# Patient Record
Sex: FEMALE | Race: WHITE | NOT HISPANIC OR LATINO | Employment: FULL TIME | URBAN - METROPOLITAN AREA
[De-identification: names, ages, dates, MRNs, and addresses within clinical notes are randomized per-mention and may not be internally consistent; named-entity substitution may affect disease eponyms.]

---

## 2017-01-19 ENCOUNTER — OFFICE VISIT (OUTPATIENT)
Dept: ORTHOPEDIC SURGERY | Facility: CLINIC | Age: 57
End: 2017-01-19

## 2017-01-19 VITALS — BODY MASS INDEX: 23.95 KG/M2 | HEIGHT: 68 IN | WEIGHT: 158 LBS

## 2017-01-19 DIAGNOSIS — M25.552 HIP PAIN, LEFT: Primary | ICD-10-CM

## 2017-01-19 PROCEDURE — 99213 OFFICE O/P EST LOW 20 MIN: CPT | Performed by: ORTHOPAEDIC SURGERY

## 2017-01-19 PROCEDURE — 73502 X-RAY EXAM HIP UNI 2-3 VIEWS: CPT | Performed by: ORTHOPAEDIC SURGERY

## 2017-01-19 RX ORDER — TOPIRAMATE 50 MG/1
50 TABLET, FILM COATED ORAL NIGHTLY
COMMUNITY
Start: 2016-02-15 | End: 2018-11-28

## 2017-01-19 NOTE — PROGRESS NOTES
Patient: Keila Caldwell  YOB: 1960 57 y.o. female  Medical Record Number: [id number]    Chief Complaints:   Chief Complaint   Patient presents with   • Left Hip - Establish Care   • Left Knee - Follow-up       History of Present Illness:Keila Caldwell is a 57 y.o. female who presents with  complaints of mild soreness on the posterior aspect of the left hip.  She initially had some knee complaints when she started seeing me that has resolved and now feels most of the discomfort is on the posterior aspect of the hip described as an occasional ache.  Worse when sitting.    Allergies:   Allergies   Allergen Reactions   • Naproxen Other (See Comments)     Kidney dysfunction       Medications:   Current Outpatient Prescriptions   Medication Sig Dispense Refill   • calcitriol (ROCALTROL) 0.5 MCG capsule Take 0.5 mcg by mouth.     • hydroxychloroquine (PLAQUENIL) 200 MG tablet TAKE 1 TABLET TWICE A DAY  3   • rizatriptan (MAXALT) 10 MG tablet TAKE 1 TABLET BY MOUTH ONCE AS NEEDED FOR MIGRAINE FOR UP TO 1 DOSE MAY REPEAT IN 2 HOURS IF NEEDED  5   • SYNTHROID 100 MCG tablet TAKE 1 TABLET BY MOUTH DAILY  3   • topiramate (TOPAMAX) 50 MG tablet TAKE 1 TABLET BY MOUTH NIGHTLY  5   • topiramate (TOPAMAX) 50 MG tablet Take 50 mg by mouth.     • vitamin D (ERGOCALCIFEROL) 76139 UNITS capsule capsule Take 50,000 Units by mouth.     • Diclofenac Sodium (PENNSAID) 1.5 % solution Place  on the skin.     • ESTROGEL 0.75 MG/1.25 GM (0.06%) topical gel Place 1.25 g on the skin daily.       No current facility-administered medications for this visit.          The following portions of the patient's history were reviewed and updated as appropriate: allergies, current medications, past family history, past medical history, past social history, past surgical history and problem list.    Review of Systems:   A 14 point review of systems was performed. All systems negative except pertinent positives/negative listed in HPI  "above    Physical Exam:   Vitals:    01/19/17 0818   Weight: 158 lb (71.7 kg)   Height: 68\" (172.7 cm)       General: A and O x 3, ASA, NAD    SCLERA:    Normal    DENTITION:   Normal  Hip:  left    LEG ALIGNMENT:     Neutral   ,    equal leg lengths    GAIT:     Nonantalgic    SKIN:     No abnormality    RANGE OF MOTION:      Full without joint irritability    STRENGTH:     5 / 5    hip flexion and abduction    DISTAL PULSES:    Paplable    DISTAL SENSATION :   Intact    LYMPHATICS:     No   lymphadenopathy    OTHER:          - Negative Stinchfeld test      - Negative log roll      - No Tenderness to palpation trochanteric bursa      - Neg FADIR      - Neg BEBETO      - No SI tenderness          Radiology:  Xrays 2views (AP bilateral hips, and lateral of the hip) ordered and reviewed for evaluation of hip pain  demonstrating  no abnormality. There are no previous films for comparision.    Assessment/Plan: Left posterior hip pain likely muscular in nature.  We'll send her to physical therapy.  I'll see her back on a when necessary basis.      Karl Schaefer MD  1/19/2017  "

## 2017-01-19 NOTE — MR AVS SNAPSHOT
Keila Caldwell   1/19/2017 8:15 AM   Office Visit    Dept Phone:  [phone]   Encounter #:  [id number]    Provider:  Karl Schaefer MD   Department:  [facility] MEDICAL [facility]                Your Full Care Plan              Your Updated Medication List          This list is accurate as of: 1/19/17  8:38 AM.  Always use your most recent med list.                calcitriol 0.5 MCG capsule   Commonly known as:  ROCALTROL       ESTROGEL 0.75 MG/1.25 GM (0.06%) topical gel   Generic drug:  Estradiol       hydroxychloroquine 200 MG tablet   Commonly known as:  PLAQUENIL       PENNSAID 1.5 % solution   Generic drug:  Diclofenac Sodium       rizatriptan 10 MG tablet   Commonly known as:  MAXALT       SYNTHROID 100 MCG tablet   Generic drug:  levothyroxine       * topiramate 50 MG tablet   Commonly known as:  TOPAMAX       * topiramate 50 MG tablet   Commonly known as:  TOPAMAX       vitamin D 81593 UNITS capsule capsule   Commonly known as:  ERGOCALCIFEROL       * Notice:  This list has 2 medication(s) that are the same as other medications prescribed for you. Read the directions carefully, and ask your doctor or other care provider to review them with you.            We Performed the Following     Ambulatory Referral to Physical Therapy Evaluate and treat     XR Hip With or Without Pelvis 2 - 3 View Left       You Were Diagnosed With        Codes Comments    Hip pain, left    -  Primary ICD-10-CM: M25.552  ICD-9-CM: 719.45       Instructions     None    Patient Instructions History      Upcoming Appointments     Visit Type Date Time Department    OFFICE VISIT 1/19/2017  8:15 AM [facility]      [employer] Signup     Our records indicate that you have an active [employer] account.    You can view your After Visit Summary by going to [employer] and logging in with your [employer] username and password.  If you don't have a [employer]  "username and password but a parent or guardian has access to your record, the parent or guardian should login with their own [employer] username and password and access your record to view the After Visit Summary.    If you have questions, you can email [email or web address] or call [phone] to talk to our [employer] staff.  Remember, [employer] is NOT to be used for urgent needs.  For medical emergencies, dial 911.               Other Info from Your Visit           Allergies     Naproxen Allergy Other (See Comments)    Kidney dysfunction      Reason for Visit     Left Hip - Establish Care     Left Knee - Follow-up           Vital Signs     Height Weight Body Mass Index Smoking Status          68\" (172.7 cm) 158 lb (71.7 kg) 24.02 kg/m2 Never Smoker        Problems and Diagnoses Noted     Left hip pain    -  Primary        "

## 2017-02-03 ENCOUNTER — TREATMENT (OUTPATIENT)
Dept: PHYSICAL THERAPY | Facility: CLINIC | Age: 57
End: 2017-02-03

## 2017-02-03 DIAGNOSIS — M25.552 LEFT HIP PAIN: Primary | ICD-10-CM

## 2017-02-03 PROCEDURE — 97161 PT EVAL LOW COMPLEX 20 MIN: CPT | Performed by: PHYSICAL THERAPIST

## 2017-02-03 PROCEDURE — 97110 THERAPEUTIC EXERCISES: CPT | Performed by: PHYSICAL THERAPIST

## 2017-02-03 NOTE — PATIENT INSTRUCTIONS
Access Code: [email or web address]   URL: [email or web address]   Date: 02/03/2017   Prepared by: Anisa Aj     Exercises   Supine Hamstring Stretch with Strap - 3 reps - 20 hold - 1x daily   Supine ITB Stretch with Strap - 3 reps - 20 hold - 1x daily

## 2017-02-03 NOTE — PROGRESS NOTES
"Physical Therapy Initial Evaluation and Plan of Care    Patient: Keila Caldwell   : 1960  Diagnosis/ICD-10 Code:  No primary diagnosis found.  Referring practitioner: Karl Schaefer MD  Date of Initial Visit: 2/3/2017  Today's Date: 2/3/2017    Subjective Evaluation    History of Present Illness  Onset date: 2016.  Mechanism of injury: In 2016, initially treated for left knee pain.  Had PT in December, where she found that \"it was really in the hip\".  Knee pain has mostly resolved, hip pain is improved but still not resolved.  Pt has started back to yoga 2x per week.    Quality of life: good    Pain  Current pain ratin  At best pain ratin  At worst pain ratin  Location: left lateral hip  Quality: dull ache  Relieving factors: heat and medications  Exacerbated by: sitting for long periods.  Progression: improved    Diagnostic Tests  X-ray: normal    Treatments  Previous treatment: chiropractic  Patient Goals  Patient goals for therapy: decreased pain and increased strength        Objective     Observations     Additional Observation Details  Pelvic alignment WNL    Palpation     Right   No palpable tenderness to the gluteus medius, iliopsoas, lumbar paraspinals, proximal semitendinosus and quadratus lumborum. Tenderness of the piriformis and TFL.     Additional Palpation Details  Decreased flexibility B hamstrings, piriformis, TFL/ITB    Lumbar Screen  Lumbar range of motion within normal limits.    Neurological Testing   Sensation     Hip   Left Hip   Intact: light touch    Right Hip   Intact: light touch    Active Range of Motion   Left Hip   Normal active range of motion    Right Hip   Normal active range of motion  Left Knee   Normal active range of motion    Right Knee   Normal active range of motion    Strength/Myotome Testing     Left Hip   Planes of Motion   Flexion: 5  Extension: 4  Abduction: 4  Adduction: 5  External rotation: 4  Internal rotation: 4    Right Hip   Planes of " Motion   Flexion: 5  Extension: 4  Abduction: 4  Adduction: 5  External rotation: 4  Internal rotation: 4    Left Knee   Flexion: 5  Extension: 5    Right Knee   Flexion: 5  Extension: 5    Tests     Left Hip   Positive Eugene.   Negative BEBETO, piriformis, SI compression and SI distraction.   Naveen: Negative.   90/90 SLR: Positive.     Right Hip   Positive Eugene and piriformis.   Negative BEBETO, SI compression and SI distraction.   Naveen: Negative.    90/90 SLR: Positive.     Ambulation     Observational Gait   Gait: antalgic   Decreased right stance time.   Right stance time comments: mildly      Assessment & Plan     Assessment  Impairments: activity intolerance, impaired physical strength, lacks appropriate home exercise program and pain with function  Prognosis: good    Plan  Therapy options: will be seen for skilled physical therapy services  Planned modality interventions: cryotherapy, ultrasound, traction, thermotherapy (hydrocollator packs) and TENS  Planned therapy interventions: abdominal trunk stabilization, body mechanics training, flexibility, home exercise program, stretching, strengthening, soft tissue mobilization, postural training, neuromuscular re-education and manual therapy  Frequency: 2x week  Duration in weeks: 12  Treatment plan discussed with: patient  Plan details: Short Term Goals: 2-4 weeks. Patient will:  1. Be independent with initial HEP  2. Be instructed in posture and body mechanics  3. Demonstrate TrA contraction during exercises in clinic without need for cueing.    Long Term Goals: 4-6 weeks. Patient will:  1. Demonstrate improved Bilateral lower extremity/core MMT of 5/5 to allow for return to recreational/daily activities without increased pain.  2. Demonstrate normal lower extremity flexibility to allow for work/ADL's/performance of household tasks without pain.  3. Have no soft tissue restriction in involved musculature.  4. Report pain of </= 0 with all daily activities.  5.  Perceived disability >/= 70/80 as measured on LEFS  6. Be independent with long term HEP        Manual Therapy:    0     mins  70233;  Therapeutic Exercise:    8     mins  45496;     Neuromuscular Olivia:    0    mins  41916;    Therapeutic Activity:     0     mins  47876;     Gait Trainin     mins  24795;     Ultrasound:     0     mins  61063;    Electrical Stimulation:    0     mins  32779 ( );    Timed Treatment:   8   mins   Total Treatment:     50   mins    PT SIGNATURE: Anisa Aj PT, DPT          Physical Therapist                               [place] License #[id number]    DATE TREATMENT INITIATED: 2/3/2017    Initial Certification Certification Period: 2017  I certify that the therapy services are furnished while this patient is under my care.  The services outlined above are required by this patient, and will be reviewed every 90 days.     PHYSICIAN: Karl Schaefer MD      DATE:     Please sign and return via fax to [phone].. Thank you, [facility].

## 2017-02-06 ENCOUNTER — TREATMENT (OUTPATIENT)
Dept: PHYSICAL THERAPY | Facility: CLINIC | Age: 57
End: 2017-02-06

## 2017-02-06 DIAGNOSIS — M25.552 LEFT HIP PAIN: Primary | ICD-10-CM

## 2017-02-06 PROCEDURE — 97110 THERAPEUTIC EXERCISES: CPT | Performed by: PHYSICAL THERAPIST

## 2017-02-06 PROCEDURE — 97014 ELECTRIC STIMULATION THERAPY: CPT | Performed by: PHYSICAL THERAPIST

## 2017-02-06 NOTE — PROGRESS NOTES
Physical Therapy Daily Progress Note    Subjective     Keila Caldwell reports: adherence with stretches at home.  Has to stand for a long time today to train, asked which kind of shoes would be best for this.      Objective   See Exercise, Manual, and Modality Logs for complete treatment.       Assessment/Plan  Tolerated new exercises well without increased pain.  Advised pt regarding supportive shoes, suggested [employer] brand.   Progress per Plan of Care           Manual Therapy:    0     mins  62232;  Therapeutic Exercise:    30     mins  89707;     Neuromuscular Olivia:    0    mins  34061;    Therapeutic Activity:     0     mins  87554;     Gait Trainin     mins  13324;     Ultrasound:     0     mins  38752;    Electrical Stimulation:    15     mins  49172 ( );    Timed Treatment:   30   mins   Total Treatment:     45   mins    Anisa Aj PT, DPT  Physical Therapist  [place] License #[id number]

## 2017-02-13 ENCOUNTER — TREATMENT (OUTPATIENT)
Dept: PHYSICAL THERAPY | Facility: CLINIC | Age: 57
End: 2017-02-13

## 2017-02-13 DIAGNOSIS — M25.552 LEFT HIP PAIN: Primary | ICD-10-CM

## 2017-02-13 PROCEDURE — 97014 ELECTRIC STIMULATION THERAPY: CPT | Performed by: PHYSICAL THERAPIST

## 2017-02-13 PROCEDURE — 97110 THERAPEUTIC EXERCISES: CPT | Performed by: PHYSICAL THERAPIST

## 2017-02-13 NOTE — PROGRESS NOTES
Physical Therapy Daily Progress Note    Subjective     Keila Caldwell reports: hip pain is improved.      Objective   See Exercise, Manual, and Modality Logs for complete treatment.       Assessment/Plan  Flexibility improving.    Progress per Plan of Care           Manual Therapy:    0     mins  30769;  Therapeutic Exercise:    30     mins  07433;     Neuromuscular Olivia:    0    mins  40775;    Therapeutic Activity:     0     mins  85361;     Gait Trainin     mins  74960;     Ultrasound:     0     mins  59175;    Electrical Stimulation:    15     mins  48052 ( );    Timed Treatment:   30   mins   Total Treatment:     45   mins    Anisa Aj PT, DPT  Physical Therapist  [place] License #[id number]

## 2017-02-16 ENCOUNTER — TREATMENT (OUTPATIENT)
Dept: PHYSICAL THERAPY | Facility: CLINIC | Age: 57
End: 2017-02-16

## 2017-02-16 DIAGNOSIS — M25.552 LEFT HIP PAIN: Primary | ICD-10-CM

## 2017-02-16 PROCEDURE — 97110 THERAPEUTIC EXERCISES: CPT | Performed by: PHYSICAL THERAPIST

## 2017-02-16 PROCEDURE — 97014 ELECTRIC STIMULATION THERAPY: CPT | Performed by: PHYSICAL THERAPIST

## 2017-02-16 PROCEDURE — 97140 MANUAL THERAPY 1/> REGIONS: CPT | Performed by: PHYSICAL THERAPIST

## 2017-02-16 NOTE — PROGRESS NOTES
Physical Therapy Daily Progress Note    Subjective     Keila Caldwell reports: she fell on Tuesday, backward on to her left hip on tile/concrete floor.  States she is sore.      Objective   See Exercise, Manual, and Modality Logs for complete treatment.   Left innominate upslip noted    Assessment/Plan  Pelvis is level after mobilization.  Encouraged pt to perform TrA exercises often to ensure pelvis alignment is maintained  Progress strengthening /stabilization /functional activity           Manual Therapy:    8     mins  09428;  Therapeutic Exercise:    30     mins  42391;     Neuromuscular Olivia:    0    mins  71303;    Therapeutic Activity:     0     mins  42194;     Gait Trainin     mins  14793;     Ultrasound:     0     mins  17121;    Electrical Stimulation:    15     mins  69035 ( );    Timed Treatment:   38   mins   Total Treatment:     43   mins    Anisa Aj PT, DPT  Physical Therapist  [place] License #[id number]

## 2017-02-20 ENCOUNTER — TREATMENT (OUTPATIENT)
Dept: PHYSICAL THERAPY | Facility: CLINIC | Age: 57
End: 2017-02-20

## 2017-02-20 DIAGNOSIS — M25.552 LEFT HIP PAIN: Primary | ICD-10-CM

## 2017-02-20 PROCEDURE — 97014 ELECTRIC STIMULATION THERAPY: CPT | Performed by: PHYSICAL THERAPIST

## 2017-02-20 PROCEDURE — 97110 THERAPEUTIC EXERCISES: CPT | Performed by: PHYSICAL THERAPIST

## 2017-02-20 NOTE — PROGRESS NOTES
Physical Therapy Daily Progress Note    Subjective     Keila Caldwell reports: hip is sore this morning, [place] [employer] [place], standing/walking on concrete floors for several hours.      Objective   See Exercise, Manual, and Modality Logs for complete treatment.       Assessment/Plan  Pelvis level after mobs.  Discussed avoiding leg crossing with patient, she verbalizes understanding.  Progress per Plan of Care           Manual Therapy:    0     mins  06487;  Therapeutic Exercise:    30     mins  64087;     Neuromuscular Olivia:    0    mins  92853;    Therapeutic Activity:     0     mins  29937;     Gait Trainin     mins  90546;     Ultrasound:     0     mins  59235;    Electrical Stimulation:    15     mins  43142 ( );    Timed Treatment:   30   mins   Total Treatment:     45   mins    Anisa Aj PT, DPT  Physical Therapist  [place] License #[id number]

## 2017-02-24 ENCOUNTER — TREATMENT (OUTPATIENT)
Dept: PHYSICAL THERAPY | Facility: CLINIC | Age: 57
End: 2017-02-24

## 2017-02-24 DIAGNOSIS — M25.552 LEFT HIP PAIN: Primary | ICD-10-CM

## 2017-02-24 PROCEDURE — 97014 ELECTRIC STIMULATION THERAPY: CPT | Performed by: PHYSICAL THERAPIST

## 2017-02-24 PROCEDURE — 97110 THERAPEUTIC EXERCISES: CPT | Performed by: PHYSICAL THERAPIST

## 2017-02-24 NOTE — PROGRESS NOTES
" Physical Therapy Daily Progress Note    Subjective     Keila Caldwell reports: hip is \"reaaly starting to feel better\"      Objective   See Exercise, Manual, and Modality Logs for complete treatment.       Assessment/Plan  Strength improving, progressing well overall toward goals.  Progress strengthening /stabilization /functional activity           Manual Therapy:    0     mins  86252;  Therapeutic Exercise:    40     mins  61243;     Neuromuscular Olivia:    0    mins  28511;    Therapeutic Activity:     0     mins  00464;     Gait Trainin     mins  40958;     Ultrasound:     0     mins  59299;    Electrical Stimulation:    15     mins  17565 ( );    Timed Treatment:   40   mins   Total Treatment:     55   mins    Anisa Aj PT, DPT  Physical Therapist  [place] License #[id number]      "

## 2017-05-16 ENCOUNTER — DOCUMENTATION (OUTPATIENT)
Dept: PHYSICAL THERAPY | Facility: CLINIC | Age: 57
End: 2017-05-16

## 2017-05-16 DIAGNOSIS — M25.552 LEFT HIP PAIN: Primary | ICD-10-CM

## 2017-07-17 ENCOUNTER — TRANSCRIBE ORDERS (OUTPATIENT)
Dept: PHYSICAL THERAPY | Facility: CLINIC | Age: 57
End: 2017-07-17

## 2017-07-17 ENCOUNTER — TREATMENT (OUTPATIENT)
Dept: PHYSICAL THERAPY | Facility: CLINIC | Age: 57
End: 2017-07-17

## 2017-07-17 DIAGNOSIS — M25.552 PAIN OF LEFT HIP JOINT: Primary | ICD-10-CM

## 2017-07-17 PROCEDURE — 97161 PT EVAL LOW COMPLEX 20 MIN: CPT | Performed by: PHYSICAL THERAPIST

## 2017-07-17 PROCEDURE — 97035 APP MDLTY 1+ULTRASOUND EA 15: CPT | Performed by: PHYSICAL THERAPIST

## 2017-07-17 NOTE — PATIENT INSTRUCTIONS
Access Code: [email or web address]   URL: [email or web address]   Date: 07/17/2017   Prepared by: Anisa Aj     Exercises   Supine Hamstring Stretch with Strap - 3 reps - 20 hold - 1x daily   Supine ITB Stretch with Strap - 3 reps - 20 hold - 1x daily   Hip Flexor Stretch at Edge of Bed - 3 reps - 20 hold - 1x daily     Pt was educated regarding relevant anatomy/physiology and plan of care.

## 2017-07-17 NOTE — PROGRESS NOTES
Physical Therapy Initial Evaluation and Plan of Care    Patient: Keila Caldwell   : 1960  Diagnosis/ICD-10 Code:  Pain of left hip joint [M25.552]  Referring practitioner: YARELIS Clarke  Date of Initial Visit: 2017  Today's Date: 2017    Subjective Evaluation    History of Present Illness  Onset date: Several weeks ago.  Mechanism of injury: Pt with exacerbation of left hip pain of several weeks duration.  Saw rheumatologist the last week in , who did cortisone shots in both lateral hips.  Right one helped immediately, left took about a week but is now greatly improved.  Pt was seen earlier this year for hip pain as well.  Pt reports difficulty with standing for long periods, sitting for long periods, and exercising because of pain. Pt denies numbness, tingling.  Does report a fall on her left hip at the end of February.    Quality of life: good    Pain  Current pain ratin  At best pain rating: 3  At worst pain ratin (prior to injection)  Location: left lateral hip  Quality: dull ache  Relieving factors: heat and ice (estim at home)    Diagnostic Tests  No diagnostic tests performed    Treatments  Previous treatment: injection treatment and physical therapy  Patient Goals  Patient goals for therapy: decreased pain and return to sport/leisure activities             Objective     Observations   Left Hip  Negative for abrasion, adhesive scar, atrophy, deformity, edema, muscle spasms and trophic changes.     Palpation   Left   No palpable tenderness to the gluteus paris, iliopsoas, piriformis, proximal biceps femoris, proximal semimembranosus, proximal semitendinosus and quadratus lumborum.   Tenderness of the gluteus medius and TFL.     Tenderness     Additional Tenderness Details  Standing: left iliac crest higher, left ASIS higher, apparent LLD with left leg shorter  Left PSIS with limited ROM with trunk flexion in standing    Lumbar Screen  Lumbar range of motion within normal  limits.    Neurological Testing   Sensation     Hip   Left Hip   Intact: light touch    Right Hip   Intact: light touch    Active Range of Motion   Left Hip   Normal active range of motion    Right Hip   Normal active range of motion    Passive Range of Motion   Left Hip   Normal passive range of motion    Right Hip   Normal passive range of motion    Strength/Myotome Testing     Left Hip   Planes of Motion   Flexion: 5  Extension: 3-  Abduction: 3-  External rotation: 3-  Internal rotation: 3-    Right Hip   Planes of Motion   Flexion: 5  Extension: 4+  Abduction: 4+  External rotation: 4+  Internal rotation: 4+    Left Knee   Flexion: 4+  Extension: 5    Right Knee   Flexion: 5  Extension: 5    Left Ankle/Foot   Dorsiflexion: 5  Plantar flexion: 5    Right Ankle/Foot   Dorsiflexion: 5  Plantar flexion: 5    Tests     Left Hip   Positive BEBETO and Eugene.   Negative piriformis and scour.   Naveen: Positive.   90/90 SLR: Positive.     Right Hip   Naveen: Negative.    90/90 SLR: Positive.     Ambulation     Observational Gait   Walking speed, stride length, left stance time, right stance time, left swing time, right swing time, left step length and right step length within functional limits.   Left foot contact pattern: heel to toe  Right foot contact pattern: heel to toe  Left arm swing: within functional limits  Right arm swing: within functional limits  Base of support: normal    Additional Observational Gait Details  Excessive toe extension in swing phase bilaterally          Assessment & Plan     Assessment  Impairments: activity intolerance, impaired physical strength, lacks appropriate home exercise program and pain with function  Prognosis details: Short Term Goals: 2-4 weeks. Patient will:  1. Be independent with initial HEP  2. Be instructed in posture and body mechanics  3. Demonstrate TrA contraction during exercises in clinic without need for cueing.    Long Term Goals: 4-6 weeks. Patient will:  1.  Demonstrate improved Bilateral lower extremity/core MMT of >/= 4+/5 to allow for return to recreational/daily activities without increased pain.  2. Demonstrate normal lower extremity flexibility to allow for walking/ADL's/performance of household tasks without pain.  3. Demonstrate normal pelvic alignment  4. Have no soft tissue restriction in involved musculature.  5. Report ability to return to desired exercise program with </= 1/10 pain.  6. Perceived disability improved by 10% as measured on LEFS  7. Be independent with long term HEP    Plan  Therapy options: will be seen for skilled physical therapy services  Planned modality interventions: electrical stimulation/Russian stimulation, TENS, thermotherapy (hydrocollator packs), ultrasound and cryotherapy  Frequency: 2x week  Duration in weeks: 12  Treatment plan discussed with: patient  Functional Limitations: walking and uncomfortable because of pain      Manual Therapy:    0     mins  87513;  Therapeutic Exercise:    5     mins  71272;     Neuromuscular Olivia:    0    mins  89227;    Therapeutic Activity:     0     mins  73031;     Gait Trainin     mins  74181;     Ultrasound:     8     mins  12656;    Electrical Stimulation:    15     mins  80148 ( );    Timed Treatment:   8   mins   Total Treatment:     50   mins    PT SIGNATURE: Anisa Aj PT, DPT          Physical Therapist                               [place] License #[id number]    DATE TREATMENT INITIATED: 2017    Initial Certification Certification Period: 10/15/2017  I certify that the therapy services are furnished while this patient is under my care.  The services outlined above are required by this patient, and will be reviewed every 90 days.     PHYSICIAN:       DATE:     Please sign and return via fax to [phone].. Thank you, [facility].

## 2017-07-25 ENCOUNTER — TREATMENT (OUTPATIENT)
Dept: PHYSICAL THERAPY | Facility: CLINIC | Age: 57
End: 2017-07-25

## 2017-07-25 DIAGNOSIS — M25.552 PAIN OF LEFT HIP JOINT: Primary | ICD-10-CM

## 2017-07-25 PROCEDURE — 97035 APP MDLTY 1+ULTRASOUND EA 15: CPT | Performed by: PHYSICAL THERAPIST

## 2017-07-25 PROCEDURE — 97110 THERAPEUTIC EXERCISES: CPT | Performed by: PHYSICAL THERAPIST

## 2017-07-25 PROCEDURE — 97140 MANUAL THERAPY 1/> REGIONS: CPT | Performed by: PHYSICAL THERAPIST

## 2017-07-25 NOTE — PROGRESS NOTES
Physical Therapy Daily Progress Note    Visit #2    Subjective     Keila Caldwell reports: hip is improving, is adherent with stretches for HEP.      Objective   See Exercise, Manual, and Modality Logs for complete treatment.       Assessment/Plan  Tolerated well, pelvis is level after mobs  Progress per Plan of Care           Manual Therapy:    15     mins  57068;  Therapeutic Exercise:    15     mins  33444;     Neuromuscular Olivia:    0    mins  58800;    Therapeutic Activity:     0     mins  16145;     Gait Trainin     mins  25560;     Ultrasound:     8     mins  87284;    Electrical Stimulation:    0     mins  09657 ( );    Timed Treatment:   38   mins   Total Treatment:     38   mins    Anisa Aj PT, DPT  Physical Therapist  [place] License #[id number]

## 2017-08-01 ENCOUNTER — TREATMENT (OUTPATIENT)
Dept: PHYSICAL THERAPY | Facility: CLINIC | Age: 57
End: 2017-08-01

## 2017-08-01 DIAGNOSIS — M25.552 PAIN OF LEFT HIP JOINT: Primary | ICD-10-CM

## 2017-08-01 PROCEDURE — 97035 APP MDLTY 1+ULTRASOUND EA 15: CPT | Performed by: PHYSICAL THERAPIST

## 2017-08-01 PROCEDURE — 97110 THERAPEUTIC EXERCISES: CPT | Performed by: PHYSICAL THERAPIST

## 2017-08-01 NOTE — PROGRESS NOTES
Physical Therapy Daily Progress Note    Visit #3    Subjective     Keila Caldwell reports: hip is slowly improving      Objective   See Exercise, Manual, and Modality Logs for complete treatment.       Assessment/Plan  Pelvic alignment is WNL today.  Does well with stretches, will benefit from initiating strengthening exercises next visit  Progress per Plan of Care           Manual Therapy:    0     mins  44396;  Therapeutic Exercise:    20     mins  05943;     Neuromuscular Olivia:    0    mins  92929;    Therapeutic Activity:     0     mins  91544;     Gait Trainin     mins  88040;     Ultrasound:     8     mins  63933;    Electrical Stimulation:    0     mins  27159 ( );    Timed Treatment:   28   mins   Total Treatment:     33   mins    Anisa Aj PT, DPT  Physical Therapist  [place] License #[id number]

## 2017-11-14 ENCOUNTER — APPOINTMENT (OUTPATIENT)
Dept: WOMENS IMAGING | Facility: HOSPITAL | Age: 57
End: 2017-11-14

## 2017-11-14 PROCEDURE — 77067 SCR MAMMO BI INCL CAD: CPT | Performed by: RADIOLOGY

## 2017-11-14 PROCEDURE — 77063 BREAST TOMOSYNTHESIS BI: CPT | Performed by: RADIOLOGY

## 2017-11-14 PROCEDURE — G0202 SCR MAMMO BI INCL CAD: HCPCS | Performed by: RADIOLOGY

## 2017-11-21 ENCOUNTER — TRANSCRIBE ORDERS (OUTPATIENT)
Dept: ADMINISTRATIVE | Facility: HOSPITAL | Age: 57
End: 2017-11-21

## 2017-11-21 ENCOUNTER — HOSPITAL ENCOUNTER (OUTPATIENT)
Dept: GENERAL RADIOLOGY | Facility: HOSPITAL | Age: 57
Discharge: HOME | End: 2017-11-21
Admitting: NURSE PRACTITIONER

## 2017-11-21 DIAGNOSIS — M19.90 OSTEOARTHRITIS, UNSPECIFIED OSTEOARTHRITIS TYPE, UNSPECIFIED SITE: ICD-10-CM

## 2017-11-21 DIAGNOSIS — M70.61 TROCHANTERIC BURSITIS OF RIGHT HIP: ICD-10-CM

## 2017-11-21 DIAGNOSIS — M70.62 TROCHANTERIC BURSITIS OF LEFT HIP: ICD-10-CM

## 2017-11-21 DIAGNOSIS — M06.09 RHEUMATOID ARTHRITIS OF MULTIPLE SITES WITHOUT RHEUMATOID FACTOR (HCC): Primary | ICD-10-CM

## 2017-11-21 DIAGNOSIS — M06.09 RHEUMATOID ARTHRITIS OF MULTIPLE SITES WITHOUT RHEUMATOID FACTOR (HCC): ICD-10-CM

## 2017-11-21 PROCEDURE — 73521 X-RAY EXAM HIPS BI 2 VIEWS: CPT

## 2017-12-21 ENCOUNTER — TRANSCRIBE ORDERS (OUTPATIENT)
Dept: ADMINISTRATIVE | Facility: HOSPITAL | Age: 57
End: 2017-12-21

## 2017-12-21 DIAGNOSIS — M25.552 LEFT HIP PAIN: Primary | ICD-10-CM

## 2017-12-22 ENCOUNTER — OFFICE VISIT (OUTPATIENT)
Dept: FAMILY MEDICINE CLINIC | Facility: CLINIC | Age: 57
End: 2017-12-22

## 2017-12-22 VITALS
HEIGHT: 68 IN | OXYGEN SATURATION: 98 % | TEMPERATURE: 98 F | RESPIRATION RATE: 16 BRPM | DIASTOLIC BLOOD PRESSURE: 72 MMHG | HEART RATE: 82 BPM | BODY MASS INDEX: 23.43 KG/M2 | SYSTOLIC BLOOD PRESSURE: 102 MMHG | WEIGHT: 154.6 LBS

## 2017-12-22 DIAGNOSIS — J06.9 ACUTE URI: Primary | ICD-10-CM

## 2017-12-22 PROCEDURE — 99213 OFFICE O/P EST LOW 20 MIN: CPT | Performed by: NURSE PRACTITIONER

## 2017-12-22 RX ORDER — CALCITRIOL 0.5 UG/1
CAPSULE, LIQUID FILLED ORAL
Refills: 3 | COMMUNITY
Start: 2017-11-28 | End: 2018-03-01

## 2017-12-22 NOTE — PROGRESS NOTES
Subjective   Keila Caldwell is a 57 y.o. female.   Chief Complaint   Patient presents with   • Sore Throat     pt states has been going on for 3 days 12/19/17   • Cough   • Nasal Congestion     Vitals:    12/22/17 1121   BP: 102/72   Pulse: 82   Resp: 16   Temp: 98 °F (36.7 °C)   SpO2: 98%     No LMP recorded. Patient has had a hysterectomy.    History of Present Illness  Keila is here for an acute visit. She c/o cough, nasal congestion,sore throat for 3 days. She denies fever, chills or body aches   She has taken nyquil otc with little relief.     The following portions of the patient's history were reviewed and updated as appropriate: allergies, current medications, past family history, past medical history, past social history, past surgical history and problem list.    Review of Systems   HENT: Positive for congestion, sore throat and trouble swallowing. Negative for drooling, ear discharge and ear pain.    Respiratory: Negative for cough, shortness of breath and stridor.    Gastrointestinal: Positive for diarrhea. Negative for abdominal pain and vomiting.   Musculoskeletal: Negative for neck pain.   Neurological: Positive for headaches.       Objective   Physical Exam   Constitutional: Vital signs are normal. She appears well-developed and well-nourished. No distress.   HENT:   Right Ear: Tympanic membrane and ear canal normal.   Left Ear: Tympanic membrane and ear canal normal.   Nose: Mucosal edema and rhinorrhea present.   Mouth/Throat: Posterior oropharyngeal erythema present.   Cardiovascular: Normal rate and regular rhythm.    Pulmonary/Chest: Effort normal and breath sounds normal.   Lymphadenopathy:     She has cervical adenopathy.        Right cervical: Superficial cervical adenopathy present.        Left cervical: Superficial cervical adenopathy present.   Neurological: She is alert.   Skin: Skin is warm and dry.       Assessment/Plan   Keila was seen today for sore throat, cough and nasal  congestion.    Diagnoses and all orders for this visit:    Acute URI    Symptom treatment for 7-10 days  Rest and fluids  Tylenol or motrin   Avoid second hand smoke and allergens   Offered cough suppressant but declined   Throat lozenges, humidifier, vicks vapor rub as needed  Follow up if your symptoms persist past 7-10 days or sooner if your symptoms worsen or if you develop new symptoms

## 2017-12-29 ENCOUNTER — HOSPITAL ENCOUNTER (OUTPATIENT)
Dept: GENERAL RADIOLOGY | Facility: HOSPITAL | Age: 57
Discharge: HOME | End: 2017-12-29
Attending: ORTHOPAEDIC SURGERY | Admitting: ORTHOPAEDIC SURGERY

## 2017-12-29 DIAGNOSIS — M25.552 LEFT HIP PAIN: ICD-10-CM

## 2017-12-29 PROCEDURE — 25010000002 METHYLPREDNISOLONE PER 80 MG: Performed by: RADIOLOGY

## 2017-12-29 PROCEDURE — 0 IOPAMIDOL 61 % SOLUTION: Performed by: RADIOLOGY

## 2017-12-29 PROCEDURE — 77002 NEEDLE LOCALIZATION BY XRAY: CPT

## 2017-12-29 RX ORDER — LIDOCAINE HYDROCHLORIDE 10 MG/ML
10 INJECTION, SOLUTION INFILTRATION; PERINEURAL ONCE
Status: COMPLETED | OUTPATIENT
Start: 2017-12-29 | End: 2017-12-29

## 2017-12-29 RX ORDER — METHYLPREDNISOLONE ACETATE 80 MG/ML
80 INJECTION, SUSPENSION INTRA-ARTICULAR; INTRALESIONAL; INTRAMUSCULAR; SOFT TISSUE ONCE
Status: COMPLETED | OUTPATIENT
Start: 2017-12-29 | End: 2017-12-29

## 2017-12-29 RX ORDER — BUPIVACAINE HYDROCHLORIDE 2.5 MG/ML
5 INJECTION, SOLUTION EPIDURAL; INFILTRATION; INTRACAUDAL ONCE
Status: COMPLETED | OUTPATIENT
Start: 2017-12-29 | End: 2017-12-29

## 2017-12-29 RX ADMIN — IOPAMIDOL 2 ML: 612 INJECTION, SOLUTION INTRAVENOUS at 12:15

## 2017-12-29 RX ADMIN — BUPIVACAINE HYDROCHLORIDE 5 ML: 2.5 INJECTION, SOLUTION EPIDURAL; INFILTRATION; INTRACAUDAL; PERINEURAL at 12:15

## 2017-12-29 RX ADMIN — METHYLPREDNISOLONE ACETATE 80 MG: 80 INJECTION, SUSPENSION INTRA-ARTICULAR; INTRALESIONAL; INTRAMUSCULAR; SOFT TISSUE at 12:15

## 2017-12-29 RX ADMIN — LIDOCAINE HYDROCHLORIDE 4 ML: 10 INJECTION, SOLUTION INFILTRATION; PERINEURAL at 12:14

## 2018-01-28 ENCOUNTER — PREP FOR SURGERY (OUTPATIENT)
Dept: OTHER | Facility: HOSPITAL | Age: 58
End: 2018-01-28

## 2018-01-28 PROBLEM — M16.12 PRIMARY OSTEOARTHRITIS OF LEFT HIP: Chronic | Status: ACTIVE | Noted: 2018-01-28

## 2018-02-12 ENCOUNTER — OFFICE VISIT (OUTPATIENT)
Dept: FAMILY MEDICINE CLINIC | Facility: CLINIC | Age: 58
End: 2018-02-12

## 2018-02-12 VITALS
HEART RATE: 76 BPM | DIASTOLIC BLOOD PRESSURE: 68 MMHG | WEIGHT: 156 LBS | RESPIRATION RATE: 16 BRPM | SYSTOLIC BLOOD PRESSURE: 120 MMHG | HEIGHT: 68 IN | BODY MASS INDEX: 23.64 KG/M2 | OXYGEN SATURATION: 98 %

## 2018-02-12 DIAGNOSIS — E89.0 POSTOPERATIVE HYPOTHYROIDISM: ICD-10-CM

## 2018-02-12 DIAGNOSIS — Z01.818 PREOPERATIVE CLEARANCE: ICD-10-CM

## 2018-02-12 DIAGNOSIS — M16.12 PRIMARY OSTEOARTHRITIS OF LEFT HIP: Primary | Chronic | ICD-10-CM

## 2018-02-12 PROCEDURE — 99396 PREV VISIT EST AGE 40-64: CPT | Performed by: INTERNAL MEDICINE

## 2018-02-12 NOTE — PROGRESS NOTES
Subjective   Keila Caldwell is a 58 y.o. female. Patient is here today for   Chief Complaint   Patient presents with   • Surgical Clearance     on 3/16/18 with Dr. Balderas for Left Total Anterior Hip           Vitals:    02/12/18 1347   BP: 120/68   Pulse: 76   Resp: 16   SpO2: 98%     The following portions of the patient's history were reviewed and updated as appropriate: allergies, current medications, past family history, past medical history, past social history, past surgical history and problem list.    Past Medical History:   Diagnosis Date   • Arthritis    • Hypothyroidism    • Left hip pain    • Migraines     ON MED   • UTI (urinary tract infection)     CURRENTLY FINISHING AB FOR A UTI FOUND COUPLE WEEKS AGO      Allergies   Allergen Reactions   • Naproxen Other (See Comments)     Kidney dysfunction   • Nsaids Other (See Comments)     KIDNEY DYSFUNCTION      Social History     Social History   • Marital status:      Spouse name: N/A   • Number of children: N/A   • Years of education: N/A     Occupational History   • Not on file.     Social History Main Topics   • Smoking status: Never Smoker   • Smokeless tobacco: Never Used   • Alcohol use Yes      Comment: social   • Drug use: No   • Sexual activity: Not on file     Other Topics Concern   • Not on file     Social History Narrative        Current Outpatient Prescriptions:   •  CALCIUM PO, Take 3,000 mg by mouth Every Morning. CHEWABLE TABLETS, Disp: , Rfl:   •  hydroxychloroquine (PLAQUENIL) 200 MG tablet, Take 200 mg by mouth 2 (Two) Times a Day. TAKE 1 TABLET TWICE A DAY, Disp: , Rfl: 3  •  rizatriptan (MAXALT) 10 MG tablet, Take 10 mg by mouth As Needed. TAKE 1 TABLET BY MOUTH ONCE AS NEEDED FOR MIGRAINE FOR UP TO 1 DOSE MAY REPEAT IN 2 HOURS IF NEEDED, Disp: , Rfl: 5  •  topiramate (TOPAMAX) 50 MG tablet, Take 50 mg by mouth Every Night., Disp: , Rfl:   •  acetaminophen (TYLENOL) 500 MG tablet, Take 500 mg by mouth Every 6 (Six) Hours As  Needed for Mild Pain ., Disp: , Rfl:   •  calcitriol (ROCALTROL) 0.5 MCG capsule, Take 0.5 mcg by mouth Every Morning., Disp: , Rfl:   •  cephalexin (KEFLEX) 500 MG capsule, Take 1 capsule by mouth 3 (Three) Times a Day. (Patient taking differently: Take 500 mg by mouth. FOR 5 DAYS ONCE A DAY FOR UTI    LAST TAB 3-1-18), Disp: 15 capsule, Rfl: 0  •  Chlorhexidine Gluconate 2 % pads, Apply  topically. AS DIRECTED, Disp: , Rfl:   •  levothyroxine (SYNTHROID, LEVOTHROID) 100 MCG tablet, Take 100 mcg by mouth Every Morning., Disp: , Rfl:   •  mupirocin (BACTROBAN) 2 % ointment, Apply  topically. AS DIRECTED, Disp: , Rfl:      Objective     History of Present Illness Keila is here for a preoperative clearance.  She is scheduled for a total hip replacement in March.  She has  hypothyroidism and is followed by endocrinology.  She has had a thyroidectomy.  She also has osteoarthritis and has been on Plaquenil prescribed by her rheumatologist.  She states that she cannot take nonsteroidal anti-inflammatory agents due to elevated kidney functions.  She also has a history of migraine headaches and is on Topamax.  She feels well.  She eats healthy but is unable to exercise due to hip pain.  She really has no cardiovascular risk factors.  She has normal blood pressure and has had normal cholesterol when checked in the past.  She has never smoked.    Review of Systems   Constitutional: Negative for activity change and unexpected weight change.   Respiratory: Negative.    Cardiovascular: Negative.    Gastrointestinal: Negative.    Genitourinary: Negative.    Neurological: Positive for headaches.   Psychiatric/Behavioral: Negative.        Physical Exam   Constitutional: She appears well-developed and well-nourished.   Neck: Neck supple.   Cardiovascular: Normal rate, regular rhythm and normal heart sounds.    Pulmonary/Chest: Effort normal and breath sounds normal.   Musculoskeletal: She exhibits no edema.   Lymphadenopathy:      She has no cervical adenopathy.   Neurological: She is alert.   Psychiatric: She has a normal mood and affect.   Vitals reviewed.      ASSESSMENT     Problem List Items Addressed This Visit        Endocrine    Hypothyroidism       Musculoskeletal and Integument    Primary osteoarthritis of left hip - Primary (Chronic)       Other    Preoperative clearance          PLAN  Patient Instructions   Blood pressure is normal.  Preop labs including a compress metabolic panel, thyroid-stimulating hormone level, hemoglobin A1c were normal.  Total cholesterol is 195.  HDL is 63 and LDL is slightly elevated at 110.  Urinalysis showed 1+ leukocytes but microscopic showed 3-5 white cells and culture was not indicated.  EKG is normal.  There are no contraindications to hip surgery.    No Follow-up on file.

## 2018-02-13 DIAGNOSIS — E89.0 POSTOPERATIVE HYPOTHYROIDISM: ICD-10-CM

## 2018-02-13 NOTE — PATIENT INSTRUCTIONS
Blood pressure is normal.  Preop labs including a compress metabolic panel, thyroid-stimulating hormone level, hemoglobin A1c were normal.  Total cholesterol is 195.  HDL is 63 and LDL is slightly elevated at 110.  Urinalysis showed 1+ leukocytes but microscopic showed 3-5 white cells and culture was not indicated.  EKG is normal.  There are no contraindications to hip surgery.

## 2018-02-18 ENCOUNTER — APPOINTMENT (OUTPATIENT)
Dept: CT IMAGING | Facility: HOSPITAL | Age: 58
End: 2018-02-18

## 2018-02-18 ENCOUNTER — HOSPITAL ENCOUNTER (EMERGENCY)
Facility: HOSPITAL | Age: 58
Discharge: HOME | End: 2018-02-18
Attending: FAMILY MEDICINE | Admitting: FAMILY MEDICINE

## 2018-02-18 VITALS
OXYGEN SATURATION: 100 % | DIASTOLIC BLOOD PRESSURE: 77 MMHG | RESPIRATION RATE: 16 BRPM | TEMPERATURE: 98 F | HEART RATE: 78 BPM | BODY MASS INDEX: 23.49 KG/M2 | SYSTOLIC BLOOD PRESSURE: 110 MMHG | HEIGHT: 68 IN | WEIGHT: 155 LBS

## 2018-02-18 DIAGNOSIS — N30.91 HEMORRHAGIC CYSTITIS: Primary | ICD-10-CM

## 2018-02-18 LAB
ALBUMIN SERPL-MCNC: 4.5 G/DL (ref 3.5–5.2)
ALBUMIN/GLOB SERPL: 1.9 G/DL
ALP SERPL-CCNC: 60 U/L (ref 39–117)
ALT SERPL W P-5'-P-CCNC: 14 U/L (ref 1–33)
ANION GAP SERPL CALCULATED.3IONS-SCNC: 12.7 MMOL/L
AST SERPL-CCNC: 22 U/L (ref 1–32)
BACTERIA UR QL AUTO: ABNORMAL /HPF
BASOPHILS # BLD AUTO: 0.04 10*3/MM3 (ref 0–0.2)
BASOPHILS NFR BLD AUTO: 0.9 % (ref 0–1.5)
BILIRUB SERPL-MCNC: 0.4 MG/DL (ref 0.1–1.2)
BILIRUB UR QL STRIP: NEGATIVE
BUN BLD-MCNC: 21 MG/DL (ref 6–20)
BUN/CREAT SERPL: 21.9 (ref 7–25)
CALCIUM SPEC-SCNC: 8.6 MG/DL (ref 8.6–10.5)
CHLORIDE SERPL-SCNC: 103 MMOL/L (ref 98–107)
CLARITY UR: ABNORMAL
CO2 SERPL-SCNC: 26.3 MMOL/L (ref 22–29)
COLOR UR: ABNORMAL
CREAT BLD-MCNC: 0.96 MG/DL (ref 0.57–1)
DEPRECATED RDW RBC AUTO: 45.7 FL (ref 37–54)
EOSINOPHIL # BLD AUTO: 0.16 10*3/MM3 (ref 0–0.7)
EOSINOPHIL NFR BLD AUTO: 3.4 % (ref 0.3–6.2)
ERYTHROCYTE [DISTWIDTH] IN BLOOD BY AUTOMATED COUNT: 13.4 % (ref 11.7–13)
GFR SERPL CREATININE-BSD FRML MDRD: 60 ML/MIN/1.73
GFR SERPL CREATININE-BSD FRML MDRD: 73 ML/MIN/1.73
GLOBULIN UR ELPH-MCNC: 2.4 GM/DL
GLUCOSE BLD-MCNC: 89 MG/DL (ref 65–99)
GLUCOSE UR STRIP-MCNC: NEGATIVE MG/DL
HCT VFR BLD AUTO: 42.2 % (ref 35.6–45.5)
HGB BLD-MCNC: 13.6 G/DL (ref 11.9–15.5)
HGB UR QL STRIP.AUTO: ABNORMAL
HYALINE CASTS UR QL AUTO: ABNORMAL /LPF
IMM GRANULOCYTES # BLD: 0 10*3/MM3 (ref 0–0.03)
IMM GRANULOCYTES NFR BLD: 0 % (ref 0–0.5)
KETONES UR QL STRIP: NEGATIVE
LEUKOCYTE ESTERASE UR QL STRIP.AUTO: ABNORMAL
LIPASE SERPL-CCNC: 45 U/L (ref 13–60)
LYMPHOCYTES # BLD AUTO: 1.54 10*3/MM3 (ref 0.9–4.8)
LYMPHOCYTES NFR BLD AUTO: 33.2 % (ref 19.6–45.3)
MCH RBC QN AUTO: 30.2 PG (ref 26.9–32)
MCHC RBC AUTO-ENTMCNC: 32.2 G/DL (ref 32.4–36.3)
MCV RBC AUTO: 93.6 FL (ref 80.5–98.2)
MONOCYTES # BLD AUTO: 0.54 10*3/MM3 (ref 0.2–1.2)
MONOCYTES NFR BLD AUTO: 11.6 % (ref 5–12)
NEUTROPHILS # BLD AUTO: 2.36 10*3/MM3 (ref 1.9–8.1)
NEUTROPHILS NFR BLD AUTO: 50.9 % (ref 42.7–76)
NITRITE UR QL STRIP: NEGATIVE
PH UR STRIP.AUTO: 6.5 [PH] (ref 5–8)
PLATELET # BLD AUTO: 230 10*3/MM3 (ref 140–500)
PMV BLD AUTO: 10.3 FL (ref 6–12)
POTASSIUM BLD-SCNC: 3.8 MMOL/L (ref 3.5–5.2)
PROT SERPL-MCNC: 6.9 G/DL (ref 6–8.5)
PROT UR QL STRIP: ABNORMAL
RBC # BLD AUTO: 4.51 10*6/MM3 (ref 3.9–5.2)
RBC # UR: ABNORMAL /HPF
REF LAB TEST METHOD: ABNORMAL
SODIUM BLD-SCNC: 142 MMOL/L (ref 136–145)
SP GR UR STRIP: 1.01 (ref 1–1.03)
SQUAMOUS #/AREA URNS HPF: ABNORMAL /HPF
UROBILINOGEN UR QL STRIP: ABNORMAL
WBC NRBC COR # BLD: 4.64 10*3/MM3 (ref 4.5–10.7)
WBC UR QL AUTO: ABNORMAL /HPF

## 2018-02-18 PROCEDURE — 80053 COMPREHEN METABOLIC PANEL: CPT | Performed by: PHYSICIAN ASSISTANT

## 2018-02-18 PROCEDURE — 0 IOPAMIDOL 61 % SOLUTION: Performed by: FAMILY MEDICINE

## 2018-02-18 PROCEDURE — 99284 EMERGENCY DEPT VISIT MOD MDM: CPT

## 2018-02-18 PROCEDURE — 81001 URINALYSIS AUTO W/SCOPE: CPT | Performed by: PHYSICIAN ASSISTANT

## 2018-02-18 PROCEDURE — 83690 ASSAY OF LIPASE: CPT | Performed by: PHYSICIAN ASSISTANT

## 2018-02-18 PROCEDURE — 74177 CT ABD & PELVIS W/CONTRAST: CPT

## 2018-02-18 PROCEDURE — 85025 COMPLETE CBC W/AUTO DIFF WBC: CPT | Performed by: PHYSICIAN ASSISTANT

## 2018-02-18 RX ORDER — SODIUM CHLORIDE 0.9 % (FLUSH) 0.9 %
10 SYRINGE (ML) INJECTION AS NEEDED
Status: DISCONTINUED | OUTPATIENT
Start: 2018-02-18 | End: 2018-02-18 | Stop reason: HOSPADM

## 2018-02-18 RX ORDER — CEPHALEXIN 500 MG/1
500 CAPSULE ORAL 3 TIMES DAILY
Qty: 15 CAPSULE | Refills: 0 | Status: SHIPPED | OUTPATIENT
Start: 2018-02-18 | End: 2018-11-28

## 2018-02-18 RX ADMIN — IOPAMIDOL 85 ML: 612 INJECTION, SOLUTION INTRAVENOUS at 13:16

## 2018-02-18 NOTE — ED PROVIDER NOTES
" EMERGENCY DEPARTMENT ENCOUNTER    CHIEF COMPLAINT  Chief Complaint: L flank pain  History given by: patient  History limited by: nothing  Room Number: 18/18  PMD: Nito Cervantes MD      HPI:  Pt is a 58 y.o. female who presents complaining of L flank pain that radiates down her L leg and began a couple months ago. Pt also c/o hematuria, frequent urination, and cramping that started yesterday but is resolved at this time. Pt denies vaginal discharge, fever, or weight loss. Pt has hx of adverse effect to anti-inflammatory medication.    Duration:  Couple months  Onset: gradual  Timing: constant  Location: L flank  Radiation: down her L leg  Quality: \"pain\"  Intensity/Severity: moderate  Progression: unchanged  Associated Symptoms: hematuria, frequent urination, cramping  Aggravating Factors: none stated  Alleviating Factors: none stated  Previous Episodes: Pt has hx of adverse effect to anti-inflammatory medication.  Treatment before arrival: Pt does not report having any treatment PTA.    PAST MEDICAL HISTORY  Active Ambulatory Problems     Diagnosis Date Noted   • Hypothyroidism 03/15/2016   • Chronic nausea 03/15/2016   • LBP (low back pain) 03/15/2016   • History of thyroidectomy 03/31/2015   • Hypocalcemia 03/31/2015   • Hypoparathyroidism 03/31/2015   • Intractable chronic migraine without aura 08/06/2015   • Postmenopausal status 03/31/2015   • Primary osteoarthritis of left hip 01/28/2018   • Preoperative clearance 02/12/2018     Resolved Ambulatory Problems     Diagnosis Date Noted   • Fatigue 03/15/2016   • Belching 03/15/2016     Past Medical History:   Diagnosis Date   • Hypothyroidism        PAST SURGICAL HISTORY  Past Surgical History:   Procedure Laterality Date   • COLONOSCOPY     • FOOT SURGERY     • HYSTERECTOMY     • TONSILLECTOMY         FAMILY HISTORY  History reviewed. No pertinent family history.    SOCIAL HISTORY  Social History     Social History   • Marital status:      Spouse " name: N/A   • Number of children: N/A   • Years of education: N/A     Occupational History   • Not on file.     Social History Main Topics   • Smoking status: Never Smoker   • Smokeless tobacco: Never Used   • Alcohol use Yes      Comment: social   • Drug use: No   • Sexual activity: Not on file     Other Topics Concern   • Not on file     Social History Narrative       ALLERGIES  Naproxen    REVIEW OF SYSTEMS  Review of Systems   Constitutional: Negative for fever and unexpected weight change.   HENT: Negative for sore throat.    Eyes: Negative.    Respiratory: Negative for cough and shortness of breath.    Cardiovascular: Negative for chest pain.   Gastrointestinal: Positive for abdominal pain (cramping). Negative for diarrhea and vomiting.   Genitourinary: Positive for flank pain (L flank, radiates down L leg), frequency and hematuria. Negative for dysuria and vaginal discharge.   Musculoskeletal: Negative for neck pain.   Skin: Negative for rash.   Allergic/Immunologic: Negative.    Neurological: Negative for weakness, numbness and headaches.   Hematological: Negative.    Psychiatric/Behavioral: Negative.    All other systems reviewed and are negative.      PHYSICAL EXAM  ED Triage Vitals   Temp Heart Rate Resp BP SpO2   -- 02/18/18 1101 02/18/18 1101 -- 02/18/18 1101    103 16  98 %      Temp src Heart Rate Source Patient Position BP Location FiO2 (%)   -- -- -- -- --              Physical Exam   Constitutional: She is oriented to person, place, and time and well-developed, well-nourished, and in no distress. No distress.   HENT:   Head: Normocephalic and atraumatic.   Eyes: EOM are normal. Pupils are equal, round, and reactive to light.   Neck: Normal range of motion. Neck supple.   Cardiovascular: Normal rate, regular rhythm and normal heart sounds.    Pulmonary/Chest: Effort normal and breath sounds normal. No respiratory distress.   Abdominal: Soft. There is tenderness (L sacral). There is no rebound, no  guarding and no CVA tenderness.   Musculoskeletal: Normal range of motion. She exhibits no edema.   Neurological: She is alert and oriented to person, place, and time. She has normal sensation and normal strength.   Skin: Skin is warm and dry. No rash noted.   Psychiatric: Mood and affect normal.   Nursing note and vitals reviewed.      LAB RESULTS  Lab Results (last 24 hours)     Procedure Component Value Units Date/Time    Urinalysis With / Culture If Indicated - Urine, Clean Catch [[id number]]  (Abnormal) Collected:  02/18/18 1136    Specimen:  Urine from Urine, Clean Catch Updated:  02/18/18 1159     Color, UA Red (A)      Any Substance that causes an abnormal urine color can alter the accuracy of the chemical reactions.        Appearance, UA Cloudy (A)     pH, UA 6.5     Specific Gravity, UA 1.015     Glucose, UA Negative     Ketones, UA Negative     Bilirubin, UA Negative     Blood, UA Large (3+) (A)     Protein, UA Trace (A)     Leuk Esterase, UA Small (1+) (A)     Nitrite, UA Negative     Urobilinogen, UA 0.2 E.U./dL    Urinalysis, Microscopic Only - Urine, Clean Catch [[id number]]  (Abnormal) Collected:  02/18/18 1136    Specimen:  Urine from Urine, Clean Catch Updated:  02/18/18 1159     RBC, UA Too Numerous to Count (A) /HPF      WBC, UA 3-5 (A) /HPF      Bacteria, UA None Seen /HPF      Squamous Epithelial Cells, UA 0-2 /HPF      Hyaline Casts, UA None Seen /LPF      Methodology Automated Microscopy    CBC & Differential [[id number]] Collected:  02/18/18 1139    Specimen:  Blood Updated:  02/18/18 1203    Narrative:       The following orders were created for panel order CBC & Differential.  Procedure                               Abnormality         Status                     ---------                               -----------         ------                     CBC Auto Differential[[id number]]        Abnormal            Final result                 Please view results for these tests on the individual  orders.    Comprehensive Metabolic Panel [[id number]]  (Abnormal) Collected:  02/18/18 1139    Specimen:  Blood Updated:  02/18/18 1219     Glucose 89 mg/dL      BUN 21 (H) mg/dL      Creatinine 0.96 mg/dL      Sodium 142 mmol/L      Potassium 3.8 mmol/L      Chloride 103 mmol/L      CO2 26.3 mmol/L      Calcium 8.6 mg/dL      Total Protein 6.9 g/dL      Albumin 4.50 g/dL      ALT (SGPT) 14 U/L      AST (SGOT) 22 U/L      Alkaline Phosphatase 60 U/L      Total Bilirubin 0.4 mg/dL      eGFR Non African Amer 60 (L) mL/min/1.73      eGFR  African Amer 73 mL/min/1.73      Globulin 2.4 gm/dL      A/G Ratio 1.9 g/dL      BUN/Creatinine Ratio 21.9     Anion Gap 12.7 mmol/L     Lipase [[id number]]  (Normal) Collected:  02/18/18 1139    Specimen:  Blood Updated:  02/18/18 1219     Lipase 45 U/L     CBC Auto Differential [[id number]]  (Abnormal) Collected:  02/18/18 1139    Specimen:  Blood Updated:  02/18/18 1203     WBC 4.64 10*3/mm3      RBC 4.51 10*6/mm3      Hemoglobin 13.6 g/dL      Hematocrit 42.2 %      MCV 93.6 fL      MCH 30.2 pg      MCHC 32.2 (L) g/dL      RDW 13.4 (H) %      RDW-SD 45.7 fl      MPV 10.3 fL      Platelets 230 10*3/mm3      Neutrophil % 50.9 %      Lymphocyte % 33.2 %      Monocyte % 11.6 %      Eosinophil % 3.4 %      Basophil % 0.9 %      Immature Grans % 0.0 %      Neutrophils, Absolute 2.36 10*3/mm3      Lymphocytes, Absolute 1.54 10*3/mm3      Monocytes, Absolute 0.54 10*3/mm3      Eosinophils, Absolute 0.16 10*3/mm3      Basophils, Absolute 0.04 10*3/mm3      Immature Grans, Absolute 0.00 10*3/mm3           I ordered the above labs and reviewed the results    RADIOLOGY  CT Abdomen Pelvis With Contrast   Final Result   EMERGENCY POSTCONTRAST CT ABDOMEN AND PELVIS     HISTORY: Female who is 58 years-old, with a history of  left flank pain  and hematuria presenting to the emergency room for evaluation.     PROTOCOL: Imaging was performed with standard technique.     Radiation dose reduction  techniques were utilized including automated  exposure control and exposure modulation based on body size.     COMPARISON: 11/28/2012 (noncontrast CT: normal kidneys bilaterally)     FINDINGS:  1. 16 mm benign left renal cyst.  2. 2 mm low attenuating upper pole right renal lesion too small to  characterize likely benign cyst.  3. No evidence of urolithiasis, normal ureters and bladder.  4. No obstruction, free air nor dilatation of bowel.  5. Several small liver cysts. The liver, gallbladder, biliary system,  spleen, pancreas and adrenal glands are otherwise normal.  6. Vascular calcification without aneurysm.  7. Surgical absence of uterus, no free fluid. 27 mm cyst left adnexa  requires follow-up.     I ordered the above noted radiological studies. Interpreted by radiologist. Reviewed by me in PACS.       PROCEDURES  Procedures      PROGRESS AND CONSULTS  ED Course     1122 Ordered blood work, lipase, and UA.    1227 Ordered CT abd/pelvis.    1421 D/w Dr. Pacheco pt's case and CT abd/pelvis which shows renal and adnexal cysts and agrees with treatment plan to discharge with instructions to f/u with urologist.    1431 Rechecked pt who states to be feeling better. Notified pt of CT abd/pelvis results which include cysts and negative labs. Discussed plan to discharge and advised to f/u with urologist. Pt understands and agrees with the plan, all questions answered.      MEDICAL DECISION MAKING  Results were reviewed/discussed with the patient and they were also made aware of online access. Pt also made aware that some labs, such as cultures, will not be resulted during ER visit and follow up with PMD is necessary.     MDM  Number of Diagnoses or Management Options  Hemorrhagic cystitis:      Amount and/or Complexity of Data Reviewed  Clinical lab tests: ordered and reviewed (TNTC RBCs in UA)  Tests in the radiology section of CPT®: ordered and reviewed (CT abd/pelvis shows renal and adnexal cysts.)    Patient  Progress  Patient progress: stable         DIAGNOSIS  Final diagnoses:   Hemorrhagic cystitis       DISPOSITION  DISCHARGE    Patient discharged in stable condition.    Reviewed implications of results, diagnosis, meds, responsibility to follow up, warning signs and symptoms of possible worsening, potential complications and reasons to return to ER.    Patient/Family voiced understanding of above instructions.    Discussed plan for discharge, as there is no emergent indication for admission.  Pt/family is agreeable and understands need for follow up and repeat testing.  Pt is aware that discharge does not mean that nothing is wrong but it indicates no emergency is present that requires admission and they must continue care with follow-up as given below or physician of their choice.     FOLLOW-UP  Shiva Smith MD  [address] [address]  [phone]    Call in 1 day  for urologist follow up         Medication List      New Prescriptions          cephalexin 500 MG capsule   Commonly known as:  KEFLEX   Take 1 capsule by mouth 3 (Three) Times a Day.               Latest Documented Vital Signs:  As of 2:57 PM  BP- 110/77 HR- 82 Temp- 97.9 °F (36.6 °C) (Tympanic) O2 sat- 100%    --  Documentation assistance provided by nestor Mar for Ck Arrieta PA-C.  Information recorded by the nestor was done at my direction and has been verified and validated by me.     Madhavi Mar  02/18/18 3650       Jean Marie Pacheco MD  02/18/18 1634

## 2018-02-18 NOTE — ED NOTES
Pt reports left flank pain that she thought could be related to left hip pain, constant pain worse with movement. Pt scheduled to have left hip replacement few weeks. Reports this morning blood in urine that has increased over the day denies pain with urination.     Tl Brown RN  02/18/18 [id number]

## 2018-02-18 NOTE — ED PROVIDER NOTES
Pt presents complaining of L flank pain that sometimes radiates down the left leg. Pt was given results of labs by the mid level. Discussed plan to have the pt follow up with a Urologist. Pt will be discharged. Pt understands and agrees with plan, all questions addressed.    I supervised care provided by the midlevel provider.    We have discussed this patient's history, physical exam, and treatment plan.   I have reviewed the note and personally saw and examined the patient and agree with the plan of care.    Documentation assistance provided by nestor Herrera for Dr. Pacheco.  Information recorded by the scribe was done at my direction and has been verified and validated by me.       Please note:  This is Dr Pacheco's supervising note.  My note was signed accidentally by Dr Pacheco.      Toni Herrera  02/18/18 3727       CARY Watson  02/18/18 [id number]

## 2018-02-19 LAB
ALBUMIN SERPL-MCNC: 4.4 G/DL (ref 3.5–5.2)
ALBUMIN/GLOB SERPL: 2.3 G/DL
ALP SERPL-CCNC: 54 U/L (ref 39–117)
ALT SERPL-CCNC: 14 U/L (ref 1–33)
AST SERPL-CCNC: 20 U/L (ref 1–32)
BILIRUB SERPL-MCNC: 0.2 MG/DL (ref 0.1–1.2)
BUN SERPL-MCNC: 21 MG/DL (ref 6–20)
BUN/CREAT SERPL: 21.6 (ref 7–25)
CALCIUM SERPL-MCNC: 8.6 MG/DL (ref 8.6–10.5)
CHLORIDE SERPL-SCNC: 103 MMOL/L (ref 98–107)
CHOLEST SERPL-MCNC: 195 MG/DL (ref 0–200)
CO2 SERPL-SCNC: 26.6 MMOL/L (ref 22–29)
CREAT SERPL-MCNC: 0.97 MG/DL (ref 0.57–1)
GFR SERPLBLD CREATININE-BSD FMLA CKD-EPI: 59 ML/MIN/1.73
GFR SERPLBLD CREATININE-BSD FMLA CKD-EPI: 72 ML/MIN/1.73
GLOBULIN SER CALC-MCNC: 1.9 GM/DL
GLUCOSE SERPL-MCNC: 85 MG/DL (ref 65–99)
HDLC SERPL-MCNC: 63 MG/DL (ref 40–60)
LDLC SERPL CALC-MCNC: 110 MG/DL (ref 0–100)
LDLC/HDLC SERPL: 1.75 {RATIO}
POTASSIUM SERPL-SCNC: 4.3 MMOL/L (ref 3.5–5.2)
PROT SERPL-MCNC: 6.3 G/DL (ref 6–8.5)
SODIUM SERPL-SCNC: 144 MMOL/L (ref 136–145)
TRIGL SERPL-MCNC: 108 MG/DL (ref 0–150)
TSH SERPL DL<=0.005 MIU/L-ACNC: 2.69 MIU/ML (ref 0.27–4.2)
VLDLC SERPL CALC-MCNC: 21.6 MG/DL (ref 5–40)

## 2018-02-20 ENCOUNTER — TELEPHONE (OUTPATIENT)
Dept: SOCIAL WORK | Facility: HOSPITAL | Age: 58
End: 2018-02-20

## 2018-02-20 NOTE — TELEPHONE ENCOUNTER
ED f/u phone call: states she is taking prescribed antibx, saw urologist today and is feeling better. No questions/concerns

## 2018-03-01 ENCOUNTER — HOSPITAL ENCOUNTER (OUTPATIENT)
Dept: GENERAL RADIOLOGY | Facility: HOSPITAL | Age: 58
Discharge: HOME | End: 2018-03-01

## 2018-03-01 ENCOUNTER — APPOINTMENT (OUTPATIENT)
Dept: PREADMISSION TESTING | Facility: HOSPITAL | Age: 58
End: 2018-03-01

## 2018-03-01 ENCOUNTER — HOSPITAL ENCOUNTER (OUTPATIENT)
Dept: GENERAL RADIOLOGY | Facility: HOSPITAL | Age: 58
Discharge: HOME | End: 2018-03-01
Admitting: ORTHOPAEDIC SURGERY

## 2018-03-01 VITALS
HEART RATE: 69 BPM | RESPIRATION RATE: 16 BRPM | HEIGHT: 67 IN | SYSTOLIC BLOOD PRESSURE: 112 MMHG | TEMPERATURE: 97 F | DIASTOLIC BLOOD PRESSURE: 79 MMHG | WEIGHT: 154 LBS | BODY MASS INDEX: 24.17 KG/M2 | OXYGEN SATURATION: 99 %

## 2018-03-01 LAB
AMORPH URATE CRY URNS QL MICRO: ABNORMAL /HPF
APTT PPP: 29.3 SECONDS (ref 22.7–35.4)
BACTERIA UR QL AUTO: ABNORMAL /HPF
BILIRUB UR QL STRIP: NEGATIVE
CLARITY UR: ABNORMAL
COLOR UR: YELLOW
GLUCOSE UR STRIP-MCNC: NEGATIVE MG/DL
HBA1C MFR BLD: 4.9 % (ref 4.8–5.6)
HGB UR QL STRIP.AUTO: NEGATIVE
HYALINE CASTS UR QL AUTO: ABNORMAL /LPF
INR PPP: 1.03 (ref 0.9–1.1)
KETONES UR QL STRIP: NEGATIVE
LEUKOCYTE ESTERASE UR QL STRIP.AUTO: ABNORMAL
NITRITE UR QL STRIP: NEGATIVE
PH UR STRIP.AUTO: 6.5 [PH] (ref 5–8)
PROT UR QL STRIP: NEGATIVE
PROTHROMBIN TIME: 13.3 SECONDS (ref 11.7–14.2)
RBC # UR: ABNORMAL /HPF
REF LAB TEST METHOD: ABNORMAL
SP GR UR STRIP: 1.02 (ref 1–1.03)
SQUAMOUS #/AREA URNS HPF: ABNORMAL /HPF
UROBILINOGEN UR QL STRIP: ABNORMAL
WBC UR QL AUTO: ABNORMAL /HPF
YEAST URNS QL MICRO: ABNORMAL /HPF

## 2018-03-01 PROCEDURE — 83036 HEMOGLOBIN GLYCOSYLATED A1C: CPT | Performed by: ORTHOPAEDIC SURGERY

## 2018-03-01 PROCEDURE — 85610 PROTHROMBIN TIME: CPT | Performed by: ORTHOPAEDIC SURGERY

## 2018-03-01 PROCEDURE — 93005 ELECTROCARDIOGRAM TRACING: CPT

## 2018-03-01 PROCEDURE — 85730 THROMBOPLASTIN TIME PARTIAL: CPT | Performed by: ORTHOPAEDIC SURGERY

## 2018-03-01 PROCEDURE — 36415 COLL VENOUS BLD VENIPUNCTURE: CPT

## 2018-03-01 PROCEDURE — 93010 ELECTROCARDIOGRAM REPORT: CPT | Performed by: INTERNAL MEDICINE

## 2018-03-01 PROCEDURE — 73502 X-RAY EXAM HIP UNI 2-3 VIEWS: CPT

## 2018-03-01 PROCEDURE — 81001 URINALYSIS AUTO W/SCOPE: CPT | Performed by: ORTHOPAEDIC SURGERY

## 2018-03-01 PROCEDURE — 71046 X-RAY EXAM CHEST 2 VIEWS: CPT

## 2018-03-01 RX ORDER — ACETAMINOPHEN 500 MG
500 TABLET ORAL EVERY 6 HOURS PRN
COMMUNITY

## 2018-03-01 RX ORDER — CALCITRIOL 0.5 UG/1
0.5 CAPSULE, LIQUID FILLED ORAL 2 TIMES DAILY
COMMUNITY

## 2018-03-01 RX ORDER — LEVOTHYROXINE SODIUM 0.1 MG/1
100 TABLET ORAL EVERY MORNING
Status: ON HOLD | COMMUNITY
End: 2018-03-16 | Stop reason: SDUPTHER

## 2018-03-01 NOTE — DISCHARGE INSTRUCTIONS
SURGERY 3-16-18  ARRIVAL TIME 9:00    Take the following medications the morning of surgery with a small sip of water:    NONE    General Instructions:  • Do not eat solid food after midnight the night before surgery.  • You may drink clear liquids day of surgery but must stop at least one hour before your hospital arrival time.  • It is beneficial for you to have a clear drink that contains carbohydrates the day of surgery.  We suggest a 12 to 20 ounce bottle of Gatorade or Powerade for non-diabetic patients or a 12 to 20 ounce bottle of G2 or Powerade Zero for diabetic patients. (Pediatric patients, are not advised to drink a 12 to 20 ounce carbohydrate drink)    Clear liquids are liquids you can see through.  Nothing red in color.     Plain water                               Sports drinks  Sodas                                   Gelatin (Jell-O)  Fruit juices without pulp such as white grape juice and apple juice  Popsicles that contain no fruit or yogurt  Tea or coffee (no cream or milk added)  Gatorade / Powerade  G2 / Powerade Zero    • Infants may have breast milk up to four hours before surgery.  • Infants drinking formula may drink formula up to six hours before surgery.   • Patients who avoid smoking, chewing tobacco and alcohol for 4 weeks prior to surgery have a reduced risk of post-operative complications.  Quit smoking as many days before surgery as you can.  • Do not smoke, use chewing tobacco or drink alcohol the day of surgery.   • If applicable bring your C-PAP/ BI-PAP machine.  • Bring any papers given to you in the doctor’s office.  • Wear clean comfortable clothes and socks.  • Do not wear contact lenses or make-up.  Bring a case for your glasses.   • Bring crutches or walker if applicable.  • Remove all piercings.  Leave jewelry and any other valuables at home.  • Hair extensions with metal clips must be removed prior to surgery.  • The Pre-Admission Testing nurse will instruct you to bring  medications if unable to obtain an accurate list in Pre-Admission Testing.        If you were given a blood bank ID arm band remember to bring it with you the day of surgery.    Preventing a Surgical Site Infection:  • For 2 to 3 days before surgery, avoid shaving with a razor because the razor can irritate skin and make it easier to develop an infection.  • The night prior to surgery sleep in a clean bed with clean clothing.  Do not allow pets to sleep with you.  • Shower on the morning of surgery using a fresh bar of anti-bacterial soap (such as Dial) and clean washcloth.  Dry with a clean towel and dress in clean clothing.  • Ask your surgeon if you will be receiving antibiotics prior to surgery.  • Make sure you, your family, and all healthcare providers clean their hands with soap and water or an alcohol based hand  before caring for you or your wound.    Day of surgery:  Upon arrival, a Pre-op nurse and Anesthesiologist will review your health history, obtain vital signs, and answer questions you may have.  The only belongings needed at this time will be your home medications and if applicable your C-PAP/BI-PAP machine.  If you are staying overnight your family can leave the rest of your belongings in the car and bring them to your room later.  A Pre-op nurse will start an IV and you may receive medication in preparation for surgery, including something to help you relax.  Your family will be able to see you in the Pre-op area.  While you are in surgery your family should notify the waiting room  if they leave the waiting room area and provide a contact phone number.    Please be aware that surgery does come with discomfort.  We want to make every effort to control your discomfort so please discuss any uncontrolled symptoms with your nurse.   Your doctor will most likely have prescribed pain medications.      If you are going home after surgery you will receive individualized written care  instructions before being discharged.  A responsible adult must drive you to and from the hospital on the day of your surgery and stay with you for 24 hours.    If you are staying overnight following surgery, you will be transported to your hospital room following the recovery period.  [facility] has all private rooms.    If you have any questions please call Pre-Admission Testing at [phone].  Deductibles and co-payments are collected on the day of service. Please be prepared to pay the required co-pay, deductible or deposit on the day of service as defined by your plan.USE AS DIRECTED    2% CHLORAHEXIDINE GLUCONATE* CLOTH  Preparing or “prepping” skin before surgery can reduce the risk of infection at the surgical site. To make the process easier, [facility] has chosen disposable cloths moistened with a rinse-free, 2% Chlorhexidine Gluconate (CHG) antiseptic solution. The steps below outline the prepping process and should be carefully followed.        Use the prep cloth on the area that is circled in the diagram             Directions Night before Surgery  1) Shower using a fresh bar of anti-bacterial soap (such as Dial) and clean washcloth.  Use a clean towel to completely dry your skin.  2) Do not use any lotions, oils or creams on your skin.  3) Open the package and remove 1 cloth, wipe your skin for 30 seconds in a circular motion.  Allow to dry for 3 minutes.  4) Repeat #3 with second cloth.  5) Do not touch your eyes, ears, or mouth with the prep cloth.  6) Allow the wet prep solution to air dry.  7) Discard the prep cloth and wash your hands with soap and water.   8) Dress in clean bed clothes and sleep on fresh clean bed sheets.   9) You may experience some temporary itching after the prep.    Directions Day of Surgery  1) Repeat steps 1,2,3,4,5,6,7, and 9.   2) Dress in clean clothes before coming to the hospital.    BACTROBAN NASAL OINTMENT  There are many germs normally in  your nose. Bactroban is an ointment that will help reduce these germs. Please follow these instructions for Bactroban use:    ____Two days before surgery in the evening Date________    ____The day before surgery in the morning  Date________    ____The day before surgery in the evening              Date________    ____The day of surgery in the morning    Date________    **Squirt ½ package of Bactroban Ointment onto a cotton applicator and apply to inside of 1st nostril.  Squirt the remaining Bactroban and apply to the inside of the other nostril.

## 2018-03-05 ENCOUNTER — TELEPHONE (OUTPATIENT)
Dept: FAMILY MEDICINE CLINIC | Facility: CLINIC | Age: 58
End: 2018-03-05

## 2018-03-05 NOTE — TELEPHONE ENCOUNTER
Dr Cervantes will addend note now that we received all test results. I will fax the clearance to her doctor.     ----- Message from Antonina De La Rosa sent at 3/2/2018  9:22 AM EST -----  PATIENT CAME IN FOR A SURGERY CLEARANCE AND SAID DR CERVANTES DIDN'T WANT TO WRITE THE RELEASE YET UNTIL THE PRE OP WAS DONE AND HER LABS WERE BACK. SHE CALLED TODAY TO SAY THAT SHE DID THE PRE OP AND THAT THE LABS ARE BACK. PATIENTS HIP SURGERY IS ON THE 16 OF MARCH AND WANTED TO KNOW IF SHE COULD GET THE RELEASE LETTER.    PLEASE CALL PT BACK [phone]    ALSO WANTED TO LET DR BOURGEOIS KNOW SHE HAS HAD A FLU SHOT.

## 2018-03-16 ENCOUNTER — HOSPITAL ENCOUNTER (INPATIENT)
Facility: HOSPITAL | Age: 58
LOS: 1 days | Discharge: HOME HEALTH | End: 2018-03-17
Attending: ORTHOPAEDIC SURGERY | Admitting: ORTHOPAEDIC SURGERY

## 2018-03-16 ENCOUNTER — ANESTHESIA EVENT (OUTPATIENT)
Dept: PERIOP | Facility: HOSPITAL | Age: 58
End: 2018-03-16

## 2018-03-16 ENCOUNTER — APPOINTMENT (OUTPATIENT)
Dept: GENERAL RADIOLOGY | Facility: HOSPITAL | Age: 58
End: 2018-03-16

## 2018-03-16 ENCOUNTER — ANESTHESIA (OUTPATIENT)
Dept: PERIOP | Facility: HOSPITAL | Age: 58
End: 2018-03-16

## 2018-03-16 DIAGNOSIS — M16.12 PRIMARY OSTEOARTHRITIS OF LEFT HIP: Chronic | ICD-10-CM

## 2018-03-16 DIAGNOSIS — R26.89 DECREASED MOBILITY: Primary | ICD-10-CM

## 2018-03-16 PROBLEM — M16.10 OSTEOARTHRITIS OF ONE HIP: Status: ACTIVE | Noted: 2018-03-16

## 2018-03-16 PROCEDURE — C1776 JOINT DEVICE (IMPLANTABLE): HCPCS | Performed by: ORTHOPAEDIC SURGERY

## 2018-03-16 PROCEDURE — 25010000002 FENTANYL CITRATE (PF) 100 MCG/2ML SOLUTION: Performed by: ANESTHESIOLOGY

## 2018-03-16 PROCEDURE — 25010000002 DIPHENHYDRAMINE PER 50 MG

## 2018-03-16 PROCEDURE — 25010000002 ROPIVACAINE PER 1 MG: Performed by: ORTHOPAEDIC SURGERY

## 2018-03-16 PROCEDURE — 25010000002 MORPHINE SULFATE (PF) 2 MG/ML SOLUTION: Performed by: ANESTHESIOLOGY

## 2018-03-16 PROCEDURE — 73501 X-RAY EXAM HIP UNI 1 VIEW: CPT

## 2018-03-16 PROCEDURE — 25010000002 HYDROMORPHONE HCL PF 500 MG/50ML SOLUTION: Performed by: ORTHOPAEDIC SURGERY

## 2018-03-16 PROCEDURE — 25010000002 NEOSTIGMINE PER 0.5 MG: Performed by: ANESTHESIOLOGY

## 2018-03-16 PROCEDURE — 0SRB04A REPLACEMENT OF LEFT HIP JOINT WITH CERAMIC ON POLYETHYLENE SYNTHETIC SUBSTITUTE, UNCEMENTED, OPEN APPROACH: ICD-10-PCS | Performed by: ORTHOPAEDIC SURGERY

## 2018-03-16 PROCEDURE — 25010000002 PROPOFOL 10 MG/ML EMULSION: Performed by: ANESTHESIOLOGY

## 2018-03-16 PROCEDURE — 76000 FLUOROSCOPY <1 HR PHYS/QHP: CPT

## 2018-03-16 PROCEDURE — 97110 THERAPEUTIC EXERCISES: CPT

## 2018-03-16 PROCEDURE — 25010000002 MORPHINE PER 10 MG: Performed by: ANESTHESIOLOGY

## 2018-03-16 PROCEDURE — C1713 ANCHOR/SCREW BN/BN,TIS/BN: HCPCS | Performed by: ORTHOPAEDIC SURGERY

## 2018-03-16 PROCEDURE — 25010000002 CLONIDINE PER 1 MG: Performed by: ORTHOPAEDIC SURGERY

## 2018-03-16 PROCEDURE — 25010000002 DEXAMETHASONE PER 1 MG: Performed by: ANESTHESIOLOGY

## 2018-03-16 PROCEDURE — 25010000002 MIDAZOLAM PER 1 MG: Performed by: ANESTHESIOLOGY

## 2018-03-16 PROCEDURE — 25010000002 PHENYLEPHRINE PER 1 ML: Performed by: ANESTHESIOLOGY

## 2018-03-16 PROCEDURE — 97161 PT EVAL LOW COMPLEX 20 MIN: CPT

## 2018-03-16 PROCEDURE — 25010000003 CEFAZOLIN IN DEXTROSE 2-4 GM/100ML-% SOLUTION: Performed by: ORTHOPAEDIC SURGERY

## 2018-03-16 PROCEDURE — 25010000002 ONDANSETRON PER 1 MG: Performed by: ANESTHESIOLOGY

## 2018-03-16 PROCEDURE — 94799 UNLISTED PULMONARY SVC/PX: CPT

## 2018-03-16 DEVICE — PINNACLE GRIPTION ACETABULAR SHELL SECTOR 48MM OD
Type: IMPLANTABLE DEVICE | Site: HIP | Status: FUNCTIONAL
Brand: PINNACLE GRIPTION

## 2018-03-16 DEVICE — PINNACLE HIP SOLUTIONS ALTRX POLYETHYLENE ACETABULAR LINER NEUTRAL 32MM ID 48MM OD
Type: IMPLANTABLE DEVICE | Site: HIP | Status: FUNCTIONAL
Brand: PINNACLE ALTRX

## 2018-03-16 DEVICE — TOTL HIP GRIPTION CUP DEPUY UPCHRG: Type: IMPLANTABLE DEVICE | Site: HIP | Status: FUNCTIONAL

## 2018-03-16 DEVICE — ACTIS DUOFIX HIP PROSTHESIS (FEMORAL STEM 12/14 TAPER CEMENTLESS SIZE 4 STD COLLAR)  CE
Type: IMPLANTABLE DEVICE | Site: HIP | Status: FUNCTIONAL
Brand: ACTIS

## 2018-03-16 DEVICE — TOTL HIP COA DEPUY 9641334: Type: IMPLANTABLE DEVICE | Site: HIP | Status: FUNCTIONAL

## 2018-03-16 DEVICE — BIOLOX DELTA CERAMIC FEMORAL HEAD 32MM DIA +5.0 12/14 TAPER
Type: IMPLANTABLE DEVICE | Site: HIP | Status: FUNCTIONAL
Brand: BIOLOX DELTA

## 2018-03-16 DEVICE — TOTL HIP STEM DEPUY UPCHRG: Type: IMPLANTABLE DEVICE | Site: HIP | Status: FUNCTIONAL

## 2018-03-16 RX ORDER — DIPHENHYDRAMINE HYDROCHLORIDE 50 MG/ML
12.5 INJECTION INTRAMUSCULAR; INTRAVENOUS
Status: DISCONTINUED | OUTPATIENT
Start: 2018-03-16 | End: 2018-03-16 | Stop reason: HOSPADM

## 2018-03-16 RX ORDER — CEFAZOLIN SODIUM 2 G/100ML
2 INJECTION, SOLUTION INTRAVENOUS ONCE
Status: DISCONTINUED | OUTPATIENT
Start: 2018-03-16 | End: 2018-03-16 | Stop reason: HOSPADM

## 2018-03-16 RX ORDER — LABETALOL HYDROCHLORIDE 5 MG/ML
5 INJECTION, SOLUTION INTRAVENOUS
Status: DISCONTINUED | OUTPATIENT
Start: 2018-03-16 | End: 2018-03-16 | Stop reason: HOSPADM

## 2018-03-16 RX ORDER — PROPOFOL 10 MG/ML
VIAL (ML) INTRAVENOUS AS NEEDED
Status: DISCONTINUED | OUTPATIENT
Start: 2018-03-16 | End: 2018-03-16 | Stop reason: SURG

## 2018-03-16 RX ORDER — LEVOTHYROXINE SODIUM 137 UG/1
0.88 TABLET ORAL DAILY
COMMUNITY
Start: 2018-03-16 | End: 2020-02-25 | Stop reason: DRUGHIGH

## 2018-03-16 RX ORDER — ACETAMINOPHEN 325 MG/1
325 TABLET ORAL EVERY 4 HOURS PRN
Status: DISCONTINUED | OUTPATIENT
Start: 2018-03-16 | End: 2018-03-17 | Stop reason: HOSPADM

## 2018-03-16 RX ORDER — EPHEDRINE SULFATE 50 MG/ML
5 INJECTION, SOLUTION INTRAVENOUS ONCE AS NEEDED
Status: DISCONTINUED | OUTPATIENT
Start: 2018-03-16 | End: 2018-03-16 | Stop reason: HOSPADM

## 2018-03-16 RX ORDER — TOPIRAMATE 50 MG/1
50 TABLET, FILM COATED ORAL NIGHTLY
Status: DISCONTINUED | OUTPATIENT
Start: 2018-03-16 | End: 2018-03-17 | Stop reason: HOSPADM

## 2018-03-16 RX ORDER — NALOXONE HCL 0.4 MG/ML
0.4 VIAL (ML) INJECTION AS NEEDED
Status: DISCONTINUED | OUTPATIENT
Start: 2018-03-16 | End: 2018-03-16 | Stop reason: HOSPADM

## 2018-03-16 RX ORDER — ASPIRIN 325 MG
325 TABLET, DELAYED RELEASE (ENTERIC COATED) ORAL EVERY 12 HOURS SCHEDULED
Status: DISCONTINUED | OUTPATIENT
Start: 2018-03-17 | End: 2018-03-16

## 2018-03-16 RX ORDER — ESTRADIOL 0.1 MG/G
1 CREAM VAGINAL 2 TIMES WEEKLY
Refills: 6 | COMMUNITY
Start: 2018-03-05

## 2018-03-16 RX ORDER — CEFAZOLIN SODIUM 2 G/100ML
2 INJECTION, SOLUTION INTRAVENOUS ONCE
Status: COMPLETED | OUTPATIENT
Start: 2018-03-16 | End: 2018-03-16

## 2018-03-16 RX ORDER — DOCUSATE SODIUM 100 MG/1
100 CAPSULE, LIQUID FILLED ORAL 2 TIMES DAILY PRN
Status: DISCONTINUED | OUTPATIENT
Start: 2018-03-16 | End: 2018-03-17 | Stop reason: HOSPADM

## 2018-03-16 RX ORDER — CEFAZOLIN SODIUM 2 G/100ML
2 INJECTION, SOLUTION INTRAVENOUS EVERY 8 HOURS
Status: COMPLETED | OUTPATIENT
Start: 2018-03-16 | End: 2018-03-17

## 2018-03-16 RX ORDER — DEXAMETHASONE SODIUM PHOSPHATE 10 MG/ML
INJECTION INTRAMUSCULAR; INTRAVENOUS AS NEEDED
Status: DISCONTINUED | OUTPATIENT
Start: 2018-03-16 | End: 2018-03-16 | Stop reason: SURG

## 2018-03-16 RX ORDER — SODIUM CHLORIDE 9 MG/ML
100 INJECTION, SOLUTION INTRAVENOUS CONTINUOUS
Status: DISCONTINUED | OUTPATIENT
Start: 2018-03-16 | End: 2018-03-17 | Stop reason: HOSPADM

## 2018-03-16 RX ORDER — FAMOTIDINE 10 MG/ML
20 INJECTION, SOLUTION INTRAVENOUS ONCE
Status: COMPLETED | OUTPATIENT
Start: 2018-03-16 | End: 2018-03-16

## 2018-03-16 RX ORDER — ONDANSETRON 2 MG/ML
INJECTION INTRAMUSCULAR; INTRAVENOUS AS NEEDED
Status: DISCONTINUED | OUTPATIENT
Start: 2018-03-16 | End: 2018-03-16 | Stop reason: SURG

## 2018-03-16 RX ORDER — ROCURONIUM BROMIDE 10 MG/ML
INJECTION, SOLUTION INTRAVENOUS AS NEEDED
Status: DISCONTINUED | OUTPATIENT
Start: 2018-03-16 | End: 2018-03-16 | Stop reason: SURG

## 2018-03-16 RX ORDER — SODIUM CHLORIDE, SODIUM LACTATE, POTASSIUM CHLORIDE, CALCIUM CHLORIDE 600; 310; 30; 20 MG/100ML; MG/100ML; MG/100ML; MG/100ML
100 INJECTION, SOLUTION INTRAVENOUS CONTINUOUS
Status: DISCONTINUED | OUTPATIENT
Start: 2018-03-16 | End: 2018-03-17

## 2018-03-16 RX ORDER — SODIUM CHLORIDE 0.9 % (FLUSH) 0.9 %
1-10 SYRINGE (ML) INJECTION AS NEEDED
Status: DISCONTINUED | OUTPATIENT
Start: 2018-03-16 | End: 2018-03-16 | Stop reason: HOSPADM

## 2018-03-16 RX ORDER — HYDROMORPHONE HYDROCHLORIDE 1 MG/ML
0.5 INJECTION, SOLUTION INTRAMUSCULAR; INTRAVENOUS; SUBCUTANEOUS
Status: DISCONTINUED | OUTPATIENT
Start: 2018-03-16 | End: 2018-03-17 | Stop reason: HOSPADM

## 2018-03-16 RX ORDER — NALOXONE HCL 0.4 MG/ML
0.1 VIAL (ML) INJECTION
Status: DISCONTINUED | OUTPATIENT
Start: 2018-03-16 | End: 2018-03-17 | Stop reason: HOSPADM

## 2018-03-16 RX ORDER — FENTANYL CITRATE 50 UG/ML
50 INJECTION, SOLUTION INTRAMUSCULAR; INTRAVENOUS
Status: DISCONTINUED | OUTPATIENT
Start: 2018-03-16 | End: 2018-03-16 | Stop reason: HOSPADM

## 2018-03-16 RX ORDER — BISACODYL 5 MG/1
10 TABLET, DELAYED RELEASE ORAL DAILY PRN
Status: DISCONTINUED | OUTPATIENT
Start: 2018-03-16 | End: 2018-03-17 | Stop reason: HOSPADM

## 2018-03-16 RX ORDER — SENNA AND DOCUSATE SODIUM 50; 8.6 MG/1; MG/1
2 TABLET, FILM COATED ORAL 2 TIMES DAILY PRN
Status: DISCONTINUED | OUTPATIENT
Start: 2018-03-16 | End: 2018-03-17 | Stop reason: HOSPADM

## 2018-03-16 RX ORDER — ONDANSETRON 4 MG/1
4 TABLET, ORALLY DISINTEGRATING ORAL EVERY 6 HOURS PRN
Status: DISCONTINUED | OUTPATIENT
Start: 2018-03-16 | End: 2018-03-17 | Stop reason: HOSPADM

## 2018-03-16 RX ORDER — ONDANSETRON 4 MG/1
4 TABLET, FILM COATED ORAL EVERY 6 HOURS PRN
Status: DISCONTINUED | OUTPATIENT
Start: 2018-03-16 | End: 2018-03-17 | Stop reason: HOSPADM

## 2018-03-16 RX ORDER — MORPHINE SULFATE 1 MG/ML
INJECTION, SOLUTION EPIDURAL; INTRATHECAL; INTRAVENOUS AS NEEDED
Status: DISCONTINUED | OUTPATIENT
Start: 2018-03-16 | End: 2018-03-16 | Stop reason: SURG

## 2018-03-16 RX ORDER — MEPERIDINE HYDROCHLORIDE 25 MG/ML
12.5 INJECTION INTRAMUSCULAR; INTRAVENOUS; SUBCUTANEOUS ONCE
Status: DISCONTINUED | OUTPATIENT
Start: 2018-03-16 | End: 2018-03-16 | Stop reason: HOSPADM

## 2018-03-16 RX ORDER — HYDROCODONE BITARTRATE AND ACETAMINOPHEN 7.5; 325 MG/1; MG/1
1 TABLET ORAL EVERY 4 HOURS PRN
Status: DISCONTINUED | OUTPATIENT
Start: 2018-03-16 | End: 2018-03-17 | Stop reason: HOSPADM

## 2018-03-16 RX ORDER — HYDROCODONE BITARTRATE AND ACETAMINOPHEN 7.5; 325 MG/1; MG/1
1 TABLET ORAL EVERY 4 HOURS PRN
Qty: 60 TABLET | Refills: 0 | Status: SHIPPED | OUTPATIENT
Start: 2018-03-16 | End: 2018-03-17

## 2018-03-16 RX ORDER — MORPHINE SULFATE 2 MG/ML
2 INJECTION, SOLUTION INTRAMUSCULAR; INTRAVENOUS
Status: DISCONTINUED | OUTPATIENT
Start: 2018-03-16 | End: 2018-03-16 | Stop reason: HOSPADM

## 2018-03-16 RX ORDER — SODIUM CHLORIDE, SODIUM LACTATE, POTASSIUM CHLORIDE, CALCIUM CHLORIDE 600; 310; 30; 20 MG/100ML; MG/100ML; MG/100ML; MG/100ML
9 INJECTION, SOLUTION INTRAVENOUS CONTINUOUS
Status: DISCONTINUED | OUTPATIENT
Start: 2018-03-16 | End: 2018-03-16 | Stop reason: HOSPADM

## 2018-03-16 RX ORDER — ONDANSETRON 2 MG/ML
4 INJECTION INTRAMUSCULAR; INTRAVENOUS ONCE AS NEEDED
Status: DISCONTINUED | OUTPATIENT
Start: 2018-03-16 | End: 2018-03-16 | Stop reason: HOSPADM

## 2018-03-16 RX ORDER — HYDROCODONE BITARTRATE AND ACETAMINOPHEN 7.5; 325 MG/1; MG/1
2 TABLET ORAL EVERY 4 HOURS PRN
Status: DISCONTINUED | OUTPATIENT
Start: 2018-03-16 | End: 2018-03-17 | Stop reason: HOSPADM

## 2018-03-16 RX ORDER — LEVOTHYROXINE SODIUM 0.1 MG/1
100 TABLET ORAL DAILY
Status: DISCONTINUED | OUTPATIENT
Start: 2018-03-16 | End: 2018-03-17 | Stop reason: HOSPADM

## 2018-03-16 RX ORDER — GLYCOPYRROLATE 0.2 MG/ML
INJECTION INTRAMUSCULAR; INTRAVENOUS AS NEEDED
Status: DISCONTINUED | OUTPATIENT
Start: 2018-03-16 | End: 2018-03-16 | Stop reason: SURG

## 2018-03-16 RX ORDER — MIDAZOLAM HYDROCHLORIDE 1 MG/ML
2 INJECTION INTRAMUSCULAR; INTRAVENOUS
Status: DISCONTINUED | OUTPATIENT
Start: 2018-03-16 | End: 2018-03-16 | Stop reason: HOSPADM

## 2018-03-16 RX ORDER — MAGNESIUM HYDROXIDE 1200 MG/15ML
LIQUID ORAL AS NEEDED
Status: DISCONTINUED | OUTPATIENT
Start: 2018-03-16 | End: 2018-03-16 | Stop reason: HOSPADM

## 2018-03-16 RX ORDER — BISACODYL 10 MG
10 SUPPOSITORY, RECTAL RECTAL DAILY PRN
Status: DISCONTINUED | OUTPATIENT
Start: 2018-03-16 | End: 2018-03-17 | Stop reason: HOSPADM

## 2018-03-16 RX ORDER — LIDOCAINE HYDROCHLORIDE 10 MG/ML
0.5 INJECTION, SOLUTION EPIDURAL; INFILTRATION; INTRACAUDAL; PERINEURAL ONCE AS NEEDED
Status: DISCONTINUED | OUTPATIENT
Start: 2018-03-16 | End: 2018-03-16 | Stop reason: HOSPADM

## 2018-03-16 RX ORDER — ONDANSETRON 2 MG/ML
4 INJECTION INTRAMUSCULAR; INTRAVENOUS EVERY 6 HOURS PRN
Status: DISCONTINUED | OUTPATIENT
Start: 2018-03-16 | End: 2018-03-17 | Stop reason: HOSPADM

## 2018-03-16 RX ORDER — MIDAZOLAM HYDROCHLORIDE 1 MG/ML
1 INJECTION INTRAMUSCULAR; INTRAVENOUS
Status: DISCONTINUED | OUTPATIENT
Start: 2018-03-16 | End: 2018-03-16 | Stop reason: HOSPADM

## 2018-03-16 RX ORDER — OXYCODONE HCL 10 MG/1
10 TABLET, FILM COATED, EXTENDED RELEASE ORAL ONCE
Status: COMPLETED | OUTPATIENT
Start: 2018-03-16 | End: 2018-03-16

## 2018-03-16 RX ORDER — CIPROFLOXACIN 500 MG/1
500 TABLET, FILM COATED ORAL 2 TIMES DAILY
COMMUNITY
End: 2018-11-28

## 2018-03-16 RX ORDER — DIPHENHYDRAMINE HYDROCHLORIDE 50 MG/ML
INJECTION INTRAMUSCULAR; INTRAVENOUS
Status: COMPLETED
Start: 2018-03-16 | End: 2018-03-16

## 2018-03-16 RX ORDER — FENTANYL CITRATE 50 UG/ML
25 INJECTION, SOLUTION INTRAMUSCULAR; INTRAVENOUS
Status: DISCONTINUED | OUTPATIENT
Start: 2018-03-16 | End: 2018-03-16 | Stop reason: HOSPADM

## 2018-03-16 RX ORDER — UREA 10 %
1 LOTION (ML) TOPICAL NIGHTLY PRN
Status: DISCONTINUED | OUTPATIENT
Start: 2018-03-16 | End: 2018-03-17 | Stop reason: HOSPADM

## 2018-03-16 RX ORDER — ACETAMINOPHEN 500 MG
1000 TABLET ORAL ONCE
Status: COMPLETED | OUTPATIENT
Start: 2018-03-16 | End: 2018-03-16

## 2018-03-16 RX ORDER — HYDROCODONE BITARTRATE AND ACETAMINOPHEN 5; 325 MG/1; MG/1
1 TABLET ORAL ONCE AS NEEDED
Status: DISCONTINUED | OUTPATIENT
Start: 2018-03-16 | End: 2018-03-16 | Stop reason: HOSPADM

## 2018-03-16 RX ORDER — SCOLOPAMINE TRANSDERMAL SYSTEM 1 MG/1
1 PATCH, EXTENDED RELEASE TRANSDERMAL ONCE
Status: DISCONTINUED | OUTPATIENT
Start: 2018-03-16 | End: 2018-03-17

## 2018-03-16 RX ADMIN — PROPOFOL 150 MG: 10 INJECTION, EMULSION INTRAVENOUS at 11:30

## 2018-03-16 RX ADMIN — ALFENTANIL HYDROCHLORIDE 500 MCG: 500 INJECTION INTRAVENOUS at 11:45

## 2018-03-16 RX ADMIN — MIDAZOLAM 2 MG: 1 INJECTION INTRAMUSCULAR; INTRAVENOUS at 10:38

## 2018-03-16 RX ADMIN — TOPIRAMATE 50 MG: 50 TABLET, FILM COATED ORAL at 20:14

## 2018-03-16 RX ADMIN — PHENYLEPHRINE HYDROCHLORIDE 50 MCG: 10 INJECTION INTRAVENOUS at 12:30

## 2018-03-16 RX ADMIN — DEXAMETHASONE SODIUM PHOSPHATE 6 MG: 10 INJECTION INTRAMUSCULAR; INTRAVENOUS at 12:00

## 2018-03-16 RX ADMIN — MORPHINE SULFATE 2 MG: 2 INJECTION, SOLUTION INTRAMUSCULAR; INTRAVENOUS at 14:34

## 2018-03-16 RX ADMIN — CEFAZOLIN SODIUM 2 G: 2 INJECTION, SOLUTION INTRAVENOUS at 11:46

## 2018-03-16 RX ADMIN — TRANEXAMIC ACID 1000 MG: 100 INJECTION, SOLUTION INTRAVENOUS at 11:53

## 2018-03-16 RX ADMIN — PHENYLEPHRINE HYDROCHLORIDE 100 MCG: 10 INJECTION INTRAVENOUS at 12:00

## 2018-03-16 RX ADMIN — FENTANYL CITRATE 25 MCG: 50 INJECTION INTRAMUSCULAR; INTRAVENOUS at 14:57

## 2018-03-16 RX ADMIN — MORPHINE SULFATE 2 MG: 1 INJECTION EPIDURAL; INTRATHECAL; INTRAVENOUS at 13:15

## 2018-03-16 RX ADMIN — OXYCODONE HYDROCHLORIDE 10 MG: 10 TABLET, FILM COATED, EXTENDED RELEASE ORAL at 09:58

## 2018-03-16 RX ADMIN — ROCURONIUM BROMIDE 35 MG: 10 INJECTION INTRAVENOUS at 11:30

## 2018-03-16 RX ADMIN — ALFENTANIL HYDROCHLORIDE 500 MCG: 500 INJECTION INTRAVENOUS at 11:33

## 2018-03-16 RX ADMIN — FAMOTIDINE 20 MG: 10 INJECTION INTRAVENOUS at 10:37

## 2018-03-16 RX ADMIN — PROPOFOL 50 MG: 10 INJECTION, EMULSION INTRAVENOUS at 11:41

## 2018-03-16 RX ADMIN — ACETAMINOPHEN 1000 MG: 500 TABLET ORAL at 09:58

## 2018-03-16 RX ADMIN — FENTANYL CITRATE 25 MCG: 50 INJECTION INTRAMUSCULAR; INTRAVENOUS at 13:56

## 2018-03-16 RX ADMIN — HYDROCODONE BITARTRATE AND ACETAMINOPHEN 2 TABLET: 7.5; 325 TABLET ORAL at 18:28

## 2018-03-16 RX ADMIN — HYDROMORPHONE HYDROCHLORIDE 0.5 MG: 10 INJECTION INTRAMUSCULAR; INTRAVENOUS; SUBCUTANEOUS at 16:48

## 2018-03-16 RX ADMIN — SODIUM CHLORIDE, POTASSIUM CHLORIDE, SODIUM LACTATE AND CALCIUM CHLORIDE 9 ML/HR: 600; 310; 30; 20 INJECTION, SOLUTION INTRAVENOUS at 10:38

## 2018-03-16 RX ADMIN — PHENYLEPHRINE HYDROCHLORIDE 50 MCG: 10 INJECTION INTRAVENOUS at 12:45

## 2018-03-16 RX ADMIN — DIPHENHYDRAMINE HYDROCHLORIDE 12.5 MG: 50 INJECTION INTRAMUSCULAR; INTRAVENOUS at 14:57

## 2018-03-16 RX ADMIN — MEPERIDINE HYDROCHLORIDE 12.5 MG: 25 INJECTION INTRAMUSCULAR; INTRAVENOUS; SUBCUTANEOUS at 19:46

## 2018-03-16 RX ADMIN — GLYCOPYRROLATE 0.5 MG: 0.2 INJECTION INTRAMUSCULAR; INTRAVENOUS at 13:00

## 2018-03-16 RX ADMIN — FENTANYL CITRATE 25 MCG: 50 INJECTION INTRAMUSCULAR; INTRAVENOUS at 13:36

## 2018-03-16 RX ADMIN — MORPHINE SULFATE 2 MG: 1 INJECTION EPIDURAL; INTRATHECAL; INTRAVENOUS at 13:10

## 2018-03-16 RX ADMIN — ONDANSETRON 4 MG: 2 INJECTION INTRAMUSCULAR; INTRAVENOUS at 12:45

## 2018-03-16 RX ADMIN — NEOSTIGMINE METHYLSULFATE 2.5 MG: 1 INJECTION INTRAMUSCULAR; INTRAVENOUS; SUBCUTANEOUS at 13:00

## 2018-03-16 RX ADMIN — CEFAZOLIN SODIUM 2 G: 2 INJECTION, SOLUTION INTRAVENOUS at 20:14

## 2018-03-16 RX ADMIN — HYDROCODONE BITARTRATE AND ACETAMINOPHEN 2 TABLET: 7.5; 325 TABLET ORAL at 22:37

## 2018-03-16 RX ADMIN — SCOPALAMINE 1 PATCH: 1 PATCH, EXTENDED RELEASE TRANSDERMAL at 10:38

## 2018-03-16 RX ADMIN — PHENYLEPHRINE HYDROCHLORIDE 50 MCG: 10 INJECTION INTRAVENOUS at 12:15

## 2018-03-16 RX ADMIN — SODIUM CHLORIDE, POTASSIUM CHLORIDE, SODIUM LACTATE AND CALCIUM CHLORIDE: 600; 310; 30; 20 INJECTION, SOLUTION INTRAVENOUS at 11:25

## 2018-03-16 NOTE — PLAN OF CARE
Problem: Patient Care Overview  Goal: Plan of Care Review   03/16/18 6003   Coping/Psychosocial   Plan of Care Reviewed With patient;spouse   Plan of Care Review   Progress improving   OTHER   Outcome Summary Admit to floor. LTH (Ant) w/Madhavi Table. VSS, NVS, Up to chair, voiding / bathroom. Pain well controlled. DC w/ Home Health in am is the plan. Educated on Hx of UTI and patient acknnowledge understanding.      Goal: Individualization and Mutuality  Outcome: Ongoing (interventions implemented as appropriate)    Goal: Discharge Needs Assessment  Outcome: Ongoing (interventions implemented as appropriate)    Goal: Interprofessional Rounds/Family Conf  Outcome: Ongoing (interventions implemented as appropriate)      Problem: Hip Arthroplasty (Total, Partial) (Adult)  Goal: Signs and Symptoms of Listed Potential Problems Will be Absent, Minimized or Managed (Hip Arthroplasty)  Outcome: Ongoing (interventions implemented as appropriate)    Goal: Anesthesia/Sedation Recovery  Outcome: Ongoing (interventions implemented as appropriate)      Problem: Fall Risk (Adult)  Goal: Identify Related Risk Factors and Signs and Symptoms  Outcome: Ongoing (interventions implemented as appropriate)    Goal: Absence of Fall  Outcome: Ongoing (interventions implemented as appropriate)      Problem: Urinary Tract Infection (Adult)  Goal: Signs and Symptoms of Listed Potential Problems Will be Absent, Minimized or Managed (Urinary Tract Infection)  Outcome: Ongoing (interventions implemented as appropriate)

## 2018-03-16 NOTE — ANESTHESIA PROCEDURE NOTES
Airway  Urgency: elective      General Information and Staff    Patient location during procedure: OR  Anesthesiologist: HARRIETT BULL    Indications and Patient Condition    Preoxygenated: yes  Mask difficulty assessment: 1 - vent by mask    Final Airway Details  Final airway type: endotracheal airway      Successful airway: ETT  Cuffed: yes   Successful intubation technique: direct laryngoscopy  Blade: Charlie  Blade size: #3  Cormack-Lehane Classification: grade I - full view of glottis  Placement verified by: capnometry   Measured from: teeth  Number of attempts at approach: 1

## 2018-03-16 NOTE — ANESTHESIA POSTPROCEDURE EVALUATION
"Patient: Keila Caldwell    Procedure Summary     Date:  03/16/18 Room / Location:  [facility] OR [address] [place] [address] MAIN OR    Anesthesia Start:  1127 Anesthesia Stop:  1329    Procedure:  LT TOTAL HIP ARTHROPLASTY ANTERIOR WITH HANA TABLE (Left Hip) Diagnosis:       Osteoarthritis of left hip      (Osteoarthritis of left hip)    Surgeon:  Sergio Baledras MD Provider:  Lisa Crum MD    Anesthesia Type:  general ASA Status:  2          Anesthesia Type: general  Last vitals  BP   96/67 (03/16/18 1515)   Temp   37 °C (98.6 °F) (03/16/18 1500)   Pulse   82 (03/16/18 1515)   Resp   16 (03/16/18 1515)     SpO2   100 % (03/16/18 1515)     Post Anesthesia Care and Evaluation    Patient location during evaluation: bedside  Patient participation: complete - patient participated  Level of consciousness: sleepy but conscious  Pain score: 0  Pain management: adequate  Airway patency: patent  Anesthetic complications: No anesthetic complications    Cardiovascular status: acceptable  Respiratory status: acceptable  Hydration status: acceptable    Comments: BP 96/67   Pulse 82   Temp 37 °C (98.6 °F) (Oral)   Resp 16   Ht 172.7 cm (68\")   Wt 69.9 kg (154 lb 1.6 oz)   SpO2 100%   BMI 23.43 kg/m²         "

## 2018-03-16 NOTE — OP NOTE
Operative  Note    Patient: Keila Caldwell  YOB: 1960    Medical Record Number: [id number]    Attending Physician: Sergio Balderas, *    Date of Service: 3/16/2018     Pre-op Diagnosis:   Osteoarthritis of left hip    Post-Op Diagnosis Codes:     * Osteoarthritis of left hip [M16.12]    PROCEDURE PERFORMED:  LT TOTAL HIP ARTHROPLASTY ANTERIOR WITH HANA TABLE by utilizing a Direct anterior approach with  Depuy [place] Gription Acetabular  Shell Sector with  holes size 48 mm outer diameter and a neutral ALTRX  Polyethylene acetabular  liner- 32 mm internal diameter, and  Actis Duo fix Cementless Standard Collar femoral stem size # 4 with a Delta ceramic femoral head- 3 mm outer diameter, + 5 neck length by utilizing a [place] table ([place]).     SURGEON: Sergio Balderas MD     ASSISTANT:   Davy Bolanos MD, Fellow  SANCHEZ Ferguson  The services of a first assist were necessary for performing the procedure safely and expeditiously.  The first assist was present for the entire duration of the case and helped with positioning, retraction and closure of the incision.     ANESTHESIA:  General  Anesthesiologist: Lisa Crum MD     Estimated Blood Loss: 200 mL    Specimens: * No orders in the log *    COMPLICATIONS: Nil.     DRAINS:      INDICATIONS: The patient is a 58 y.o. female who presented to my office with complaints of progressively worsening pain in the hip.  The patient has reached a point of disability secondary to the severe pain and immobility. The patient had difficulty with activities of daily living.  The patient has failed nonoperative management.  She had intraarticular injection to the left hip which gave her excellent relief of symptoms. Her x-ray findings were minimal and MRI did not relieve any fluid. She has a history of kidney disease and cannot use NSAID's.    The medical history was reviewed. The patient was indicated for a  total hip arthroplasty. Likely  risks and benefits of the procedure including, but not limited to infection, DVT, pulmonary embolism, leg length discrepancy, recurrent dislocation, possibility of injury to nerves or vessels, and periprosthetic fractures have been discussed in detail. Despite the risks involved, the patient elected to proceed and informed consent was obtained. The patient was seen in the preoperative holding area, and the  operative site was marked.     DESCRIPTION OF PROCEDURE: The patient has been transferred to [facility] Operating Room.   Preoperative antibiotics in the form of  Kefzol was given intravenously prior to the incision.   After achieving adequate general anesthesia, the patient was transferred onto the [place] table. All bony prominences were padded adequately.   The operative hip was prepped and draped in the usual sterile fashion.   Surgical timeout was done. Correct patient, surgical side and site were identified.  Tranexamic acid was given intravenously at the time of skin incision.    A skin incision was made overlying the anterolateral aspect of the  hip for about 10 cm from just below and lateral to the anterior/superior iliac spine. Skin and subcutaneous tissue were incised and the deep fascia was incised in line with the skin incision overlying the tensor fascia josue muscle. The tensor muscle was retracted laterally and interval between the sartorius and the rectus femoris medially and the tensor fascia josue muscle was developed. The lateral circumflex femoral vessels were identified. They were clamped, cut, and ligated. Unnamed deep fascia was incised. Capsule of the hip joint was identified. Retractors were placed extracapsularly. There was a large effusion and a thickened capsule. The capsule was incised in a L-shaped manner and the anterior capsule was tagged with FiberWire. Followed by this, the retractors were placed intracapsularly. Appropriate capsular releases were done along the  inferior and  medial neck and along the saddle of the femoral neck.  The femoral neck was identified. The femoral neck osteotomy was done according to the preoperative templating , utilizing an oscillating saw. Femoral head was extracted using a corkscrew without any difficulty. The femoral head was relatively well preserved except for some peripheral impingement signs in the superior aspect.   The acetabular cavity was inspected and exposed appropriately by placing retractors taking care to protect the anterior neurovascular structures. The superior labrum was found to be detached from the bony attachment. The labrum was excised, pulvinar was excised from the cotyloid fossa. Acetabular cavity was progressively reamed, maintaining the correct inclination and version under image intensifier control. Adequate medialization was obtained. Adequate fit was found to be obtained at reamer size 47 .  The  [place] Gription  Acetabular Shell Sector with  holes  48 mm was seated into position. It was found to be seated satisfactorily with adequate inclination and anteversion. There was good stability.  Followed by this, a polyethylene liner was seated into position, checked for stability. This was a 32 mm internal diameter,  highly cross linked neutral 0° lip liner.     The periarticular tissue was infiltrated with local anaesthetic injection which consisted of Naropin, clonidine mixture.     Attention was then directed to the proximal femur. The proximal femur was delivered by utilizing a trochanteric hook placed just distal to the vastus tubercle and proximal to the gluteus paris tendon. The leg was then externally rotated with the gross traction released and  hyperextension, adduction was done using the [place] table spar. The mechanical lift on the table was utilized to support the femur.  Appropriate capsular releases were made to expose the medial slope of the greater trochanter and the proximal femur was delivered. The  femoral canal was entered by removing the lateral femoral neck with a box chisel followed by serial broaching. Adequate fit was found to be obtained with a size 4 broach. A trial reduction was performed. Leg lengths and offset were found to be satisfactory under image intensifier on comparison with the opposite hip under image intensifier. Reduction was found to be satisfactory and there was good stability with range of motion of the hip in internal and external rotation. Having been satisfied with the trials, the hip joint was dislocated.     The femoral  trial broach was removed and  Actis Duo fix Cementless  Standard Collar femoral stem size # 4 was seated into position. This was found to be satisfactorily seated with good stability.      The delta ceramic femoral head 32 mm +5 mm neck length was seated onto the trunnion and impacted. Followed by this, the hip joint was reduced. Reduction was found to be satisfactory and image confirmed leg length, offset , implant position and stem fill of the femoral canal.  There were no iatrogenic fractures. Hip joint was thoroughly irrigated with saline. Soft tissue hemostasis was secured. The sponge count and needle count was correct. The FiberWire sutures was tagged to the anterior capsular flaps and they were tied to each other. The incision was closed in layers with vicryl and monocryl sutures and the patient was transferred to the recovery room in a stable condition.     The patient tolerated the procedure well. There were no complications. The patient had adequate distal pulses and good capillary refill.     The patient will be mobilized with physical therapy.   Antibiotic prophylaxis  in the form of Kefzol postoperatively two more doses will be given in the first 24 hours.   The  patient will be started on DVT prophylaxis with  Aspirin 325 mg twice daily starting on postoperative day #1.    I discussed the satisfactory performance of the procedure with the patient's  family and discussed with them the postoperative management.    Sergio Balderas MD     Date: 3/16/2018  Time: 1:01 PM    cc: Nito Cervantes MD; MD Sergio Saha, *

## 2018-03-16 NOTE — PLAN OF CARE
Problem: Patient Care Overview  Goal: Plan of Care Review   03/16/18 1706   OTHER   Outcome Summary patient presents with generalized post op weakness and pain, decreased activity tolerance which limit independence with functional mobility and patient would benefit from skilled PT. Patient needs DME, anticipate d/c home tomorrow with HHPT.

## 2018-03-16 NOTE — THERAPY EVALUATION
Acute Care - Physical Therapy Initial Evaluation  [facility]     Patient Name: Keila Caldwell  : 1960  MRN: [id number]  Today's Date: 3/16/2018   Onset of Illness/Injury or Date of Surgery: 18     Referring Physician: Andrey      Admit Date: 3/16/2018    Visit Dx:     ICD-10-CM ICD-9-CM   1. Decreased mobility R26.89 781.99     Patient Active Problem List   Diagnosis   • Hypothyroidism   • Chronic nausea   • LBP (low back pain)   • History of thyroidectomy   • Hypocalcemia   • Hypoparathyroidism   • Intractable chronic migraine without aura   • Postmenopausal status   • Primary osteoarthritis of left hip   • Preoperative clearance   • Osteoarthritis of one hip     Past Medical History:   Diagnosis Date   • Arthritis    • Hypothyroidism    • Left hip pain    • Migraines     ON MED   • UTI (urinary tract infection)     CURRENTLY FINISHING AB FOR A UTI FOUND COUPLE WEEKS AGO     Past Surgical History:   Procedure Laterality Date   • COLONOSCOPY     • FOOT SURGERY Right     CYST   • HYSTERECTOMY     • TONSILLECTOMY          PT ASSESSMENT (last 72 hours)      Physical Therapy Evaluation     Row Name 18 1654          PT Evaluation Time/Intention    Subjective Information complains of;pain  -EM     Document Type evaluation  -EM     Mode of Treatment physical therapy  -EM     Row Name 18 1654          General Information    Onset of Illness/Injury or Date of Surgery 18  -EM     Referring Physician Andrey  -EM     Patient Observations alert;cooperative;agree to therapy  -EM     General Observations of Patient  female in supine, awake and alert, spouse at bedside   -EM     Prior Level of Function independent:;community mobility  -EM     Equipment Currently Used at Home none  -EM     Pertinent History of Current Functional Problem L LANETTE (anterior approach)   -EM     Existing Precautions/Restrictions hip, anterior  -EM     Row Name 18 1656          Relationship/Environment     Lives With spouse  -EM     Row Name 03/16/18 1654          Resource/Environmental Concerns    Current Living Arrangements home/apartment/condo  -EM     Row Name 03/16/18 1654          Home Main Entrance    Number of Stairs, Main Entrance three  -EM     Stair Railings, Main Entrance none  -EM     Row Name 03/16/18 1654          Stairs Within Home, Primary    Stairs, Within Home, Primary 13  -EM     Row Name 03/16/18 1654          Cognitive Assessment/Intervention- PT/OT    Orientation Status (Cognition) oriented x 4  -EM     Follows Commands (Cognition) WNL  -EM     Row Name 03/16/18 1654          Mobility Assessment/Treatment    Extremity Weight-bearing Status left lower extremity  -EM     Left Lower Extremity (Weight-bearing Status) weight-bearing as tolerated (WBAT)  -EM     Row Name 03/16/18 1654          Bed Mobility Assessment/Treatment    Bed Mobility Assessment/Treatment supine-sit  -EM     Supine-Sit [place] (Bed Mobility) supervision  -EM     Assistive Device (Bed Mobility) head of bed elevated;bed rails  -EM     Row Name 03/16/18 1654          Transfer Assessment/Treatment    Transfer Assessment/Treatment sit-stand transfer;stand-sit transfer  -EM     Sit-Stand [place] (Transfers) contact guard;verbal cues  -EM     Stand-Sit [place] (Transfers) contact guard  -EM     Row Name 03/16/18 1654          Sit-Stand Transfer    Assistive Device (Sit-Stand Transfers) walker, front-wheeled  -EM     Row Name 03/16/18 1654          Stand-Sit Transfer    Assistive Device (Stand-Sit Transfers) walker, front-wheeled  -EM     Row Name 03/16/18 1654          Gait/Stairs Assessment/Training    [place] Level (Gait) contact guard  -EM     Assistive Device (Gait) walker, front-wheeled  -EM     Distance in Feet (Gait) 30  -EM     Deviations/Abnormal Patterns (Gait) left sided deviations;stacey decreased  -EM     Left Sided Gait Deviations --   antalgic on L   -EM     Row Name 03/16/18 1654           General ROM    GENERAL ROM COMMENTS ROM WNL  -EM     Row Name 03/16/18 1654          General Assessment (Manual Muscle Testing)    General Manual Muscle Testing (MMT) Assessment no strength deficits identified   post op weakness LLE, not formally assessed   -EM     Row Name             Wound 03/16/18 1208 hip incision    Wound - Properties Group Date first assessed: 03/16/18  -SS Time first assessed: 1208  -SS Location: hip  -SS Type: incision  -SS    Row Name 03/16/18 1654          Physical Therapy Clinical Impression    Patient/Family Goals Statement (PT Clinical Impression) go home  -EM     Criteria for Skilled Interventions Met (PT Clinical Impression) yes;treatment indicated  -EM     Impairments Found (describe specific impairments) gait, locomotion, and balance  -EM     Rehab Potential (PT Clinical Summary) good, to achieve stated therapy goals  -EM     Row Name 03/16/18 1654          Physical Therapy Goals    Bed Mobility Goal Selection (PT) bed mobility, PT goal 1  -EM     Transfer Goal Selection (PT) transfer, PT goal 1  -EM     Gait Training Goal Selection (PT) gait training, PT goal 1  -EM     Stairs Goal Selection (PT) stairs, PT goal 1  -EM     Additional Documentation Stairs Goal Selection (PT) (Row)  -EM     Row Name 03/16/18 1651          Bed Mobility Goal 1 (PT)    Activity/Assistive Device (Bed Mobility Goal 1, PT) bed mobility activities, all  -EM     [place] Level/Cues Needed (Bed Mobility Goal 1, PT) independent  -EM     Time Frame (Bed Mobility Goal 1, PT) 2 days  -EM     Row Name 03/16/18 1651          Transfer Goal 1 (PT)    Activity/Assistive Device (Transfer Goal 1, PT) transfers, all  -EM     [place] Level/Cues Needed (Transfer Goal 1, PT) independent  -EM     Time Frame (Transfer Goal 1, PT) 2 days  -EM     Row Name 03/16/18 1651          Gait Training Goal 1 (PT)    Activity/Assistive Device (Gait Training Goal 1, PT) walker, rolling  -EM     [place] Level (Gait Training  Goal 1, PT) conditional independence  -EM     Distance (Gait Goal 1, PT) 150  -EM     Time Frame (Gait Training Goal 1, PT) 2 days  -EM     Row Name 03/16/18 1654          Stairs Goal 1 (PT)    Activity/Assistive Device (Stairs Goal 1, PT) stairs, all skills  -EM     [place] Level/Cues Needed (Stairs Goal 1, PT) contact guard assist  -EM     Number of Stairs (Stairs Goal 1, PT) 3  -EM     Time Frame (Stairs Goal 1, PT) 2 days  -EM     Row Name 03/16/18 1654          Patient Education Goal (PT)    [place]/Cues/Accuracy (Memory Goal 2, PT) demonstrates adequately;verbalizes understanding  -EM     Time Frame (Patient Education Goal, PT) 2 days  -EM     Row Name 03/16/18 1654          Positioning and Restraints    Pre-Treatment Position in bed  -EM     Post Treatment Position chair  -EM     In Chair reclined;call light within reach;with family/caregiver  -EM     Row Name 03/16/18 1654          Living Environment    Home Accessibility stairs to enter home;stairs within home  -EM       User Key  (r) = Recorded By, (t) = Taken By, (c) = Cosigned By    Initials Name Provider Type     Shannon Spurling, RN Registered Nurse    EM Gabriela Booker PT Physical Therapist          Physical Therapy Education     Title: PT OT SLP Therapies (Active)     Topic: Physical Therapy (Active)     Point: Mobility training (Active)    Learning Progress Summary     Learner Status Readiness Method Response Comment Documented by    Patient Active Acceptance E NR  EM 03/16/18 1704          Point: Home exercise program (Active)    Learning Progress Summary     Learner Status Readiness Method Response Comment Documented by    Patient Active Acceptance E NR  EM 03/16/18 1704                      User Key     Initials Effective Dates Name Provider Type Discipline    EM 12/01/15 -  Gabriela Booker PT Physical Therapist PT                PT Recommendation and Plan  Anticipated Discharge Disposition (PT): home with home health  care  Planned Therapy Interventions (PT Eval): bed mobility training, gait training, home exercise program  Therapy Frequency (PT Clinical Impression): 2 times/day  Outcome Summary/Treatment Plan (PT)  Anticipated Equipment Needs at Discharge (PT): bedside commode, front wheeled walker  Anticipated Discharge Disposition (PT): home with home health care  Outcome Summary: patient presents with generalized post op weakness and pain, decreased activity tolerance which limit independence with functional mobility and patient would benefit from skilled PT. Patient needs DME, anticipate d/c home tomorrow with HHPT.           Outcome Measures     Row Name 03/16/18 1700             How much help from another person do you currently need...    Turning from your back to your side while in flat bed without using bedrails? 4  -EM      Moving from lying on back to sitting on the side of a flat bed without bedrails? 3  -EM      Moving to and from a bed to a chair (including a wheelchair)? 3  -EM      Standing up from a chair using your arms (e.g., wheelchair, bedside chair)? 3  -EM      Climbing 3-5 steps with a railing? 3  -EM      To walk in hospital room? 3  -EM      AM-PAC 6 Clicks Score 19  -EM         Functional Assessment    Outcome Measure Options AM-PAC 6 Clicks Basic Mobility (PT)  -EM        User Key  (r) = Recorded By, (t) = Taken By, (c) = Cosigned By    Initials Name Provider Type    LUCIANO Booker PT Physical Therapist           Time Calculation:         PT Charges     Row Name 03/16/18 1707             Time Calculation    Start Time 1630  -EM      Stop Time 1652  -EM      Time Calculation (min) 22 min  -EM      PT Received On 03/16/18  -EM      PT - Next Appointment 03/17/18  -EM      PT Goal Re-Cert Due Date 03/18/18  -EM        User Key  (r) = Recorded By, (t) = Taken By, (c) = Cosigned By    Initials Name Provider Type    LUCIANO Booker PT Physical Therapist          Therapy Charges for Today      Code Description Service Date Service Provider Modifiers Qty    21027795960 HC PT EVAL LOW COMPLEXITY 2 3/16/2018 Gabriela Booker, PT GP 1    96101552297 HC PT THER PROC EA 15 MIN 3/16/2018 Gabriela Booker, PT GP 1          PT G-Codes  Outcome Measure Options: AM-PAC 6 Clicks Basic Mobility (PT)      Gabriela Booker, PT  3/16/2018

## 2018-03-16 NOTE — H&P
VALERIE is a 57 year old female who is here today for follow up of  pain in the left hip which has been present for 2 years.  She has had a diagnostic left hip injection and she had excellent relief of symptoms for 1 day.  However, the cortisone has not helped her.  She is here in the office discussed regarding further options and for scheduling her left hip surgery.   She has been seeing her rheumatologist, Dr. Patrick Coleman, and his nurse practitioner.  The pain is gradually becoming worse.  She had tried physical therapy which did not help.  She has been trying acupuncture helps temporarily.  She had a cortisone injection to the left trochanteric area, which did not help much.  The patient denies an injury.  The patient is not on pain meds.  The patient states that she is having a sharp and dull pain which she rates at a 8 on a scale from 1-10.  The pain occurs intermittently.  The pain is located in the front of the hip.  The pain occurs on the left side only.  The pain is radiating from the hip to the knee.  The patient states that activity, standing and walking makes it worse.  She has tried physical therapy and cortisone injections but this has not helped.  Cannot use NSAID's due to kidney problems. Cannot walk distances, cannot sit at work, good days and bad days.   She has had extensive evaluation for her left knee and lower lumbar spine with Dr. Karl Schaefer.  The x-rays and MRIs were essentially normal.  She continued to have difficulty, and has presented for an MRI of the left hip which was done and the patient comes in today to the office for further evaluation and management of the left hip pain.  She cannot use NSAIDs secondary to her kidney disease.  Denies any history of MRSA, DVT.  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  Review of Systems:  Positive for: Joint Pain.     Patient denies: Abdominal Pain, Bleeding, Chest Pain, Convulsions/Seizure, Decreased Motion,  Depression, Difficulty Swallowing, Easy Bruisability, Emotional Disturbances, Eyes or Vision Problems, Fecal Incontinence, Fever/Chills, Headaches, Increased Thirst, Increased Hunger, Insomnia, Nausea/Vomiting, Night Sweats, Poor Balance, Persistent Cough, Rash, Shortness of Breath, Shortness of Breath While Lying down, Skin Problems, Urinary Retention and Weakness.  Allergies:  * no known allergies  Medications:  * botox injection every 3 months  hydroxychloroquine sulfate 200 mg oral tablet (hydroxychloroquine sulfate) bid  * calcium 300mg qd  topamax tablet (topiramate tabs) qd  calcitriol capsule (calcitriol caps) qd  levoxyl tablet (levothyroxine sodium tabs)   Patient History of:  BLOOD CLOTS/EMBOLISM - NEGATIVE  KIDNEY DISEASE  Surgical History:  Tooth Extraction-[CPT-45601]   Tonsillectomy*-[CPT-21634]   Hysterectomy-Partial-[CPT-65030]   No Previous Orthopaedic Surgery-history   Known Family History of:  cancer-father  Social History:  Social history taken on 01/24/2018 states VALERIE PARNELL is a  58 year old female.  She has never used tobacco products.       Past medical, social, family histories and ROS reviewed today with the patient and changes documented in the chart (01/24/2018).  PCP Dr. SARAH BETH CARRANZA, GENI DOAN     Physical Exam  Height:  68 in.    Weight:  155 lbs.     BMI:  23.65  Pulse:  88  Blood pressure:  120 / 66 mm Hg     Gait: antalgic               Ambulation: patient ambulates without assistive devices        Mental/HEENT/Cardio/Skin  The patient's general appearance was well nourished, well hydrated, no acute distress.  Orientation was alert and oritented x 3.  The patient's mood was normal.  A head exam revealed normocephalic/atraumatic.  An eye exam revealed pupils equal.  Pulmonary exam shows normal air exchange, no labored breathing, or shortness of breath.  A skin exam showed normal temperature and color in the area of examination.       Left Hip/Pelvis  Normal:  Neurovascular  status is intact.  Sensation in hip is normal.  DP Pulse is 3+.  PT PULSE is 3+.  Capillary Refill is normal.  Reflexes are normal.    ROM  Internal Rotation: <30  External Rotation: <40  Abduction: <45  Adduction: <15  Flexion: <100  Extension: <5     Tenderness  Location: groin  Radiation of Pain: anterior thigh  Pain w/ Palpation: none  David Test: negative  Impingement: negative  Straight Leg Raise: negative     Strength  Quad: normal  Abduction: normal  Adduction: normal  Flexion: normal  Extension: normal     Positive Stinchfield sign  Positive Trendelenburg sign     Imaging/Diagnostic Studies     X-rays of the left hip/pelvisX-rays show osteoarthritis is moderate.    Left Hip/Pelvis MRI: 12/07/2017    MRI shows Minimal hip joint effusion.       Impression  Left hip primary osteoarthritis (LVX04-Y01.12)  Left hip osteoarthritis resulting from hip dysplasia (YHQ31-R02.32)     Plan  Options and alternatives were discussed in detail with the patient.  The patient has reached the point of disability and failed nonoperative management.  The patient is indicated for a left  total hip replacement . Options and alternatives have been discussed in detail with the patient .  The likely Risks and benefits of the procedure including but not limited to infection, DVT, pulmonary embolism, leg length discrepancy, recurrent dislocation, periprosthetic fractures, possibility of injury to nerves or vessels have been discussed in detail.   Despite the risks involved the patient would like to proceed.  Surgery is being scheduled for a  left total hip arthroplasty by direct anterior approach at [facility] on March 16, 2018.  Plan for aspirin for DVT prophylaxis.  Home with home health.  We will try to avoid Toradol and meloxicam.  I will request for medical and cardiac clearance for her from her family physician, Dr. Helen MD .  She had a UTI and is being treated for this.     We discussed the benefits of surgical  intervention, as well as alternative treatments.  Potential surgical risks and complications include but are not limited to DVT, infection, neurovascular injury, fracture, implant wear, failure, possible need for revision surgery, loss of motion, dislocation, limb length changes.  Sufficient opportunity was given to discuss the condition and treatment plan with the doctor, and all questions were answered for the patient.  Nonsurgical measures such as injections, medications, or physical therapy may not offer significant relief to this patient.  The discussion lasted 30 minutes.       Keila should follow up with KAIN REZA MD post op.

## 2018-03-16 NOTE — ANESTHESIA PREPROCEDURE EVALUATION
Anesthesia Evaluation     Patient summary reviewed and Nursing notes reviewed   no history of anesthetic complications:  NPO Solid Status: > 8 hours  NPO Liquid Status: > 4 hours           Airway   Mallampati: II  Dental      Pulmonary - negative pulmonary ROS and normal exam   Cardiovascular - negative cardio ROS and normal exam        Neuro/Psych  (+) headaches,     GI/Hepatic/Renal/Endo    (+)   hypothyroidism,     Musculoskeletal     Abdominal    Substance History      OB/GYN          Other                        Anesthesia Plan    ASA 2     general     intravenous induction   Anesthetic plan and risks discussed with patient.

## 2018-03-17 VITALS
TEMPERATURE: 97.6 F | HEART RATE: 88 BPM | HEIGHT: 68 IN | OXYGEN SATURATION: 96 % | WEIGHT: 154.1 LBS | SYSTOLIC BLOOD PRESSURE: 87 MMHG | RESPIRATION RATE: 16 BRPM | BODY MASS INDEX: 23.36 KG/M2 | DIASTOLIC BLOOD PRESSURE: 56 MMHG

## 2018-03-17 PROBLEM — M16.12 PRIMARY OSTEOARTHRITIS OF LEFT HIP: Chronic | Status: RESOLVED | Noted: 2018-01-28 | Resolved: 2018-03-17

## 2018-03-17 PROBLEM — M16.10 OSTEOARTHRITIS OF ONE HIP: Status: RESOLVED | Noted: 2018-03-16 | Resolved: 2018-03-17

## 2018-03-17 PROBLEM — I95.81 POSTOPERATIVE HYPOTENSION: Status: ACTIVE | Noted: 2018-03-17

## 2018-03-17 PROBLEM — I95.81 POSTOPERATIVE HYPOTENSION: Status: RESOLVED | Noted: 2018-03-17 | Resolved: 2018-03-17

## 2018-03-17 LAB
ANION GAP SERPL CALCULATED.3IONS-SCNC: 13 MMOL/L
BASOPHILS # BLD AUTO: 0.02 10*3/MM3 (ref 0–0.2)
BASOPHILS NFR BLD AUTO: 0.2 % (ref 0–1.5)
BUN BLD-MCNC: 19 MG/DL (ref 6–20)
BUN/CREAT SERPL: 20.2 (ref 7–25)
CALCIUM SPEC-SCNC: 7.1 MG/DL (ref 8.6–10.5)
CHLORIDE SERPL-SCNC: 102 MMOL/L (ref 98–107)
CO2 SERPL-SCNC: 23 MMOL/L (ref 22–29)
CREAT BLD-MCNC: 0.94 MG/DL (ref 0.57–1)
DEPRECATED RDW RBC AUTO: 46.9 FL (ref 37–54)
EOSINOPHIL # BLD AUTO: 0 10*3/MM3 (ref 0–0.7)
EOSINOPHIL NFR BLD AUTO: 0 % (ref 0.3–6.2)
ERYTHROCYTE [DISTWIDTH] IN BLOOD BY AUTOMATED COUNT: 13.4 % (ref 11.7–13)
GFR SERPL CREATININE-BSD FRML MDRD: 62 ML/MIN/1.73
GFR SERPL CREATININE-BSD FRML MDRD: 75 ML/MIN/1.73
GLUCOSE BLD-MCNC: 109 MG/DL (ref 65–99)
HCT VFR BLD AUTO: 30.2 % (ref 35.6–45.5)
HGB BLD-MCNC: 9.7 G/DL (ref 11.9–15.5)
IMM GRANULOCYTES # BLD: 0.03 10*3/MM3 (ref 0–0.03)
IMM GRANULOCYTES NFR BLD: 0.3 % (ref 0–0.5)
LYMPHOCYTES # BLD AUTO: 1.13 10*3/MM3 (ref 0.9–4.8)
LYMPHOCYTES NFR BLD AUTO: 11.1 % (ref 19.6–45.3)
MCH RBC QN AUTO: 30.6 PG (ref 26.9–32)
MCHC RBC AUTO-ENTMCNC: 32.1 G/DL (ref 32.4–36.3)
MCV RBC AUTO: 95.3 FL (ref 80.5–98.2)
MONOCYTES # BLD AUTO: 0.81 10*3/MM3 (ref 0.2–1.2)
MONOCYTES NFR BLD AUTO: 7.9 % (ref 5–12)
NEUTROPHILS # BLD AUTO: 8.23 10*3/MM3 (ref 1.9–8.1)
NEUTROPHILS NFR BLD AUTO: 80.5 % (ref 42.7–76)
PLATELET # BLD AUTO: 190 10*3/MM3 (ref 140–500)
PMV BLD AUTO: 10.3 FL (ref 6–12)
POTASSIUM BLD-SCNC: 4 MMOL/L (ref 3.5–5.2)
RBC # BLD AUTO: 3.17 10*6/MM3 (ref 3.9–5.2)
SODIUM BLD-SCNC: 138 MMOL/L (ref 136–145)
WBC NRBC COR # BLD: 10.22 10*3/MM3 (ref 4.5–10.7)

## 2018-03-17 PROCEDURE — 85025 COMPLETE CBC W/AUTO DIFF WBC: CPT | Performed by: ORTHOPAEDIC SURGERY

## 2018-03-17 PROCEDURE — 80048 BASIC METABOLIC PNL TOTAL CA: CPT | Performed by: ORTHOPAEDIC SURGERY

## 2018-03-17 PROCEDURE — 97110 THERAPEUTIC EXERCISES: CPT

## 2018-03-17 PROCEDURE — 97150 GROUP THERAPEUTIC PROCEDURES: CPT

## 2018-03-17 PROCEDURE — 25010000003 CEFAZOLIN IN DEXTROSE 2-4 GM/100ML-% SOLUTION: Performed by: ORTHOPAEDIC SURGERY

## 2018-03-17 PROCEDURE — G8980 MOBILITY D/C STATUS: HCPCS

## 2018-03-17 RX ORDER — HYDROCODONE BITARTRATE AND ACETAMINOPHEN 7.5; 325 MG/1; MG/1
1 TABLET ORAL EVERY 4 HOURS PRN
Qty: 60 TABLET | Refills: 0 | Status: SHIPPED | OUTPATIENT
Start: 2018-03-17 | End: 2018-03-27

## 2018-03-17 RX ADMIN — CEFAZOLIN SODIUM 2 G: 2 INJECTION, SOLUTION INTRAVENOUS at 04:50

## 2018-03-17 RX ADMIN — HYDROCODONE BITARTRATE AND ACETAMINOPHEN 2 TABLET: 7.5; 325 TABLET ORAL at 07:26

## 2018-03-17 RX ADMIN — LEVOTHYROXINE SODIUM 100 MCG: 100 TABLET ORAL at 05:25

## 2018-03-17 RX ADMIN — HYDROCODONE BITARTRATE AND ACETAMINOPHEN 2 TABLET: 7.5; 325 TABLET ORAL at 02:35

## 2018-03-17 RX ADMIN — SODIUM CHLORIDE 1000 ML: 9 INJECTION, SOLUTION INTRAVENOUS at 04:09

## 2018-03-17 RX ADMIN — HYDROCODONE BITARTRATE AND ACETAMINOPHEN 2 TABLET: 7.5; 325 TABLET ORAL at 12:27

## 2018-03-17 RX ADMIN — SODIUM CHLORIDE 100 ML/HR: 9 INJECTION, SOLUTION INTRAVENOUS at 01:00

## 2018-03-17 RX ADMIN — SODIUM CHLORIDE 500 ML: 9 INJECTION, SOLUTION INTRAVENOUS at 12:30

## 2018-03-17 NOTE — DISCHARGE SUMMARY
Orthopedic Discharge Summary      Patient: Keila Caldwell    YOB: 1960    Medical Record Number: [id number]    Attending Physician: Sergio Balderas, *  Consulting Physician(s):   Date of Admission: 3/16/2018  9:05 AM  Date of Discharge:     Admitting Diagnosis: Osteoarthritis of one hip [M16.10]    Procedures Performed  Procedure(s):  LT TOTAL HIP ARTHROPLASTY ANTERIOR WITH HANA TABLE       LT TOTAL HIP ARTHROPLASTY ANTERIOR WITH HANA TABLE    Patient Active Problem List   Diagnosis   • Hypothyroidism   • Chronic nausea   • LBP (low back pain)   • History of thyroidectomy   • Hypocalcemia   • Hypoparathyroidism   • Intractable chronic migraine without aura   • Postmenopausal status   • Primary osteoarthritis of left hip   • Preoperative clearance   • Osteoarthritis of one hip          Allergies   Allergen Reactions   • Naproxen Other (See Comments)     Kidney dysfunction   • Nsaids Other (See Comments)     KIDNEY DYSFUNCTION        Keila Caldwell   Home Medication Instructions LOLA:[id number]    Printed on:03/17/18 1249   Medication Information                      acetaminophen (TYLENOL) 500 MG tablet  Take 500 mg by mouth Every 6 (Six) Hours As Needed for Mild Pain .             aspirin 325 MG EC tablet  Take 1 tablet by mouth Daily for 21 days.             calcitriol (ROCALTROL) 0.5 MCG capsule  Take 0.5 mcg by mouth Every Morning.             CALCIUM PO  Take 3,000 mg by mouth Every Morning. CHEWABLE TABLETS             cephalexin (KEFLEX) 500 MG capsule  Take 1 capsule by mouth 3 (Three) Times a Day.             ciprofloxacin (CIPRO) 500 MG tablet  Take 500 mg by mouth 2 (Two) Times a Day.             conjugated estrogens (PREMARIN) 0.625 MG/GM vaginal cream  Insert  into the vagina Daily.             estradiol (ESTRACE) 0.1 MG/GM vaginal cream  Insert 1 applicator into the vagina Every Night.             HYDROcodone-acetaminophen (NORCO) 7.5-325 MG per tablet  Take 1 tablet by mouth  Every 4 (Four) Hours As Needed for Moderate Pain  for up to 10 days.             hydroxychloroquine (PLAQUENIL) 200 MG tablet  Take 200 mg by mouth 2 (Two) Times a Day. TAKE 1 TABLET TWICE A DAY             levothyroxine (SYNTHROID, LEVOTHROID) 100 MCG tablet  Take 100 mcg by mouth Daily.             rizatriptan (MAXALT) 10 MG tablet  Take 10 mg by mouth As Needed. TAKE 1 TABLET BY MOUTH ONCE AS NEEDED FOR MIGRAINE FOR UP TO 1 DOSE MAY REPEAT IN 2 HOURS IF NEEDED             sulfamethoxazole-trimethoprim (BACTRIM DS,SEPTRA DS) 800-160 MG per tablet  Take 1 tablet by mouth 2 (Two) Times a Day.             topiramate (TOPAMAX) 50 MG tablet  Take 50 mg by mouth Every Night.                      Past Medical History:   Diagnosis Date   • Arthritis    • Hypothyroidism    • Left hip pain    • Migraines     ON MED   • UTI (urinary tract infection)     CURRENTLY FINISHING AB FOR A UTI FOUND COUPLE WEEKS AGO        Past Surgical History:   Procedure Laterality Date   • COLONOSCOPY     • FOOT SURGERY Right     CYST   • HYSTERECTOMY     • TONSILLECTOMY          Social History     Occupational History   • Not on file.     Social History Main Topics   • Smoking status: Never Smoker   • Smokeless tobacco: Never Used   • Alcohol use Yes      Comment: social   • Drug use: No   • Sexual activity: Not on file      Social History     Social History Narrative   • No narrative on file        Family History   Problem Relation Age of Onset   • No Known Problems Mother    • No Known Problems Father    • No Known Problems Sister    • No Known Problems Brother    • No Known Problems Son    • No Known Problems Daughter    • No Known Problems Maternal Grandmother    • No Known Problems Maternal Grandfather    • No Known Problems Paternal Grandmother    • No Known Problems Paternal Grandfather    • No Known Problems Cousin    • Malig Hyperthermia Neg Hx        Physical Exam: 58 y.o. female  General Appearance:    Alert, cooperative, in no acute  distress                      Vitals:    03/16/18 1600 03/16/18 1715 03/16/18 1815 03/16/18 1900   BP: 105/66 95/59 90/61 91/60   BP Location:    Left arm   Patient Position:    Lying   Pulse: 79 89 97 92   Resp: 17 16 17 16   Temp:    97.7 °F (36.5 °C)   TempSrc:    Oral   SpO2: 100% 96% 97% 98%   Weight:       Height:            Hospital Course:  58 y.o. female admitted to [facility] to services of Sergio Balderas, Leila with Osteoarthritis of one hip [M16.10] on 3/16/2018 and underwent a left total hip arthroplasty Per Davy Bolanos MD. Antibiotic  Kefzol  every 8 hours and VTE prophylaxis  Aspirin 325 mg every 24 hours orally  were per [facility] protocols. Post-operatively the patient transferred to the post-operative floor where the patient underwent mobilization therapy that included active ROM exercises. Opioids were titrated to achieve appropriate pain management to allow for participation in mobilization exercises. Vital signs are now stable. The incision is intact without signs or symptoms of infection. Operative extremity neurovascular status remains intact.   Appropriate education re: incision care, activity levels, medications, hip dislocation precautions, and follow up visits was completed and all questions were answered. The patient is now deemed stable for discharge.    DIAGNOSTIC TESTS:     Admission on 03/16/2018   Component Date Value Ref Range Status   • Glucose 03/17/2018 109* 65 - 99 mg/dL Final   • BUN 03/17/2018 19  6 - 20 mg/dL Final   • Creatinine 03/17/2018 0.94  0.57 - 1.00 mg/dL Final   • Sodium 03/17/2018 138  136 - 145 mmol/L Final   • Potassium 03/17/2018 4.0  3.5 - 5.2 mmol/L Final   • Chloride 03/17/2018 102  98 - 107 mmol/L Final   • CO2 03/17/2018 23.0  22.0 - 29.0 mmol/L Final   • Calcium 03/17/2018 7.1* 8.6 - 10.5 mg/dL Final   • eGFR   Amer 03/17/2018 75  >60 mL/min/1.73 Final   • eGFR Non African Amer 03/17/2018 62  >60 mL/min/1.73 Final   • BUN/Creatinine Ratio  03/17/2018 20.2  7.0 - 25.0 Final   • Anion Gap 03/17/2018 13.0  mmol/L Final   • WBC 03/17/2018 10.22  4.50 - 10.70 10*3/mm3 Final   • RBC 03/17/2018 3.17* 3.90 - 5.20 10*6/mm3 Final   • Hemoglobin 03/17/2018 9.7* 11.9 - 15.5 g/dL Final   • Hematocrit 03/17/2018 30.2* 35.6 - 45.5 % Final   • MCV 03/17/2018 95.3  80.5 - 98.2 fL Final   • MCH 03/17/2018 30.6  26.9 - 32.0 pg Final   • MCHC 03/17/2018 32.1* 32.4 - 36.3 g/dL Final   • RDW 03/17/2018 13.4* 11.7 - 13.0 % Final   • RDW-SD 03/17/2018 46.9  37.0 - 54.0 fl Final   • MPV 03/17/2018 10.3  6.0 - 12.0 fL Final   • Platelets 03/17/2018 190  140 - 500 10*3/mm3 Final   • Neutrophil % 03/17/2018 80.5* 42.7 - 76.0 % Final   • Lymphocyte % 03/17/2018 11.1* 19.6 - 45.3 % Final   • Monocyte % 03/17/2018 7.9  5.0 - 12.0 % Final   • Eosinophil % 03/17/2018 0.0* 0.3 - 6.2 % Final   • Basophil % 03/17/2018 0.2  0.0 - 1.5 % Final   • Immature Grans % 03/17/2018 0.3  0.0 - 0.5 % Final   • Neutrophils, Absolute 03/17/2018 8.23* 1.90 - 8.10 10*3/mm3 Final   • Lymphocytes, Absolute 03/17/2018 1.13  0.90 - 4.80 10*3/mm3 Final   • Monocytes, Absolute 03/17/2018 0.81  0.20 - 1.20 10*3/mm3 Final   • Eosinophils, Absolute 03/17/2018 0.00  0.00 - 0.70 10*3/mm3 Final   • Basophils, Absolute 03/17/2018 0.02  0.00 - 0.20 10*3/mm3 Final   • Immature Grans, Absolute 03/17/2018 0.03  0.00 - 0.03 10*3/mm3 Final       No results found.    Follow-up Appointments  No future appointments.      Discharge and Follow up Instructions:     I. ACTIVITIES:  1. Exercises:  ? Complete exercise program as taught by the hospital physical therapist 2 times per day  ? Exercise program will be advanced by the physical therapist  ? During the day be up ambulating every 2 hours (while awake) for short distances  ? Complete the ankle pump exercises at least 10 times per hour (while awake)  ? Elevate legs when in bed and for at least 30 minutes during the day.Use cold packs 20-30 minutes approximately 5 times per  day. This should be done before and after completing your exercises and at any time you are experiencing pain/ stiffness in your operative extremity.      2. Activities of Daily Living:  ? No tub baths, hot tubs, or swimming pools for 4 weeks  ? May shower and let water run over the incision on post-operative day #5 if no drainage. Do not scrub or rub the incision. Simply let the water run over the incision and pat dry.    II. Restrictions  ? Continue  Anterior hip precautions as taught at the hospital  ? Your surgeon will discuss with you when you will be able to drive again. Usual guidelines are you are to be off pain medications prior to driving.  ? Weight bearing is as tolerated  ? First week stay inside on even terrain. May go up and down stairs one stair at a time utilizing the hand rail once cleared by physical therapy to do so.  ? After one week, you may venture outside (if cleared to do so by physical therapist).    III. Precautions:  ? Everyone that comes near you should wash their hands  ? No elective dental, genital-urinary, or colon procedures or surgical procedures for 12 weeks after surgery unless absolutely necessary.  ?  If dental work or surgical procedure is deemed absolutely necessary, you will need to contact your surgeon as you will need to take antibiotics 1 hour prior to any dental work (including teeth cleanings).  ? Please discuss with your surgeon prophylactic antibiotics as the length of time this intervention will be necessary for you varies with each patient’s health history and situation.  ? Avoid sick people. If you must be around someone who is ill, they should wear a mask.  ? Avoid visits to the Emergency Room or Urgent Care. If you feel you need to go to the Emergency Room, please notify your Surgeon's office.  ? Stockings are to be worn for one week after surgery and are to be placed on in the morning and removed at night. Observe your skin when stocking is removed for any  problems. Monitor the stockings to ensure that any swelling is not causing the stockings to become too tight. In this case, remove stockings immediately.      IV. INCISION CARE:  ? Wash your hands prior to dressing changes  ? Change the dressing as needed to keep incision clean and dry. Utilize dry gauze and paper tape. Avoid touching the side of the gauze that goes against the incision with your hands.  ? No creams or ointments to the incision  ? May remove dressing once the incision is free of drainage  ? Do not touch or pick at the incision  ? Check incision every day and notify surgeon immediately if any of the following signs or symptoms are noted:  o Increase in redness  o Increase in swelling around the incision and of the entire extremity  o Increase in pain  o Drainage oozing from the incision  o Pulling apart of the edges of the incision  o Increase in overall body temperature (greater than 100.5 degrees)  ?  You have absorbable sutures with steristrips, please do not remove the  steristrips for 14 days, you can shower on them 6 days after surgery.      V. Medications:   1. Anticoagulants: You will be discharged on an anticoagulant. This is a prophylactic medication that helps prevent blood clots during your post-operative period.  You will be on  Aspirin 325 mg Enteric Coated every 12 hours  for  21 days.   ? While taking the anticoagulant, you should avoid taking any additional aspirin, ibuprofen (Advil or Motrin), Aleve (Naprosyn) or other non-steroidal anti-inflammatory medications.   ? Notify surgeon immediately if any serjio bleeding is noted in the urine, stool, emesis, or from the nose or the incision. Blood in the stool will often appear as black rather than red. Blood in urine may appear as pink. Blood in emesis may appear as brown/black like coffee grounds.  ? You will need to apply pressure for longer periods of time to any cuts or abrasions to stop bleeding  ? Avoid alcohol while taking  anticoagulants    2. Stool Softeners: You will be at greater risk of constipation after surgery due to being less mobile and the pain medications.   ? Take stool softeners as instructed by your surgeon while on pain medications. Over the counter Colace 100 mg 1-2 capsules twice daily.   ? If stools become too loose or too frequent, please decreases the dosage or stop the stool softener.  ? If constipation occurs despite use of stool softeners, you are to continue the stool softeners and add a laxative (Milk of Magnesia 1 ounce daily as needed).  ? Dulcolax oral tabs or suppository, or a fleets enema can also be utilized for constipation and can be obtained over the counter.   ? If above interventions are unsuccessful in inducing bowel movements, please contact your surgeon's office / family physician's office.  ? Drink plenty of fluids, and eat fruits and vegetables during your recovery time    3. Pain Medications utilized after surgery are narcotics and the law requires that the following information be given to all patients that are prescribed narcotics:  ? CLASSIFICATION: Pain medications are called Opioids and are narcotics  ? LEGALITIES: It is illegal to share narcotics with others and to drive within 24 hours of taking narcotics  ? POTENTIAL SIDE EFFECTS: Potential side effects of opioids include: nausea, vomiting, itching, dizziness, drowsiness, dry mouth, constipation, and difficulty urinating.  ? POTENTIAL ADVERSE EFFECTS:   o Opioid tolerance can develop with use of pain medications and this simply means that it requires more and more of the medication to control pain; however, this is seen more in patients that use opioids for longer periods of time.  o Opioid dependence can develop with use of Opioids and this simply means that to stop the medication can cause withdrawal symptoms; however, this is seen with patients that use Opioids for longer periods of time.  o Opioid addiction can develop with use of  Opioids and the incidence of this is very unlikely in patients who take the medications as ordered and stop the medications as instructed.  o Opioid overdose can be dangerous, but is unlikely when the medication is taken as ordered and stopped when ordered. It is important not to mix opioids with alcohol or with and type of sedative such as Benadryl as this can lead to over sedation and respiratory difficulty.  ? DOSAGE:   o Pain medications will need to be taken consistently for the first week to decrease pain and promote adequate pain relief and participation in physical therapy.  o After the initial surgical pain begins to resolve, you may begin to decrease the pain medication. By the end of 6 weeks, you should be off of pain medications.  o Refills will not be given by the office during evening hours, on weekends, or after 6 weeks post-op.  o To seek refills on pain medications during the initial 6 week post-operative period, you must call the office 48 hours in advance to request the refill. The office will then notify you when to  the prescription. DO NOT wait until you are out of the medication to request a refill.    V. FOLLOW-UP VISITS:  ? You will need to follow up in the office with your surgeon in  4/4/2018. Please call this number [phone]  to schedule this appointment.  If you have any concerns or suspected complications prior to your follow up visit, please call your surgeons office. Do not wait until your appointment time if you suspect complications. These will need to be addressed in the office promptly.    Date: 3/16/2018    Davy Bolanos MD     Addendum:    I have personally examined this patient. I agree with the above findings and treatment  plan.     Sergio Balderas MD  3/17/2018      CC: Nito Cervantes MD; MD Sergio Francis MD

## 2018-03-17 NOTE — PROGRESS NOTES
Name: Keila Caldwell ADMIT: 3/16/2018   : 1960  PCP: Nito Cervantes MD    MRN: [id number] LOS: 1 days   AGE/SEX: 58 y.o. female  ROOM: [facility]     Subjective   Denies any chest pain, SOA, nausea, vomiting or diarrhea. No lightheadedness or dizziness with standing.    Objective   Vital Signs  Temp:  [97.6 °F (36.4 °C)-98.6 °F (37 °C)] 97.6 °F (36.4 °C)  Heart Rate:  [] 84  Resp:  [16-17] 16  BP: ()/(49-76) 85/56  SpO2:  [96 %-100 %] 96 %  on  Flow (L/min):  [2-4] 2;   Device (Oxygen Therapy): room air  Body mass index is 23.43 kg/m².    Physical Exam   Constitutional: She is oriented to person, place, and time. No distress.   Cardiovascular: Normal rate and regular rhythm.    No murmur heard.  Pulmonary/Chest: Effort normal and breath sounds normal.   Abdominal: Soft. Bowel sounds are normal. She exhibits no distension. There is no tenderness.   Musculoskeletal: Normal range of motion. She exhibits no edema.   Neurological: She is alert and oriented to person, place, and time.   Skin: Skin is warm and dry. She is not diaphoretic.       Results Review:       I reviewed the patient's new clinical results.    Results from last 7 days  Lab Units 18  0318   WBC 10*3/mm3 10.22   HEMOGLOBIN g/dL 9.7*   PLATELETS 10*3/mm3 190     Results from last 7 days  Lab Units 18  0318   SODIUM mmol/L 138   POTASSIUM mmol/L 4.0   CHLORIDE mmol/L 102   CO2 mmol/L 23.0   BUN mg/dL 19   CREATININE mg/dL 0.94   GLUCOSE mg/dL 109*   Estimated Creatinine Clearance: 73.7 mL/min (by C-G formula based on SCr of 0.94 mg/dL).  Results from last 7 days  Lab Units 18  0318   CALCIUM mg/dL 7.1*         levothyroxine 100 mcg Oral Daily   topiramate 50 mg Oral Nightly       sodium chloride 100 mL/hr Last Rate: 100 mL/hr (18 0100)         Assessment/Plan      Active Hospital Problems (** Indicates Principal Problem)    Diagnosis Date Noted   • **Primary osteoarthritis of left hip [M16.12] 2018   •  Postoperative hypotension [I95.89] 03/17/2018   • Osteoarthritis of one hip [M16.10] 03/16/2018   • Hypothyroidism [E03.9] 03/15/2016   • LBP (low back pain) [M54.5] 03/15/2016      Resolved Hospital Problems    Diagnosis Date Noted Date Resolved   No resolved problems to display.       Ms. Caldwell is a 58 y.o. female who is POD#1 LT TOTAL HIP ARTHROPLASTY ANTERIOR WITH HANA TABLE.    · She appears stable thus far after surgery. She has had low blood pressures since last evening. She says her normal blood pressure is around 100/70. She is asymptomatic. Her renal function is stable. She is tolerating a diet. I think we can stop continuous IV fluids and use small boluses if she is symptomatic or has systolic is less than 80.   · Would continue to monitor renal function.      Toni Angulo MD  [facility]  03/17/18  10:33 AM

## 2018-03-17 NOTE — PROGRESS NOTES
Continued Stay Note  [facility]     Patient Name: Keila Caldwell  MRN: [id number]  Today's Date: 3/17/2018    Admit Date: 3/16/2018          Discharge Plan     Row Name 03/17/18 1320       Plan    Plan Home with [facility]    Patient/Family in Agreement with Plan yes    Plan Comments Spoke with patient via room 883P phone and she would like to use [facility](Luis Miguel notified at ext. [phone]).  Informed KIRK Euceda that patient has chosen to use [facility] at discharge.....Khushboo Diallo RN,CCP               Discharge Codes    No documentation.       Expected Discharge Date and Time     Expected Discharge Date Expected Discharge Time    Mar 17, 2018             Khushboo Diallo RN

## 2018-03-17 NOTE — PLAN OF CARE
Problem: Patient Care Overview  Goal: Plan of Care Review   03/17/18 7557   Coping/Psychosocial   Plan of Care Reviewed With patient   Plan of Care Review   Progress improving   OTHER   Outcome Summary Pt demonstrates good progress w/mobility. Independent with HEP and demonstrates good awareness of safety precautions. Pt navigated stairs with use of walker and CGA; ambulated in hallway with supervision. Pt planning to DC home later today and continue w/HH PT; no problems anticipated.

## 2018-03-17 NOTE — THERAPY TREATMENT NOTE
Acute Care - Physical Therapy Treatment Note  [facility]     Patient Name: Keila Caldwell  : 1960  MRN: [id number]  Today's Date: 3/17/2018  Onset of Illness/Injury or Date of Surgery: 18     Referring Physician: Andrey    Admit Date: 3/16/2018    Visit Dx:    ICD-10-CM ICD-9-CM   1. Decreased mobility R26.89 781.99   2. Primary osteoarthritis of left hip M16.12 715.15     Patient Active Problem List   Diagnosis   • Hypothyroidism   • Chronic nausea   • LBP (low back pain)   • History of thyroidectomy   • Hypocalcemia   • Hypoparathyroidism   • Intractable chronic migraine without aura   • Postmenopausal status   • Preoperative clearance       Therapy Treatment    Therapy Treatment / Health Promotion    Treatment Time/Intention  Discipline: physical therapist (Maria R العلي PT)  Document Type: therapy note (daily note) (Maria R العلي PT)  Subjective Information: complains of, pain (Maria R العلي PT)  Patient/Family Observations: Sitting up in chair in no acute distress (Maria R العلي PT)  Patient Effort: good (Maria R العلي PT)  Existing Precautions/Restrictions: fall, hip, anterior (Maria R العلي PT)  Plan of Care Review  Plan of Care Reviewed With: patient (Maria R العلي PT)    Vitals/Pain/Safety  Pain Assessment  Additional Documentation: Pain Scale: Numbers Pre/Post-Treatment (Group) (Maria R العلي PT)  Pain Scale: Numbers Pre/Post-Treatment  Pain Scale: Numbers, Pretreatment: 4/10 (Maria R العلي PT)  Pain Location - Side: Left (Maria R العلي PT)  Pain Location - Orientation: anterior (Maria R العلي PT)  Pain Location: hip (Maria R العلي PT)  Pre/Post Treatment Pain Comment: pain improved w/rest and LE elevation (Maria R العلي PT)  Pain Scale: Word Pre/Post-Treatment  Pain Location - Side: Left (Maria R العلي PT)  Pain Location - Orientation: anterior (Maria R العلي PT)  Pain Location: hip (Maria R العلي PT)  Pain Scale: FACES Pre/Post-Treatment  Pain Location - Side: Left (Maria R العلي PT)  Pain Location -  Orientation: anterior (Maria R العلي PT)  Pain Location: hip (Maria R العلي PT)  Safety Issues, Functional Mobility  Impairments Affecting Function (Mobility): pain (Maria R العلي PT)  Positioning and Restraints  Pre-Treatment Position: sitting in chair/recliner (Maria R العلي PT)  Post Treatment Position: chair (Maria R العلي PT)  In Chair: reclined, call light within reach, encouraged to call for assist, legs elevated (Maria R العلي PT)    Mobility,ADL,Motor, Modality  Bed Mobility Assessment/Treatment  Comment (Bed Mobility): up in chair (Maria R العلي PT)  Sit-Stand Transfer  Sit-Stand [place] (Transfers): supervision (Maria R العلي PT)  Assistive Device (Sit-Stand Transfers): walker, front-wheeled (Maria R العلي PT)  Stand-Sit Transfer  Stand-Sit [place] (Transfers): supervision (Maria R العلي PT)  Assistive Device (Stand-Sit Transfers): walker, front-wheeled (Maria R العلي PT)  Gait/Stairs Assessment/Training  [place] Level (Gait): supervision (Maria R العلي PT)  Assistive Device (Gait): walker, front-wheeled (Maria R العلي PT)  Distance in Feet (Gait): 120 (Maria R العلي PT)  Pattern (Gait): step-through (Maria R العلي PT)  Deviations/Abnormal Patterns (Gait): left sided deviations, antalgic (Maria R العلي PT)  [place] Level (Stairs): contact guard, verbal cues (Maria R العلي PT)  Assistive Device (Stairs): other (see comments) (walker) (Maria R العلي PT)  Number of Steps (Stairs): 2 (Maria R العلي PT)  Ascending Technique (Stairs): step-to-step (Maria R العلي PT)  Descending Technique (Stairs): step-to-step (Maria R العلي PT)  Stairs, Safety Issues:  (none) (Maria R العلي PT)  Comment (Gait/Stairs): Navigated 2 stairs with walker by ascending backwards; cues for sequencing. Demonstrates reciprocal gait pattern with ambulation; no LOB or safety concerns. (Maria R العلي PT)                 ROM/MMT             Sensory, Edema, Orthotics          Cognition, Communication, Swallow  Cognitive  Assessment/Intervention  Additional Documentation: Cognitive Assessment/Intervention (Group) (Maria R العلي, PT)  Cognitive Assessment/Intervention- PT/OT  Affect/Mental Status (Cognitive): WFL (Maria R العلي, PT)  Orientation Status (Cognition): oriented x 4 (Maria R العلي, PT)  Follows Commands (Cognition): WFL (Maria R العلي, PT)  Cognitive Function (Cognitive): WFL (Maria R العلي, PT)  Personal Safety Interventions: fall prevention program maintained, gait belt, muscle strengthening facilitated, nonskid shoes/slippers when out of bed, supervised activity (Maria R العلي, PT)    Outcome Summary               PT Rehab Goals     Row Name 03/16/18 6689             Bed Mobility Goal 1 (PT)    Activity/Assistive Device (Bed Mobility Goal 1, PT) bed mobility activities, all  -EM      [place] Level/Cues Needed (Bed Mobility Goal 1, PT) independent  -EM      Time Frame (Bed Mobility Goal 1, PT) 2 days  -EM         Transfer Goal 1 (PT)    Activity/Assistive Device (Transfer Goal 1, PT) transfers, all  -EM      [place] Level/Cues Needed (Transfer Goal 1, PT) independent  -EM      Time Frame (Transfer Goal 1, PT) 2 days  -EM         Gait Training Goal 1 (PT)    Activity/Assistive Device (Gait Training Goal 1, PT) walker, rolling  -EM      [place] Level (Gait Training Goal 1, PT) conditional independence  -EM      Distance (Gait Goal 1, PT) 150  -EM      Time Frame (Gait Training Goal 1, PT) 2 days  -EM         Stairs Goal 1 (PT)    Activity/Assistive Device (Stairs Goal 1, PT) stairs, all skills  -EM      [place] Level/Cues Needed (Stairs Goal 1, PT) contact guard assist  -EM      Number of Stairs (Stairs Goal 1, PT) 3  -EM      Time Frame (Stairs Goal 1, PT) 2 days  -EM         Patient Education Goal (PT)    [place]/Cues/Accuracy (Memory Goal 2, PT) demonstrates adequately;verbalizes understanding  -EM      Time Frame (Patient Education Goal, PT) 2 days  -EM        User Key  (r) = Recorded By, (t) = Taken By,  (c) = Cosigned By    Initials Name Provider Type    EM Gabriela Booker, PT Physical Therapist          Physical Therapy Education     Title: PT OT SLP Therapies (Done)     Topic: Physical Therapy (Done)     Point: Mobility training (Done)    Learning Progress Summary     Learner Status Readiness Method Response Comment Documented by    Patient Done Israeler E,TB VU,NR  EE 03/17/18 1304     Active Acceptance E NR  EM 03/16/18 1704          Point: Home exercise program (Done)    Learning Progress Summary     Learner Status Readiness Method Response Comment Documented by    Patient Done Eager E,TB VU,NR  EE 03/17/18 1304     Active Acceptance E NR  EM 03/16/18 1704          Point: Body mechanics (Done)    Learning Progress Summary     Learner Status Readiness Method Response Comment Documented by    Patient Done Eager E,TB VU,NR  EE 03/17/18 1304          Point: Precautions (Done)    Learning Progress Summary     Learner Status Readiness Method Response Comment Documented by    Patient Done Israeler E,TB VU,NR  EE 03/17/18 1304                      User Key     Initials Effective Dates Name Provider Type Discipline     12/01/15 -  Maria R العلي, PT Physical Therapist PT    EM 12/01/15 -  Gabriela Booker, PT Physical Therapist PT                    PT Recommendation and Plan     Plan of Care Reviewed With: patient  Progress: improving  Outcome Summary: Pt demonstrates good progress w/mobility. Independent with HEP and demonstrates good awareness of safety precautions. Pt navigated stairs with use of walker and CGA; ambulated in hallway with supervision. Pt planning to DC home later today and continue w/HH PT; no problems anticipated.           Outcome Measures     Row Name 03/17/18 1300 03/16/18 1700          How much help from another person do you currently need...    Turning from your back to your side while in flat bed without using bedrails? 4  -EE 4  -EM     Moving from lying on back to sitting on the side of a  flat bed without bedrails? 4  -EE 3  -EM     Moving to and from a bed to a chair (including a wheelchair)? 4  -EE 3  -EM     Standing up from a chair using your arms (e.g., wheelchair, bedside chair)? 4  -EE 3  -EM     Climbing 3-5 steps with a railing? 3  -EE 3  -EM     To walk in hospital room? 4  -EE 3  -EM     AM-PAC 6 Clicks Score 23  -EE 19  -EM        Functional Assessment    Outcome Measure Options AM-PAC 6 Clicks Basic Mobility (PT)  -EE AM-PAC 6 Clicks Basic Mobility (PT)  -EM       User Key  (r) = Recorded By, (t) = Taken By, (c) = Cosigned By    Initials Name Provider Type    EE Maria R العلي, PT Physical Therapist    EM Gabriela Booker PT Physical Therapist           Time Calculation:         PT Charges     Row Name 03/17/18 1306             Time Calculation    Start Time 0830  -EE      Stop Time 0915  -EE      Time Calculation (min) 45 min  -EE      PT Received On 03/17/18  -EE        User Key  (r) = Recorded By, (t) = Taken By, (c) = Cosigned By    Initials Name Provider Type    EE Maria R العلي, PT Physical Therapist          Therapy Charges for Today     Code Description Service Date Service Provider Modifiers Qty    24620509880 HC PT MOBILITY DISCHARGE 3/17/2018 Maria R العلي, PT GP, CI 1    66609469788 HC PT THER PROC EA 15 MIN 3/17/2018 Maria R العلي PT GP 1    31667805267 HC PT THER PROC GROUP 3/17/2018 Maria R العلي, PT GP 1          PT G-Codes  Outcome Measure Options: AM-PAC 6 Clicks Basic Mobility (PT)  Functional Limitation: Mobility: Walking and moving around  Mobility: Walking and Moving Around Discharge Status (): At least 1 percent but less than 20 percent impaired, limited or restricted    Maria R العلي PT  3/17/2018

## 2018-03-17 NOTE — PROGRESS NOTES
Orthopedic Progress Note      Patient: Keila Caldwell  YOB: 1960     Date of Admission: 3/16/2018  9:05 AM Medical Record Number: [id number]     Attending Physician: Sergio Balderas, *    Status Post:  LT TOTAL HIP ARTHROPLASTY ANTERIOR WITH HANA TABLE Post Operative Day Number: 1    Subjective : No new orthopaedic complaints     Pain Relief: some relief with present medication.     Systemic Complaints: No Complaints, BP low but asymptomatic, she has concerns regarding aspirin being her blood thinner due to her history of kidney disease.   Vitals:    03/17/18 0517 03/17/18 0726 03/17/18 0730 03/17/18 1138   BP: (!) 82/52  Comment: after bolus was in (!) 82/60 (!) 85/56 (!) 87/56   BP Location: Right arm Right arm Right arm Left arm   Patient Position: Sitting Sitting Sitting Sitting   Pulse:   84 88   Resp:   16 16   Temp:   97.6 °F (36.4 °C) 97.6 °F (36.4 °C)   TempSrc:   Oral Oral   SpO2:   96%    Weight:       Height:           Physical Exam: 58 y.o. female    General Appearance:       Alert, cooperative, in no acute distress                  Extremities:    Dressing Clean, Dry and Intact         Incision healthy without signs or symptoms of infections         No clinical sign of DVT        Able to do good movements of digits    Pulses:   Pulses palpable and equal bilaterally           Diagnostic Tests:       Results from last 7 days  Lab Units 03/17/18  0318   WBC 10*3/mm3 10.22   HEMOGLOBIN g/dL 9.7*   HEMATOCRIT % 30.2*   PLATELETS 10*3/mm3 190       Results from last 7 days  Lab Units 03/17/18  0318   SODIUM mmol/L 138   POTASSIUM mmol/L 4.0   CHLORIDE mmol/L 102   CO2 mmol/L 23.0   BUN mg/dL 19   CREATININE mg/dL 0.94   GLUCOSE mg/dL 109*   CALCIUM mg/dL 7.1*         No results found for: CRYSTAL]  No results found for: CULTURE]  No results found for: URICACID]  No results found.      Current Medications:  Scheduled Meds:  levothyroxine 100 mcg Oral Daily   topiramate 50 mg Oral Nightly      Continuous Infusions:  sodium chloride 100 mL/hr Last Rate: Stopped (03/17/18 1046)     PRN Meds:.•  acetaminophen  •  bisacodyl  •  bisacodyl  •  docusate sodium  •  HYDROcodone-acetaminophen  •  HYDROcodone-acetaminophen  •  HYDROmorphone **AND** naloxone  •  melatonin  •  ondansetron **OR** ondansetron ODT **OR** ondansetron  •  sennosides-docusate sodium    Assessment: Status post  LT TOTAL HIP ARTHROPLASTY ANTERIOR WITH HANA TABLE    Patient Active Problem List   Diagnosis   • Hypothyroidism   • Chronic nausea   • LBP (low back pain)   • History of thyroidectomy   • Hypocalcemia   • Hypoparathyroidism   • Intractable chronic migraine without aura   • Postmenopausal status   • Primary osteoarthritis of left hip   • Preoperative clearance   • Osteoarthritis of one hip   • Postoperative hypotension       PLAN:   Continues current post-op course  Anticoagulation: Aspirin started , will use once a day dose of 325 mg for DVT prophylaxis. Discussed that the risk is low for her kidney function.   Dressing Change prn  Mobilize with PT as tolerated per protocol  Will give her one bolus of 500cc of IV fluid    Weight Bearing: WBAT  Discharge Plan: OK to plan for discharge in  today to home and home health  from orthopadic perspective.    Sergio Balderas MD    Date: 3/17/2018    Time: 12:37 PM

## 2018-03-17 NOTE — CONSULTS
CONSULT NOTE    INTERNAL MEDICINE   [facility]       Patient Identification:  Name: Keila Caldwell  Age: 58 y.o.  Sex: female  :  1960  MRN: [id number]             Date of Consultation: 3/16/2018      Primary Care Physician: Nito Cervantes MD                               Requesting Physician: Dr. Balderas  Reason for Consultation: Management of postoperative hypertension and medical issues in a patient who is status post left hip arthroplasty    Chief Complaint:  Left hip pain.    History of Present Illness:   Patient is a 58-year-old female with past medical history as noted below has been battling with arthritis for quite some time.  She was constantly taking NSAIDs including ibuprofen and Naprosyn until a few years ago when she developed acute interstitial nephritis and renal failure.  Since then she's been unable to take any anti-inflammatory medications for arthritis and has been resorted to use Plaquenil.  In that background her left hip was gradually deteriorating to the point it was affecting her day-to-day living.  Patient was evaluated by orthopedic surgery service and was brought in today for elective left total hip arthroplasty via anterior approach.  Internal medicine service was asked to manage her medical issues.  Patient is also concerned about the use of aspirin as she thinks that it is related to NSAIDs and it may cause her to have renal shutdown.  She did not have any symptoms of chest pain shortness breath nausea vomiting diarrhea abdominal pain and she come into the hospital for surgery.  Since surgery she is doing fine.      Past Medical History:  Past Medical History:   Diagnosis Date   • Arthritis    • Hypothyroidism    • Left hip pain    • Migraines     ON MED   • UTI (urinary tract infection)     CURRENTLY FINISHING AB FOR A UTI FOUND COUPLE WEEKS AGO     Past Surgical History:  Past Surgical History:   Procedure Laterality Date   • COLONOSCOPY     • FOOT  SURGERY Right     CYST   • HYSTERECTOMY     • TONSILLECTOMY        Home Meds:  Prescriptions Prior to Admission   Medication Sig Dispense Refill Last Dose   • acetaminophen (TYLENOL) 500 MG tablet Take 500 mg by mouth Every 6 (Six) Hours As Needed for Mild Pain .   3/15/2018 at 1700   • calcitriol (ROCALTROL) 0.5 MCG capsule Take 0.5 mcg by mouth Every Morning.   3/15/2018 at 1200   • CALCIUM PO Take 3,000 mg by mouth Every Morning. CHEWABLE TABLETS   3/15/2018 at 1500   • ciprofloxacin (CIPRO) 500 MG tablet Take 500 mg by mouth 2 (Two) Times a Day.   3/15/2018 at 2130   • estradiol (ESTRACE) 0.1 MG/GM vaginal cream Insert 1 applicator into the vagina Every Night.  6    • hydroxychloroquine (PLAQUENIL) 200 MG tablet Take 200 mg by mouth 2 (Two) Times a Day. TAKE 1 TABLET TWICE A DAY  3 3/15/2018 at 1800   • levothyroxine (SYNTHROID, LEVOTHROID) 100 MCG tablet Take 100 mcg by mouth Daily.      • sulfamethoxazole-trimethoprim (BACTRIM DS,SEPTRA DS) 800-160 MG per tablet Take 1 tablet by mouth 2 (Two) Times a Day. 10 tablet 0 3/15/2018 at 0800   • topiramate (TOPAMAX) 50 MG tablet Take 50 mg by mouth Every Night.   3/15/2018 at 2200   • cephalexin (KEFLEX) 500 MG capsule Take 1 capsule by mouth 3 (Three) Times a Day. (Patient taking differently: Take 500 mg by mouth. FOR 5 DAYS ONCE A DAY FOR UTI    LAST TAB 3-1-18) 15 capsule 0 2/23/2018   • conjugated estrogens (PREMARIN) 0.625 MG/GM vaginal cream Insert  into the vagina Daily.      • rizatriptan (MAXALT) 10 MG tablet Take 10 mg by mouth As Needed. TAKE 1 TABLET BY MOUTH ONCE AS NEEDED FOR MIGRAINE FOR UP TO 1 DOSE MAY REPEAT IN 2 HOURS IF NEEDED  5 3/2/2018     Current Meds:     Current Facility-Administered Medications:   •  acetaminophen (TYLENOL) tablet 325 mg, 325 mg, Oral, Q4H PRN, Sergio Balderas MD  •  bisacodyl (DULCOLAX) EC tablet 10 mg, 10 mg, Oral, Daily PRN, Sergio Alvarezanti, MD  •  bisacodyl (DULCOLAX) suppository 10 mg, 10 mg, Rectal,  Daily PRN, Sergio Balderas MD  •  ceFAZolin in dextrose (ANCEF) IVPB solution 2 g, 2 g, Intravenous, Q8H, Sergio Balderas MD, 2 g at 03/16/18 2014  •  docusate sodium (COLACE) capsule 100 mg, 100 mg, Oral, BID PRN, Sergio Balderas MD  •  HYDROcodone-acetaminophen (NORCO) 7.5-325 MG per tablet 1 tablet, 1 tablet, Oral, Q4H PRN, Sergio Balderas MD  •  HYDROcodone-acetaminophen (NORCO) 7.5-325 MG per tablet 2 tablet, 2 tablet, Oral, Q4H PRN, Sergio Balderas MD, 2 tablet at 03/16/18 1828  •  HYDROmorphone (DILAUDID) injection 0.5 mg, 0.5 mg, Intravenous, Q2H PRN, 0.5 mg at 03/16/18 1648 **AND** naloxone (NARCAN) injection 0.1 mg, 0.1 mg, Intravenous, Q5 Min PRN, Sergio Balderas MD  •  lactated ringers infusion, 100 mL/hr, Intravenous, Continuous, Lisa Crum MD  •  levothyroxine (SYNTHROID, LEVOTHROID) tablet 100 mcg, 100 mcg, Oral, Daily, Sergio Balderas MD  •  melatonin tablet 1 mg, 1 mg, Oral, Nightly PRN, Sergio Balderas MD  •  ondansetron (ZOFRAN) tablet 4 mg, 4 mg, Oral, Q6H PRN **OR** ondansetron ODT (ZOFRAN-ODT) disintegrating tablet 4 mg, 4 mg, Oral, Q6H PRN **OR** ondansetron (ZOFRAN) injection 4 mg, 4 mg, Intravenous, Q6H PRN, Sergio Balderas MD  •  sennosides-docusate sodium (SENOKOT-S) 8.6-50 MG tablet 2 tablet, 2 tablet, Oral, BID PRN, Sergio Balderas MD  •  sodium chloride 0.9 % infusion, 100 mL/hr, Intravenous, Continuous, Sergio Balderas MD  •  topiramate (TOPAMAX) tablet 50 mg, 50 mg, Oral, Nightly, Sergio Balderas MD, 50 mg at 03/16/18 2014  Allergies:  Allergies   Allergen Reactions   • Naproxen Other (See Comments)     Kidney dysfunction   • Nsaids Other (See Comments)     KIDNEY DYSFUNCTION     Social History:   Social History   Substance Use Topics   • Smoking status: Never Smoker   • Smokeless tobacco: Never Used   • Alcohol use Yes      Comment: social      Family History:  Family History  "  Problem Relation Age of Onset   • No Known Problems Mother    • No Known Problems Father    • No Known Problems Sister    • No Known Problems Brother    • No Known Problems Son    • No Known Problems Daughter    • No Known Problems Maternal Grandmother    • No Known Problems Maternal Grandfather    • No Known Problems Paternal Grandmother    • No Known Problems Paternal Grandfather    • No Known Problems Cousin    • Malig Hyperthermia Neg Hx           Review of Systems  See history of present illness and past medical history.  Sedation: Remarkable for no fever or chills  Cardio vascular: Remarkable for no chest pain or shortness of breath  GI: Remarkable for no nausea vomiting or diarrhea  : Remarkable for no burning in urination frequency urgency  Musculoskeletal: Remarkable for arthritic discomfort involving multiple joints most severe was left hip for which he had surgery.    Vitals:   BP 91/60 (BP Location: Left arm, Patient Position: Lying)   Pulse 92   Temp 97.7 °F (36.5 °C) (Oral)   Resp 16   Ht 172.7 cm (68\")   Wt 69.9 kg (154 lb 1.6 oz)   SpO2 98%   BMI 23.43 kg/m²   I/O:   Intake/Output Summary (Last 24 hours) at 03/16/18 2130  Last data filed at 03/16/18 1752   Gross per 24 hour   Intake             2140 ml   Output              200 ml   Net             1940 ml     Exam:  General Appearance:    Alert, cooperative, no distress, appears stated age   Head:    Normocephalic, without obvious abnormality, atraumatic   Eyes:    PERRL, conjunctiva/corneas clear, EOM's intact, both eyes   Ears:    Normal external ear canals, both ears   Nose:   Nares normal, septum midline, mucosa normal, no drainage    or sinus tenderness   Throat:   Lips, tongue, gums normal; oral mucosa pink and moist   Neck:   Supple, symmetrical, trachea midline, no adenopathy;     thyroid:  no enlargement/tenderness/nodules; no carotid    bruit or JVD   Back:     Symmetric, no curvature, ROM normal, no CVA tenderness   Lungs:     " Clear to auscultation bilaterally, respirations unlabored   Chest Wall:    No tenderness or deformity    Heart:    Regular rate and rhythm, S1 and S2 normal, no murmur, rub   or gallop   Abdomen:     Soft, non-tender, bowel sounds active all four quadrants,     no masses, no hepatomegaly, no splenomegaly   Extremities:   Left surgical site dressed.     Pulses:   Pulses palpable in all extremities; symmetric all extremities   Skin:   Skin color normal, Skin is warm and dry,  no rashes or palpable lesions   Neurologic:   CNII-XII intact, motor strength grossly intact, sensation grossly intact to light touch, no focal deficits noted       Data Review:      I reviewed the patient's new clinical results.            Assessment:  Active Hospital Problems (** Indicates Principal Problem)    Diagnosis Date Noted   • **Primary osteoarthritis of left hip [M16.12] 01/28/2018   • Osteoarthritis of one hip [M16.10] 03/16/2018   • Hypothyroidism [E03.9] 03/15/2016   • LBP (low back pain) [M54.5] 03/15/2016      Resolved Hospital Problems    Diagnosis Date Noted Date Resolved   No resolved problems to display.       Plan:  Severe osteoarthritis with primary osteoarthritis of the left hip status post left total hip arthroplasty via anterior approach-management of pain DVT prophylaxis activity guidelines per week surgery service.  Patient is adamant about not using aspirin as her DVT prophylaxis other option would be either Eliquis 2.5 mg by mouth twice a day for 21 days or until her mobility is improved other option would be to use warfarin or Lovenox.  Selection of one of these agents for DVT prophylaxis in the context of intolerance to NSAIDs and has declined from aspirin and left hip surgery would be deferred to orthopedic surgery service.  Intolerance to NSAID with history of acute interstitial nephritis and renal failure in the past as this occurred on the long-term use of NSAIDs I'm not sure this is allergic reaction to NSAID  versus nephrotoxicity due to prolonged use of NSAIDs.  We will follow patient's wishes and avoid NSAIDs therapy.  She is controlling her arthritis with earnest Mccollum.  Hypothyroidism currently on hormone replacement therapy.  Low back pain continue with present management as she was taking prior to this surgery.    Dione Mcdermott MD   3/16/2018  9:30 PM  Much of this encounter note is an electronic transcription/translation of spoken language to printed text. The electronic translation of spoken language may permit erroneous, or at times, nonsensical words or phrases to be inadvertently transcribed; Although I have reviewed the note for such errors, some may still exist

## 2018-03-17 NOTE — PLAN OF CARE
Problem: Patient Care Overview  Goal: Plan of Care Review  Outcome: Ongoing (interventions implemented as appropriate)   03/17/18 7310   Coping/Psychosocial   Plan of Care Reviewed With patient   Plan of Care Review   Progress improving   OTHER   Outcome Summary Patient receiving norco every 4 hours for pain, up with walker minimal assist, BP low, waiting on HGB from lab and will call MD to follow up , will continue to monitor.       Problem: Hip Arthroplasty (Total, Partial) (Adult)  Goal: Signs and Symptoms of Listed Potential Problems Will be Absent, Minimized or Managed (Hip Arthroplasty)  Outcome: Ongoing (interventions implemented as appropriate)

## 2018-11-16 ENCOUNTER — APPOINTMENT (OUTPATIENT)
Dept: WOMENS IMAGING | Facility: HOSPITAL | Age: 58
End: 2018-11-16

## 2018-11-16 PROCEDURE — 77063 BREAST TOMOSYNTHESIS BI: CPT | Performed by: RADIOLOGY

## 2018-11-16 PROCEDURE — 77067 SCR MAMMO BI INCL CAD: CPT | Performed by: RADIOLOGY

## 2018-11-28 ENCOUNTER — OFFICE VISIT (OUTPATIENT)
Dept: FAMILY MEDICINE CLINIC | Facility: CLINIC | Age: 58
End: 2018-11-28

## 2018-11-28 VITALS
HEART RATE: 89 BPM | OXYGEN SATURATION: 98 % | SYSTOLIC BLOOD PRESSURE: 122 MMHG | RESPIRATION RATE: 18 BRPM | DIASTOLIC BLOOD PRESSURE: 74 MMHG | BODY MASS INDEX: 23.89 KG/M2 | WEIGHT: 157.6 LBS | TEMPERATURE: 97.5 F | HEIGHT: 68 IN

## 2018-11-28 DIAGNOSIS — M25.511 ACUTE PAIN OF RIGHT SHOULDER: Primary | ICD-10-CM

## 2018-11-28 DIAGNOSIS — Z11.59 NEED FOR HEPATITIS C SCREENING TEST: ICD-10-CM

## 2018-11-28 DIAGNOSIS — Z00.00 ROUTINE HEALTH MAINTENANCE: Primary | ICD-10-CM

## 2018-11-28 DIAGNOSIS — M54.50 LEFT-SIDED LOW BACK PAIN WITHOUT SCIATICA, UNSPECIFIED CHRONICITY: ICD-10-CM

## 2018-11-28 DIAGNOSIS — Z23 NEED FOR VACCINATION: ICD-10-CM

## 2018-11-28 PROBLEM — R79.89 LOW VITAMIN D LEVEL: Status: ACTIVE | Noted: 2018-07-17

## 2018-11-28 PROBLEM — Z01.818 PREOPERATIVE CLEARANCE: Status: RESOLVED | Noted: 2018-02-12 | Resolved: 2018-11-28

## 2018-11-28 LAB
BILIRUB BLD-MCNC: NEGATIVE MG/DL
CLARITY, POC: CLEAR
COLOR UR: YELLOW
GLUCOSE UR STRIP-MCNC: NEGATIVE MG/DL
KETONES UR QL: NEGATIVE
LEUKOCYTE EST, POC: NEGATIVE
NITRITE UR-MCNC: NEGATIVE MG/ML
PH UR: 7.5 [PH] (ref 5–8)
PROT UR STRIP-MCNC: NEGATIVE MG/DL
RBC # UR STRIP: NEGATIVE /UL
SP GR UR: 1.01 (ref 1–1.03)
UROBILINOGEN UR QL: NORMAL

## 2018-11-28 PROCEDURE — 90732 PPSV23 VACC 2 YRS+ SUBQ/IM: CPT | Performed by: NURSE PRACTITIONER

## 2018-11-28 PROCEDURE — 81003 URINALYSIS AUTO W/O SCOPE: CPT | Performed by: NURSE PRACTITIONER

## 2018-11-28 PROCEDURE — 90715 TDAP VACCINE 7 YRS/> IM: CPT | Performed by: NURSE PRACTITIONER

## 2018-11-28 PROCEDURE — 90471 IMMUNIZATION ADMIN: CPT | Performed by: NURSE PRACTITIONER

## 2018-11-28 PROCEDURE — 99214 OFFICE O/P EST MOD 30 MIN: CPT | Performed by: NURSE PRACTITIONER

## 2018-11-28 PROCEDURE — 90472 IMMUNIZATION ADMIN EACH ADD: CPT | Performed by: NURSE PRACTITIONER

## 2018-11-28 RX ORDER — DULOXETIN HYDROCHLORIDE 30 MG/1
CAPSULE, DELAYED RELEASE ORAL
Refills: 1 | COMMUNITY
Start: 2018-10-18 | End: 2021-03-10

## 2018-11-28 RX ORDER — LIOTHYRONINE SODIUM 5 UG/1
TABLET ORAL
COMMUNITY
Start: 2018-10-20 | End: 2021-04-13

## 2018-11-28 RX ORDER — ERGOCALCIFEROL 1.25 MG/1
50000 CAPSULE ORAL
Refills: 1 | COMMUNITY
Start: 2018-10-14

## 2018-11-28 NOTE — PROGRESS NOTES
Subjective   Keila Caldwell is a 58 y.o. female.   Chief Complaint   Patient presents with   • Back Pain     has been going on for several months lower left side    • Shoulder Pain     right shoulder      Vitals:    11/28/18 0753   BP: 122/74   Pulse: 89   Resp: 18   Temp: 97.5 °F (36.4 °C)   SpO2: 98%     No LMP recorded. Patient has had a hysterectomy.    History of Present Illness    Keila is a patient of Dr Cervantes who is here for an acute visit. She c/o right shoulder pain that has been on and off for a few months. This is a new problem. She is unsure of any injury. It does not radiate. It hurts with certain movements and improves with ice. She rates her pain 2/10/   She has had left lower back pain for several months as well. This is a new problem It comes and goes. It is worse with certain movement.   She states it was worse over the weekend because she was up doing a lot. It does not radiate and she denies weakness or numbness and tingling. She has had some increased urinary frequency and urgency but denies dysuria and hematuria. She has been using ice with some relief.     The following portions of the patient's history were reviewed and updated as appropriate: allergies, current medications, past family history, past medical history, past social history, past surgical history and problem list.    Review of Systems   Constitutional: Negative for chills, fatigue and fever.   Respiratory: Negative.    Cardiovascular: Negative.    Gastrointestinal: Negative for nausea.   Genitourinary: Positive for frequency and urgency. Negative for dysuria, hematuria and vaginal discharge.   Musculoskeletal: Positive for back pain.        Right shoulder pain    Psychiatric/Behavioral: Negative.        Objective   Physical Exam   Constitutional: Vital signs are normal. She appears well-developed and well-nourished. She does not appear ill. No distress.   Cardiovascular: Normal rate.   Pulmonary/Chest: Effort normal.    Abdominal: There is no CVA tenderness.   Musculoskeletal:        Right shoulder: She exhibits tenderness (right deltoid ) and pain. She exhibits normal range of motion, no swelling, no crepitus and no deformity.        Lumbar back: She exhibits normal range of motion, no tenderness and no bony tenderness.        Back:    Neurological: She is alert.   Skin: Skin is warm, dry and intact.   Psychiatric: She has a normal mood and affect.     Brief Urine Lab Results  (Last result in the past 365 days)      Color   Clarity   Blood   Leuk Est   Nitrite   Protein   CREAT   Urine HCG        11/28/18 0841 Yellow Clear Negative Negative Negative Negative                 Assessment/Plan   Keila was seen today for back pain and shoulder pain.    Diagnoses and all orders for this visit:    Acute pain of right shoulder    Left-sided low back pain without sciatica, unspecified chronicity  -     POC Urinalysis Dipstick, Automated    Need for vaccination  -     Tdap Vaccine Greater Than or Equal To 6yo IM  -     Pneumococcal Polysaccharide Vaccine 23-Valent Greater Than or Equal To 3yo Subcutaneous / IM      1. Low back pain- likely muscular, recommend tylenol and heating pad. UA was negative.   2. Right shoulder pain- tylenol or heat, will schedule an appt with ortho since this has been going on for a few months  3. HM- TDAP and pneumovax today, discussed getting shingrix at the pharmacy  Follow up if symptoms persist, worsen or if new symptoms develop, discussed scheduling a CPE.

## 2018-12-04 ENCOUNTER — CLINICAL SUPPORT (OUTPATIENT)
Dept: FAMILY MEDICINE CLINIC | Facility: CLINIC | Age: 58
End: 2018-12-04

## 2018-12-04 DIAGNOSIS — Z00.00 ROUTINE HEALTH MAINTENANCE: Primary | ICD-10-CM

## 2018-12-04 PROCEDURE — 85025 COMPLETE CBC W/AUTO DIFF WBC: CPT | Performed by: INTERNAL MEDICINE

## 2018-12-05 LAB
ALBUMIN SERPL-MCNC: 4.5 G/DL (ref 3.5–5.2)
ALBUMIN/GLOB SERPL: 2.4 G/DL
ALP SERPL-CCNC: 69 U/L (ref 39–117)
ALT SERPL-CCNC: 12 U/L (ref 1–33)
APPEARANCE UR: ABNORMAL
AST SERPL-CCNC: 24 U/L (ref 1–32)
BACTERIA #/AREA URNS HPF: ABNORMAL /HPF
BILIRUB SERPL-MCNC: 0.4 MG/DL (ref 0.1–1.2)
BILIRUB UR QL STRIP: NEGATIVE
BUN SERPL-MCNC: 20 MG/DL (ref 6–20)
BUN/CREAT SERPL: 22.5 (ref 7–25)
CALCIUM SERPL-MCNC: 8.2 MG/DL (ref 8.6–10.5)
CASTS URNS MICRO: ABNORMAL
CHLORIDE SERPL-SCNC: 101 MMOL/L (ref 98–107)
CHOLEST SERPL-MCNC: 212 MG/DL (ref 0–200)
CO2 SERPL-SCNC: 25.2 MMOL/L (ref 22–29)
COLOR UR: YELLOW
CREAT SERPL-MCNC: 0.89 MG/DL (ref 0.57–1)
EPI CELLS #/AREA URNS HPF: ABNORMAL /HPF
GLOBULIN SER CALC-MCNC: 1.9 GM/DL
GLUCOSE SERPL-MCNC: 89 MG/DL (ref 65–99)
GLUCOSE UR QL: NEGATIVE
HCV AB S/CO SERPL IA: <0.1 S/CO RATIO (ref 0–0.9)
HCV AB SERPL QL IA: NORMAL
HDLC SERPL-MCNC: 73 MG/DL (ref 40–60)
HGB UR QL STRIP: NEGATIVE
KETONES UR QL STRIP: NEGATIVE
LDLC SERPL CALC-MCNC: 120 MG/DL (ref 0–100)
LDLC/HDLC SERPL: 1.64 {RATIO}
LEUKOCYTE ESTERASE UR QL STRIP: ABNORMAL
NITRITE UR QL STRIP: NEGATIVE
PH UR STRIP: 6 [PH] (ref 5–8)
POTASSIUM SERPL-SCNC: 4 MMOL/L (ref 3.5–5.2)
PROT SERPL-MCNC: 6.4 G/DL (ref 6–8.5)
PROT UR QL STRIP: NEGATIVE
RBC #/AREA URNS HPF: ABNORMAL /HPF
SODIUM SERPL-SCNC: 142 MMOL/L (ref 136–145)
SP GR UR: 1.02 (ref 1–1.03)
TRIGL SERPL-MCNC: 96 MG/DL (ref 0–150)
UROBILINOGEN UR STRIP-MCNC: ABNORMAL MG/DL
VLDLC SERPL CALC-MCNC: 19.2 MG/DL (ref 5–40)
WBC #/AREA URNS HPF: ABNORMAL /HPF

## 2018-12-10 ENCOUNTER — OFFICE VISIT (OUTPATIENT)
Dept: FAMILY MEDICINE CLINIC | Facility: CLINIC | Age: 58
End: 2018-12-10

## 2018-12-10 VITALS
BODY MASS INDEX: 24.1 KG/M2 | HEART RATE: 82 BPM | TEMPERATURE: 98.4 F | RESPIRATION RATE: 16 BRPM | OXYGEN SATURATION: 98 % | HEIGHT: 68 IN | WEIGHT: 159 LBS | DIASTOLIC BLOOD PRESSURE: 70 MMHG | SYSTOLIC BLOOD PRESSURE: 112 MMHG

## 2018-12-10 DIAGNOSIS — E83.51 HYPOCALCEMIA: Primary | ICD-10-CM

## 2018-12-10 DIAGNOSIS — Z00.00 ANNUAL PHYSICAL EXAM: ICD-10-CM

## 2018-12-10 DIAGNOSIS — E89.0 POSTOPERATIVE HYPOTHYROIDISM: ICD-10-CM

## 2018-12-10 PROCEDURE — 99396 PREV VISIT EST AGE 40-64: CPT | Performed by: INTERNAL MEDICINE

## 2018-12-10 NOTE — PROGRESS NOTES
Subjective   Keila Caldwell is a 58 y.o. female. Patient is here today for   Chief Complaint   Patient presents with   • Annual Exam          Vitals:    12/10/18 1444   BP: 112/70   Pulse: 82   Resp: 16   Temp: 98.4 °F (36.9 °C)   SpO2: 98%       The following portions of the patient's history were reviewed and updated as appropriate: allergies, current medications, past family history, past medical history, past social history, past surgical history and problem list.    Past Medical History:   Diagnosis Date   • Arthritis    • Hypothyroidism    • Left hip pain    • Migraines     ON MED   • UTI (urinary tract infection)     CURRENTLY FINISHING AB FOR A UTI FOUND COUPLE WEEKS AGO      Allergies   Allergen Reactions   • Naproxen Other (See Comments)     Kidney dysfunction   • Nsaids Other (See Comments)     KIDNEY DYSFUNCTION      Social History     Socioeconomic History   • Marital status:      Spouse name: Not on file   • Number of children: Not on file   • Years of education: Not on file   • Highest education level: Not on file   Social Needs   • Financial resource strain: Not on file   • Food insecurity - worry: Not on file   • Food insecurity - inability: Not on file   • Transportation needs - medical: Not on file   • Transportation needs - non-medical: Not on file   Occupational History   • Not on file   Tobacco Use   • Smoking status: Never Smoker   • Smokeless tobacco: Never Used   Substance and Sexual Activity   • Alcohol use: Yes     Comment: social   • Drug use: No   • Sexual activity: Not on file   Other Topics Concern   • Not on file   Social History Narrative   • Not on file        Current Outpatient Medications:   •  acetaminophen (TYLENOL) 500 MG tablet, Take 500 mg by mouth Every 6 (Six) Hours As Needed for Mild Pain ., Disp: , Rfl:   •  calcitriol (ROCALTROL) 0.5 MCG capsule, Take 0.5 mcg by mouth Every Morning., Disp: , Rfl:   •  CALCIUM PO, Take 3,000 mg by mouth Every Morning. CHEWABLE  TABLETS, Disp: , Rfl:   •  CBD (cannabidiol) oral oil, Take  by mouth., Disp: , Rfl:   •  DULoxetine (CYMBALTA) 30 MG capsule, TAKE 1 CAPSULE BY ORAL ROUTE EVERY DAY, Disp: , Rfl: 1  •  estradiol (ESTRACE) 0.1 MG/GM vaginal cream, Insert 1 applicator into the vagina Every Night., Disp: , Rfl: 6  •  hydroxychloroquine (PLAQUENIL) 200 MG tablet, Take 200 mg by mouth 2 (Two) Times a Day. TAKE 1 TABLET TWICE A DAY, Disp: , Rfl: 3  •  levothyroxine (SYNTHROID, LEVOTHROID) 137 MCG tablet, Take 137 mcg by mouth Daily., Disp: , Rfl:   •  liothyronine (CYTOMEL) 5 MCG tablet, TAKE 1 TABLET BY MOUTH EVERY DAY, Disp: , Rfl:   •  rizatriptan (MAXALT) 10 MG tablet, Take 10 mg by mouth As Needed. TAKE 1 TABLET BY MOUTH ONCE AS NEEDED FOR MIGRAINE FOR UP TO 1 DOSE MAY REPEAT IN 2 HOURS IF NEEDED, Disp: , Rfl: 5  •  vitamin D (ERGOCALCIFEROL) 65150 units capsule capsule, TAKE 1 CAPSULE BY MOUTH EVERY 30 (THIRTY) DAYS, Disp: , Rfl: 1     EKG normal, was done in March    Objective   History of Present Illness Keila is here for an annual physical examination.  She has surgical hypothyroidism and is followed by endocrinology.  She also has migraine headaches and is followed by neurology.  She receives Botox injections.  He feels well.  She eats healthy and is physically active but does not exercise on a regular basis.  She had a total hip replacement earlier this year and never got back to regular exercise pattern.  Her weight is been stable.  Her blood pressure has been normal.  She states that she eats healthy.  She had a normal colonoscopy in 2005.  She thinks she has had a more recent one.  She sees her gynecologist annually.    Review of Systems   Constitutional: Positive for activity change. Negative for unexpected weight change.   Respiratory: Negative.    Cardiovascular: Negative.    Gastrointestinal:        Colonoscopy age 52 was normal   Genitourinary: Negative.    Neurological: Negative.    Psychiatric/Behavioral: Negative.         Physical Exam   Constitutional: She appears well-developed and well-nourished.   HENT:   Nose: Nose normal.   Mouth/Throat: Oropharynx is clear and moist.   Eyes: EOM are normal. Pupils are equal, round, and reactive to light.   Neck: No thyromegaly present.   Cardiovascular: Normal rate, regular rhythm and normal heart sounds.   Pulmonary/Chest: Effort normal and breath sounds normal.   Abdominal: Soft. Bowel sounds are normal. There is no tenderness.   Musculoskeletal: She exhibits no edema.   Lymphadenopathy:     She has no cervical adenopathy.   Skin: Skin is warm and dry.   Psychiatric: She has a normal mood and affect. Her behavior is normal. Judgment and thought content normal.   Vitals reviewed.      ASSESSMENT     Problem List Items Addressed This Visit        Endocrine    Hypothyroidism       Other    Hypocalcemia - Primary    Annual physical exam          PLAN  Patient Instructions   Pressure remains normal.  Suggest monitoring it periodically.  Normal is less than 120/80.  Total cholesterol is elevated.  HDL is excellent and LDL is moderately elevated.  A complete blood count, comprehensive metabolic panel, and urinalysis are normal.  Discussed diet and resuming an exercise program.  Discussed appropriate immunizations.  Will set up a screening colonoscopy at that is been greater than 10 years since her previous.      No Follow-up on file.

## 2018-12-10 NOTE — PATIENT INSTRUCTIONS
Pressure remains normal.  Suggest monitoring it periodically.  Normal is less than 120/80.  Total cholesterol is elevated.  HDL is excellent and LDL is moderately elevated.  A complete blood count, comprehensive metabolic panel, and urinalysis are normal.  Discussed diet and resuming an exercise program.  Discussed appropriate immunizations.  Will set up a screening colonoscopy at that is been greater than 10 years since her previous.

## 2018-12-11 ENCOUNTER — TELEPHONE (OUTPATIENT)
Dept: FAMILY MEDICINE CLINIC | Facility: CLINIC | Age: 58
End: 2018-12-11

## 2018-12-11 DIAGNOSIS — Z12.11 SCREENING FOR COLON CANCER: Primary | ICD-10-CM

## 2018-12-14 DIAGNOSIS — Z12.11 SCREENING FOR COLON CANCER: Primary | ICD-10-CM

## 2018-12-19 ENCOUNTER — APPOINTMENT (OUTPATIENT)
Dept: WOMENS IMAGING | Facility: HOSPITAL | Age: 58
End: 2018-12-19

## 2018-12-19 PROCEDURE — 76641 ULTRASOUND BREAST COMPLETE: CPT | Performed by: RADIOLOGY

## 2018-12-19 PROCEDURE — 77065 DX MAMMO INCL CAD UNI: CPT | Performed by: RADIOLOGY

## 2018-12-19 PROCEDURE — 77061 BREAST TOMOSYNTHESIS UNI: CPT | Performed by: RADIOLOGY

## 2018-12-19 PROCEDURE — G0279 TOMOSYNTHESIS, MAMMO: HCPCS | Performed by: RADIOLOGY

## 2019-01-08 ENCOUNTER — OFFICE VISIT (OUTPATIENT)
Dept: FAMILY MEDICINE CLINIC | Facility: CLINIC | Age: 59
End: 2019-01-08

## 2019-01-08 VITALS
RESPIRATION RATE: 18 BRPM | WEIGHT: 159 LBS | SYSTOLIC BLOOD PRESSURE: 116 MMHG | BODY MASS INDEX: 24.1 KG/M2 | HEART RATE: 86 BPM | DIASTOLIC BLOOD PRESSURE: 64 MMHG | HEIGHT: 68 IN | TEMPERATURE: 98 F

## 2019-01-08 DIAGNOSIS — R10.84 GENERALIZED ABDOMINAL PAIN: Primary | ICD-10-CM

## 2019-01-08 PROCEDURE — 99214 OFFICE O/P EST MOD 30 MIN: CPT | Performed by: INTERNAL MEDICINE

## 2019-01-08 RX ORDER — PANTOPRAZOLE SODIUM 40 MG/1
40 TABLET, DELAYED RELEASE ORAL DAILY
Qty: 30 TABLET | Refills: 0 | Status: SHIPPED | OUTPATIENT
Start: 2019-01-08 | End: 2019-03-11 | Stop reason: SDUPTHER

## 2019-01-08 NOTE — PATIENT INSTRUCTIONS
Symptoms sound more like gallbladder dysfunction.  We'll obtain a gallbladder ultrasound.  Start pantoprazole 40 mg one half-hour before breakfast.

## 2019-01-08 NOTE — PROGRESS NOTES
Subjective   Keila Caldwell is a 59 y.o. female. Patient is here today for   Chief Complaint   Patient presents with   • Abdominal Pain     gas and bloating           Vitals:    01/08/19 1604   BP: 116/64   Pulse: 86   Resp: 18   Temp: 98 °F (36.7 °C)     The following portions of the patient's history were reviewed and updated as appropriate: allergies, current medications, past family history, past medical history, past social history, past surgical history and problem list.    Past Medical History:   Diagnosis Date   • Arthritis    • Hypothyroidism    • Left hip pain    • Migraines     ON MED   • UTI (urinary tract infection)     CURRENTLY FINISHING AB FOR A UTI FOUND COUPLE WEEKS AGO      Allergies   Allergen Reactions   • Naproxen Other (See Comments)     Kidney dysfunction   • Nsaids Other (See Comments)     KIDNEY DYSFUNCTION      Social History     Socioeconomic History   • Marital status:      Spouse name: Not on file   • Number of children: Not on file   • Years of education: Not on file   • Highest education level: Not on file   Social Needs   • Financial resource strain: Not on file   • Food insecurity - worry: Not on file   • Food insecurity - inability: Not on file   • Transportation needs - medical: Not on file   • Transportation needs - non-medical: Not on file   Occupational History   • Not on file   Tobacco Use   • Smoking status: Never Smoker   • Smokeless tobacco: Never Used   Substance and Sexual Activity   • Alcohol use: Yes     Comment: social   • Drug use: No   • Sexual activity: Not on file   Other Topics Concern   • Not on file   Social History Narrative   • Not on file        Current Outpatient Medications:   •  acetaminophen (TYLENOL) 500 MG tablet, Take 500 mg by mouth Every 6 (Six) Hours As Needed for Mild Pain ., Disp: , Rfl:   •  calcitriol (ROCALTROL) 0.5 MCG capsule, Take 0.5 mcg by mouth Every Morning., Disp: , Rfl:   •  CALCIUM PO, Take 3,000 mg by mouth Every Morning.  CHEWABLE TABLETS, Disp: , Rfl:   •  CBD (cannabidiol) oral oil, Take  by mouth., Disp: , Rfl:   •  DULoxetine (CYMBALTA) 30 MG capsule, TAKE 1 CAPSULE BY ORAL ROUTE EVERY DAY, Disp: , Rfl: 1  •  estradiol (ESTRACE) 0.1 MG/GM vaginal cream, Insert 1 applicator into the vagina Every Night., Disp: , Rfl: 6  •  hydroxychloroquine (PLAQUENIL) 200 MG tablet, Take 200 mg by mouth 2 (Two) Times a Day. TAKE 1 TABLET TWICE A DAY, Disp: , Rfl: 3  •  levothyroxine (SYNTHROID, LEVOTHROID) 137 MCG tablet, Take 137 mcg by mouth Daily., Disp: , Rfl:   •  liothyronine (CYTOMEL) 5 MCG tablet, TAKE 1 TABLET BY MOUTH EVERY DAY, Disp: , Rfl:   •  rizatriptan (MAXALT) 10 MG tablet, Take 10 mg by mouth As Needed. TAKE 1 TABLET BY MOUTH ONCE AS NEEDED FOR MIGRAINE FOR UP TO 1 DOSE MAY REPEAT IN 2 HOURS IF NEEDED, Disp: , Rfl: 5  •  vitamin D (ERGOCALCIFEROL) 35689 units capsule capsule, TAKE 1 CAPSULE BY MOUTH EVERY 30 (THIRTY) DAYS, Disp: , Rfl: 1  •  pantoprazole (PROTONIX) 40 MG EC tablet, Take 1 tablet by mouth Daily., Disp: 30 tablet, Rfl: 0     Objective     History of Present Illness Keila complains of abdominal pain and nausea that has been intermittent.  The pain is worse after fatty meals.  She denies any fever, chills, vomiting.  She does have constipation at times and has to take MiraLAX.  She denies any change in her stool color.  She is due for screening colonoscopy and is setting one up.  She had normal lab work last month.    Review of Systems   Constitutional: Negative for fever.   Respiratory: Negative.    Cardiovascular: Negative.    Gastrointestinal: Positive for abdominal pain, constipation and nausea. Negative for diarrhea and vomiting.       Physical Exam   Constitutional: She appears well-developed and well-nourished.   Cardiovascular: Normal rate, regular rhythm and normal heart sounds.   Pulmonary/Chest: Effort normal and breath sounds normal.   Abdominal: Soft. There is tenderness (LLQ).   Bowel sounds increasd    Psychiatric: She has a normal mood and affect. Her behavior is normal. Judgment and thought content normal.   Vitals reviewed.      ASSESSMENT     Problem List Items Addressed This Visit        Nervous and Auditory    Generalized abdominal pain - Primary    Relevant Orders    US Gallbladder          PLAN  Patient Instructions   Symptoms sound more like gallbladder dysfunction.  We'll obtain a gallbladder ultrasound.  Start pantoprazole 40 mg one half-hour before breakfast.    No Follow-up on file.

## 2019-01-16 ENCOUNTER — TELEPHONE (OUTPATIENT)
Dept: GASTROENTEROLOGY | Facility: CLINIC | Age: 59
End: 2019-01-16

## 2019-01-21 ENCOUNTER — HOSPITAL ENCOUNTER (OUTPATIENT)
Dept: ULTRASOUND IMAGING | Facility: HOSPITAL | Age: 59
Discharge: HOME | End: 2019-01-21
Admitting: INTERNAL MEDICINE

## 2019-01-21 PROCEDURE — 76705 ECHO EXAM OF ABDOMEN: CPT

## 2019-01-22 ENCOUNTER — TELEPHONE (OUTPATIENT)
Dept: FAMILY MEDICINE CLINIC | Facility: CLINIC | Age: 59
End: 2019-01-22

## 2019-01-22 NOTE — TELEPHONE ENCOUNTER
I called patient and notified her, per Dr. Chavez, that her Gallbladder US was normal. If her symptoms persist we will order a HIDA scan. Patient understood.

## 2019-02-04 NOTE — TELEPHONE ENCOUNTER
Patient returned phone call. Informed patient that Dr Bowers was concerned about the abdominal pain she was having and felt that she needed an office visit.  Patient stated that she had an US of the GB secondary to the abdominal pain and it was normal. Patient states that her PCP started her on Pantoprazole and she is feeling much better. Informed patient that nurse will inform Dr Bowers.

## 2019-02-04 NOTE — TELEPHONE ENCOUNTER
Spoke with patient regarding Dr Bowers's concerns regarding her recent abdominal pain and that he feels that he needs to see her in the office to evaluate whether she also needs an EGD.  Patient agreeable to an office visit.  New patient office appt scheduled 2/22/19 at 0815 with Dr Bowers.

## 2019-02-04 NOTE — TELEPHONE ENCOUNTER
Spoke with Dr Bowers regarding patient's US GB results and patient being started on pantroprazole.  Dr Bowers questioned whether patient also needs an EGD in addition to a colonoscopy secondary to her recent abdominal discomfort. He feels that patient would benefit from an office visit to determine what tests are needed.  However, if patient just wants to schedule a colonoscopy he can order that test.

## 2019-02-22 ENCOUNTER — OFFICE VISIT (OUTPATIENT)
Dept: GASTROENTEROLOGY | Facility: CLINIC | Age: 59
End: 2019-02-22

## 2019-02-22 VITALS
HEIGHT: 68 IN | BODY MASS INDEX: 24.58 KG/M2 | SYSTOLIC BLOOD PRESSURE: 102 MMHG | DIASTOLIC BLOOD PRESSURE: 70 MMHG | TEMPERATURE: 97.9 F | WEIGHT: 162.2 LBS

## 2019-02-22 DIAGNOSIS — R10.13 DYSPEPSIA: Primary | ICD-10-CM

## 2019-02-22 DIAGNOSIS — Z12.11 ENCOUNTER FOR SCREENING FOR MALIGNANT NEOPLASM OF COLON: ICD-10-CM

## 2019-02-22 PROCEDURE — 99204 OFFICE O/P NEW MOD 45 MIN: CPT | Performed by: INTERNAL MEDICINE

## 2019-02-22 RX ORDER — ERENUMAB-AOOE 70 MG/ML
INJECTION SUBCUTANEOUS
Refills: 5 | COMMUNITY
Start: 2019-02-16 | End: 2021-04-13

## 2019-02-22 RX ORDER — SODIUM CHLORIDE, SODIUM LACTATE, POTASSIUM CHLORIDE, CALCIUM CHLORIDE 600; 310; 30; 20 MG/100ML; MG/100ML; MG/100ML; MG/100ML
30 INJECTION, SOLUTION INTRAVENOUS CONTINUOUS
Status: CANCELLED | OUTPATIENT
Start: 2019-03-07

## 2019-03-07 ENCOUNTER — ANESTHESIA EVENT (OUTPATIENT)
Dept: GASTROENTEROLOGY | Facility: HOSPITAL | Age: 59
End: 2019-03-07

## 2019-03-07 ENCOUNTER — ANESTHESIA (OUTPATIENT)
Dept: GASTROENTEROLOGY | Facility: HOSPITAL | Age: 59
End: 2019-03-07

## 2019-03-07 ENCOUNTER — HOSPITAL ENCOUNTER (OUTPATIENT)
Facility: HOSPITAL | Age: 59
Setting detail: HOSPITAL OUTPATIENT SURGERY
Discharge: HOME | End: 2019-03-07
Attending: INTERNAL MEDICINE | Admitting: INTERNAL MEDICINE

## 2019-03-07 VITALS
RESPIRATION RATE: 16 BRPM | TEMPERATURE: 97.8 F | HEIGHT: 68 IN | SYSTOLIC BLOOD PRESSURE: 110 MMHG | WEIGHT: 157 LBS | HEART RATE: 82 BPM | DIASTOLIC BLOOD PRESSURE: 76 MMHG | BODY MASS INDEX: 23.79 KG/M2 | OXYGEN SATURATION: 99 %

## 2019-03-07 DIAGNOSIS — Z12.11 ENCOUNTER FOR SCREENING FOR MALIGNANT NEOPLASM OF COLON: ICD-10-CM

## 2019-03-07 DIAGNOSIS — R10.13 DYSPEPSIA: ICD-10-CM

## 2019-03-07 PROCEDURE — 88305 TISSUE EXAM BY PATHOLOGIST: CPT | Performed by: INTERNAL MEDICINE

## 2019-03-07 PROCEDURE — 45385 COLONOSCOPY W/LESION REMOVAL: CPT | Performed by: INTERNAL MEDICINE

## 2019-03-07 PROCEDURE — 25010000002 PROPOFOL 10 MG/ML EMULSION: Performed by: ANESTHESIOLOGY

## 2019-03-07 PROCEDURE — 43239 EGD BIOPSY SINGLE/MULTIPLE: CPT | Performed by: INTERNAL MEDICINE

## 2019-03-07 RX ORDER — PROPOFOL 10 MG/ML
VIAL (ML) INTRAVENOUS CONTINUOUS PRN
Status: DISCONTINUED | OUTPATIENT
Start: 2019-03-07 | End: 2019-03-07 | Stop reason: SURG

## 2019-03-07 RX ORDER — LIDOCAINE HYDROCHLORIDE 20 MG/ML
INJECTION, SOLUTION INFILTRATION; PERINEURAL AS NEEDED
Status: DISCONTINUED | OUTPATIENT
Start: 2019-03-07 | End: 2019-03-07 | Stop reason: SURG

## 2019-03-07 RX ORDER — SODIUM CHLORIDE, SODIUM LACTATE, POTASSIUM CHLORIDE, CALCIUM CHLORIDE 600; 310; 30; 20 MG/100ML; MG/100ML; MG/100ML; MG/100ML
30 INJECTION, SOLUTION INTRAVENOUS CONTINUOUS
Status: DISCONTINUED | OUTPATIENT
Start: 2019-03-07 | End: 2019-03-07 | Stop reason: HOSPADM

## 2019-03-07 RX ORDER — SODIUM CHLORIDE, SODIUM LACTATE, POTASSIUM CHLORIDE, CALCIUM CHLORIDE 600; 310; 30; 20 MG/100ML; MG/100ML; MG/100ML; MG/100ML
30 INJECTION, SOLUTION INTRAVENOUS CONTINUOUS PRN
Status: CANCELLED | OUTPATIENT
Start: 2019-03-07

## 2019-03-07 RX ORDER — PROPOFOL 10 MG/ML
VIAL (ML) INTRAVENOUS AS NEEDED
Status: DISCONTINUED | OUTPATIENT
Start: 2019-03-07 | End: 2019-03-07 | Stop reason: SURG

## 2019-03-07 RX ADMIN — SODIUM CHLORIDE, POTASSIUM CHLORIDE, SODIUM LACTATE AND CALCIUM CHLORIDE 30 ML/HR: 600; 310; 30; 20 INJECTION, SOLUTION INTRAVENOUS at 08:23

## 2019-03-07 RX ADMIN — PROPOFOL 140 MCG/KG/MIN: 10 INJECTION, EMULSION INTRAVENOUS at 08:34

## 2019-03-07 RX ADMIN — PROPOFOL 20 MG: 10 INJECTION, EMULSION INTRAVENOUS at 08:48

## 2019-03-07 RX ADMIN — LIDOCAINE HYDROCHLORIDE 40 MG: 20 INJECTION, SOLUTION INFILTRATION; PERINEURAL at 08:34

## 2019-03-07 RX ADMIN — SODIUM CHLORIDE, POTASSIUM CHLORIDE, SODIUM LACTATE AND CALCIUM CHLORIDE: 600; 310; 30; 20 INJECTION, SOLUTION INTRAVENOUS at 08:25

## 2019-03-07 RX ADMIN — PROPOFOL 100 MG: 10 INJECTION, EMULSION INTRAVENOUS at 08:34

## 2019-03-07 NOTE — ANESTHESIA POSTPROCEDURE EVALUATION
"Patient: Keila Caldwell    Procedure Summary     Date:  03/07/19 Room / Location:  [facility] ENDOSCOPY 10 /  [facility]    Anesthesia Start:  0832 Anesthesia Stop:  0906    Procedures:       ESOPHAGOGASTRODUODENOSCOPY WITH BIOPSY (N/A Esophagus)      COLONOSCOPY WITH Cold POLYPECTOMY (N/A ) Diagnosis:       Dyspepsia      Encounter for screening for malignant neoplasm of colon      (Dyspepsia [R10.13])      (Encounter for screening for malignant neoplasm of colon [Z12.11])    Surgeon:  Aravind Bowers MD Provider:  Tyson Crum MD    Anesthesia Type:  MAC ASA Status:  2          Anesthesia Type: MAC  Last vitals  BP   112/74 (03/07/19 0914)   Temp   36.6 °C (97.8 °F) (03/07/19 0808)   Pulse   86 (03/07/19 0914)   Resp   16 (03/07/19 0914)     SpO2   99 % (03/07/19 0914)     Post Anesthesia Care and Evaluation    Patient location during evaluation: bedside  Patient participation: complete - patient participated  Level of consciousness: awake and alert  Pain management: adequate  Anesthetic complications: No anesthetic complications    Cardiovascular status: acceptable  Respiratory status: acceptable  Hydration status: acceptable    Comments: /74   Pulse 86   Temp 36.6 °C (97.8 °F) (Oral)   Resp 16   Ht 172.7 cm (68\")   Wt 71.2 kg (157 lb)   SpO2 99%   BMI 23.87 kg/m²         "

## 2019-03-07 NOTE — ANESTHESIA PREPROCEDURE EVALUATION
Anesthesia Evaluation     Patient summary reviewed and Nursing notes reviewed   no history of anesthetic complications:  NPO Solid Status: > 8 hours  NPO Liquid Status: > 2 hours           Airway   Mallampati: II  Dental      Pulmonary - negative pulmonary ROS and normal exam   Cardiovascular - negative cardio ROS and normal exam        Neuro/Psych  (+) headaches,     GI/Hepatic/Renal/Endo    (+)  GERD,  hypothyroidism,     Musculoskeletal     Abdominal    Substance History      OB/GYN          Other                          Anesthesia Plan    ASA 2     MAC     Anesthetic plan, all risks, benefits, and alternatives have been provided, discussed and informed consent has been obtained with: patient.

## 2019-03-08 LAB
CYTO UR: NORMAL
LAB AP CASE REPORT: NORMAL
LAB AP DIAGNOSIS COMMENT: NORMAL
PATH REPORT.FINAL DX SPEC: NORMAL
PATH REPORT.GROSS SPEC: NORMAL

## 2019-03-11 RX ORDER — PANTOPRAZOLE SODIUM 40 MG/1
TABLET, DELAYED RELEASE ORAL
Qty: 30 TABLET | Refills: 5 | Status: SHIPPED | OUTPATIENT
Start: 2019-03-11 | End: 2019-07-24 | Stop reason: SDUPTHER

## 2019-03-18 ENCOUNTER — OFFICE VISIT (OUTPATIENT)
Dept: FAMILY MEDICINE CLINIC | Facility: CLINIC | Age: 59
End: 2019-03-18

## 2019-03-18 VITALS
OXYGEN SATURATION: 96 % | SYSTOLIC BLOOD PRESSURE: 102 MMHG | WEIGHT: 159 LBS | HEIGHT: 68 IN | BODY MASS INDEX: 24.1 KG/M2 | HEART RATE: 94 BPM | TEMPERATURE: 98.9 F | RESPIRATION RATE: 16 BRPM | DIASTOLIC BLOOD PRESSURE: 60 MMHG

## 2019-03-18 DIAGNOSIS — J06.9 VIRAL UPPER RESPIRATORY TRACT INFECTION: ICD-10-CM

## 2019-03-18 DIAGNOSIS — J02.9 PHARYNGITIS, UNSPECIFIED ETIOLOGY: Primary | ICD-10-CM

## 2019-03-18 LAB
EXPIRATION DATE: NORMAL
INTERNAL CONTROL: NORMAL
Lab: NORMAL
S PYO AG THROAT QL: NEGATIVE

## 2019-03-18 PROCEDURE — 87880 STREP A ASSAY W/OPTIC: CPT | Performed by: INTERNAL MEDICINE

## 2019-03-18 PROCEDURE — 99213 OFFICE O/P EST LOW 20 MIN: CPT | Performed by: INTERNAL MEDICINE

## 2019-03-18 NOTE — PATIENT INSTRUCTIONS
Strep test today is negative.  Symptoms are most consistent with a viral upper respiratory infection.  Suggest pseudoephedrine 30 mg decongestant tablets and possibly using some Afrin at bedtime.  Drink plenty of fluids.  If symptoms progress 10-14 days start cefuroxime as prescribed.

## 2019-03-18 NOTE — PROGRESS NOTES
Subjective   Keila Caldwell is a 59 y.o. female. Patient is here today for   Chief Complaint   Patient presents with   • Headache     x 1 day    • Sore Throat          Vitals:    03/18/19 1248   BP: 102/60   Pulse: 94   Resp: 16   Temp: 98.9 °F (37.2 °C)   SpO2: 96%     The following portions of the patient's history were reviewed and updated as appropriate: allergies, current medications, past family history, past medical history, past social history, past surgical history and problem list.    Past Medical History:   Diagnosis Date   • Arthritis    • GERD (gastroesophageal reflux disease)    • Hypothyroidism    • Left hip pain    • Migraines     ON MED   • UTI (urinary tract infection)     CURRENTLY FINISHING AB FOR A UTI FOUND COUPLE WEEKS AGO      Allergies   Allergen Reactions   • Naproxen Other (See Comments)     Kidney dysfunction   • Nsaids Other (See Comments)     KIDNEY DYSFUNCTION      Social History     Socioeconomic History   • Marital status:      Spouse name: Not on file   • Number of children: Not on file   • Years of education: Not on file   • Highest education level: Not on file   Social Needs   • Financial resource strain: Not on file   • Food insecurity - worry: Not on file   • Food insecurity - inability: Not on file   • Transportation needs - medical: Not on file   • Transportation needs - non-medical: Not on file   Occupational History   • Not on file   Tobacco Use   • Smoking status: Never Smoker   • Smokeless tobacco: Never Used   Substance and Sexual Activity   • Alcohol use: Yes     Types: 1 Glasses of wine per week     Comment: once a month or so   • Drug use: No   • Sexual activity: Not on file   Other Topics Concern   • Not on file   Social History Narrative   • Not on file        Current Outpatient Medications:   •  acetaminophen (TYLENOL) 500 MG tablet, Take 500 mg by mouth Every 6 (Six) Hours As Needed for Mild Pain ., Disp: , Rfl:   •  AIMOVIG 70 MG/ML prefilled syringe,  INJECT 1 ML INTO THE SKIN EVERY 30 (THIRTY) DAYS AS INSTRUCTED/EDUCATED., Disp: , Rfl: 5  •  calcitriol (ROCALTROL) 0.5 MCG capsule, Take 0.5 mcg by mouth Every Morning., Disp: , Rfl:   •  CALCIUM PO, Take 3,000 mg by mouth Every Morning. CHEWABLE TABLETS, Disp: , Rfl:   •  CBD (cannabidiol) oral oil, Take  by mouth., Disp: , Rfl:   •  DULoxetine (CYMBALTA) 30 MG capsule, TAKE 1 CAPSULE BY ORAL ROUTE EVERY DAY, Disp: , Rfl: 1  •  estradiol (ESTRACE) 0.1 MG/GM vaginal cream, Insert 1 applicator into the vagina Every Night., Disp: , Rfl: 6  •  hydroxychloroquine (PLAQUENIL) 200 MG tablet, Take 200 mg by mouth 2 (Two) Times a Day. TAKE 1 TABLET TWICE A DAY, Disp: , Rfl: 3  •  levothyroxine (SYNTHROID, LEVOTHROID) 137 MCG tablet, Take 137 mcg by mouth Daily., Disp: , Rfl:   •  liothyronine (CYTOMEL) 5 MCG tablet, TAKE 1 TABLET BY MOUTH EVERY DAY, Disp: , Rfl:   •  pantoprazole (PROTONIX) 40 MG EC tablet, TAKE 1 TABLET BY MOUTH EVERY DAY, Disp: 30 tablet, Rfl: 5  •  rizatriptan (MAXALT) 10 MG tablet, Take 10 mg by mouth As Needed. TAKE 1 TABLET BY MOUTH ONCE AS NEEDED FOR MIGRAINE FOR UP TO 1 DOSE MAY REPEAT IN 2 HOURS IF NEEDED, Disp: , Rfl: 5  •  vitamin D (ERGOCALCIFEROL) 48307 units capsule capsule, TAKE 1 CAPSULE BY MOUTH EVERY 30 (THIRTY) DAYS, Disp: , Rfl: 1     Objective     History of Present Illness Keila complains of a sore throat, nasal congestion, headache, and myalgias a started yesterday.  She has been taking Tylenol and a multisymptom cold reliever.  She denies any cough.    Review of Systems   Constitutional: Positive for fatigue and fever.   HENT: Positive for congestion and sore throat.    Respiratory: Positive for chest tightness. Negative for cough.    Neurological: Positive for headaches.       Physical Exam   Constitutional: She appears well-developed and well-nourished.   HENT:   Nose: Mucosal edema present.   Mouth/Throat: Posterior oropharyngeal erythema present. No oropharyngeal exudate.    Pulmonary/Chest: Effort normal and breath sounds normal.   Lymphadenopathy:     She has no cervical adenopathy.   Vitals reviewed.      ASSESSMENT     Problem List Items Addressed This Visit        Respiratory    Pharyngitis - Primary    Relevant Orders    POC Rapid Strep A (Completed)    Viral upper respiratory tract infection          PLAN  Patient Instructions   Strep test today is negative.  Symptoms are most consistent with a viral upper respiratory infection.  Suggest pseudoephedrine 30 mg decongestant tablets and possibly using some Afrin at bedtime.  Drink plenty of fluids.  If symptoms progress 10-14 days start cefuroxime as prescribed.    No Follow-up on file.

## 2019-04-10 ENCOUNTER — TELEPHONE (OUTPATIENT)
Dept: GASTROENTEROLOGY | Facility: CLINIC | Age: 59
End: 2019-04-10

## 2019-04-10 NOTE — TELEPHONE ENCOUNTER
----- Message from Aravind Bowers MD sent at 3/15/2019 12:11 PM EDT -----  Mild inflammation on EGD  Colon polyp TA -recall 5 yrs  O/V 6 weeks

## 2019-04-10 NOTE — TELEPHONE ENCOUNTER
Results sent to pt via a message on [employer].      Health Maintenance updated to reflect C/S due 3/2024

## 2019-04-15 NOTE — TELEPHONE ENCOUNTER
Pt has not read her [employer] message.    Called pt... No answer after multiple rings; left a message for call back.

## 2019-04-16 NOTE — TELEPHONE ENCOUNTER
Returned patient's phone call. She states she read MY CHART this morning and did not have any questions.

## 2019-07-24 RX ORDER — PANTOPRAZOLE SODIUM 40 MG/1
TABLET, DELAYED RELEASE ORAL
Qty: 90 TABLET | Refills: 1 | Status: SHIPPED | OUTPATIENT
Start: 2019-07-24 | End: 2020-02-04

## 2019-10-01 DIAGNOSIS — R11.0 NAUSEA: ICD-10-CM

## 2019-10-01 DIAGNOSIS — R14.0 BLOATING: ICD-10-CM

## 2019-10-01 DIAGNOSIS — K59.09 OTHER CONSTIPATION: Primary | ICD-10-CM

## 2019-10-01 DIAGNOSIS — R10.84 GENERALIZED ABDOMINAL PAIN: ICD-10-CM

## 2019-10-25 ENCOUNTER — OFFICE VISIT (OUTPATIENT)
Dept: GASTROENTEROLOGY | Facility: CLINIC | Age: 59
End: 2019-10-25

## 2019-10-25 VITALS
DIASTOLIC BLOOD PRESSURE: 70 MMHG | HEIGHT: 68 IN | SYSTOLIC BLOOD PRESSURE: 102 MMHG | BODY MASS INDEX: 24.67 KG/M2 | TEMPERATURE: 98.3 F | WEIGHT: 162.8 LBS

## 2019-10-25 DIAGNOSIS — K59.00 CONSTIPATION, UNSPECIFIED CONSTIPATION TYPE: Primary | ICD-10-CM

## 2019-10-25 DIAGNOSIS — K21.00 GASTROESOPHAGEAL REFLUX DISEASE WITH ESOPHAGITIS: ICD-10-CM

## 2019-10-25 PROCEDURE — 99214 OFFICE O/P EST MOD 30 MIN: CPT | Performed by: NURSE PRACTITIONER

## 2019-10-25 RX ORDER — TOPIRAMATE 50 MG/1
50 TABLET, FILM COATED ORAL 2 TIMES DAILY
COMMUNITY
End: 2020-11-23

## 2019-10-25 RX ORDER — MAGNESIUM GLUCONATE 27 MG(500)
500 TABLET ORAL DAILY
COMMUNITY
End: 2020-02-25

## 2019-10-25 NOTE — PROGRESS NOTES
Chief Complaint   Patient presents with   • Constipation     HPI    Keila Caldwell is a  59 y.o. female here for a follow up visit for constipation.    This patient has a history of thyroidectomy 10 years ago, hypocalcemia, GERD, migraines, and urinary tract infections.  She follows with Dr. Bowers, new to me.  Last seen the office in the spring for complaints of upper abdominal pain with negative right upper quadrant ultrasound.  She subsequently underwent bidirectional endoscopic evaluation for symptoms on 3/7/2019 which I reviewed:    EGD-- grade a esophagitis, normal stomach, normal duodenum.  Colonoscopy-- normal ileum, polyp.  Pathology-- mild inflammation on upper biopsy.  Colon polyp positive for TA.  Recall 5 years.    On visit today her primary complaint is constipation.  She has been prone to constipation since high school with episodes of normal bowel movements in between.  Her current episode of constipation is lasted several months.  She is tried over-the-counter MiraLAX and suppositories without improvement.  Her bowels are moving about once a week.  There is associated lower abdominal cramping and nausea but no rectal bleeding.    She continues on pantoprazole 40 mg once daily to manage GERD.  She denies breakthrough symptoms, vomiting, or dysphagia.  Appetite is good.  Her weight is stable.    Past Medical History:   Diagnosis Date   • Arthritis    • GERD (gastroesophageal reflux disease)    • Hypothyroidism    • Left hip pain    • Migraines     ON MED   • UTI (urinary tract infection)     CURRENTLY FINISHING AB FOR A UTI FOUND COUPLE WEEKS AGO       Past Surgical History:   Procedure Laterality Date   • COLONOSCOPY  approx 2008    normal per pt    • COLONOSCOPY W/ POLYPECTOMY N/A 3/7/2019    One 3 mm polyp in the transverse colon. Path:Mild chronic inflammation.    • ENDOSCOPY N/A 3/7/2019    LA Grade A esophagitis   • FOOT SURGERY Right     CYST   • HYSTERECTOMY     • TONSILLECTOMY     • TOTAL  HIP ARTHROPLASTY Left 3/16/2018    Procedure: LT TOTAL HIP ARTHROPLASTY ANTERIOR WITH HANA TABLE;  Surgeon: Sergio Balderas MD;  Location: [facility];  Service: Orthopedics       Scheduled Meds:  Outpatient Encounter Medications as of 10/25/2019   Medication Sig Dispense Refill   • acetaminophen (TYLENOL) 500 MG tablet Take 500 mg by mouth Every 6 (Six) Hours As Needed for Mild Pain .     • AIMOVIG 70 MG/ML prefilled syringe INJECT 1 ML INTO THE SKIN EVERY 30 (THIRTY) DAYS AS INSTRUCTED/EDUCATED.  5   • calcitriol (ROCALTROL) 0.5 MCG capsule Take 0.5 mcg by mouth 3 (Three) Times a Day.     • CALCIUM PO Take 2,000 mg by mouth 3 (Three) Times a Day. CHEWABLE TABLETS     • Calcium Polycarbophil (FIBERCON PO) Take  by mouth Daily.     • DULoxetine (CYMBALTA) 30 MG capsule TAKE 1 CAPSULE BY ORAL ROUTE EVERY DAY  1   • estradiol (ESTRACE) 0.1 MG/GM vaginal cream Insert 1 applicator into the vagina Every Night.  6   • levothyroxine (SYNTHROID, LEVOTHROID) 137 MCG tablet Take 0.88 mcg by mouth Daily.     • liothyronine (CYTOMEL) 5 MCG tablet TAKE 1 TABLET BY MOUTH EVERY DAY     • magnesium gluconate (MAGONATE) 500 MG tablet Take 500 mg by mouth Daily.     • pantoprazole (PROTONIX) 40 MG EC tablet TAKE 1 TABLET BY MOUTH EVERY DAY 90 tablet 1   • rizatriptan (MAXALT) 10 MG tablet Take 10 mg by mouth As Needed. TAKE 1 TABLET BY MOUTH ONCE AS NEEDED FOR MIGRAINE FOR UP TO 1 DOSE MAY REPEAT IN 2 HOURS IF NEEDED  5   • topiramate (TOPAMAX) 50 MG tablet Take 50 mg by mouth 2 (Two) Times a Day.     • vitamin D (ERGOCALCIFEROL) 25713 units capsule capsule TAKE 1 CAPSULE BY MOUTH EVERY 30 (THIRTY) DAYS  1   • CBD (cannabidiol) oral oil Take  by mouth.     • hydroxychloroquine (PLAQUENIL) 200 MG tablet Take 200 mg by mouth 2 (Two) Times a Day. TAKE 1 TABLET TWICE A DAY  3     No facility-administered encounter medications on file as of 10/25/2019.        Continuous Infusions:  No current facility-administered medications  for this visit.     PRN Meds:.    Allergies   Allergen Reactions   • Naproxen Other (See Comments)     Kidney dysfunction   • Nsaids Other (See Comments)     KIDNEY DYSFUNCTION       Social History     Socioeconomic History   • Marital status:      Spouse name: Not on file   • Number of children: Not on file   • Years of education: Not on file   • Highest education level: Not on file   Tobacco Use   • Smoking status: Never Smoker   • Smokeless tobacco: Never Used   Substance and Sexual Activity   • Alcohol use: Yes     Types: 1 Glasses of wine per week     Comment: once a month or so   • Drug use: No       Family History   Problem Relation Age of Onset   • No Known Problems Mother    • Stomach cancer Father    • No Known Problems Sister    • No Known Problems Brother    • No Known Problems Son    • No Known Problems Daughter    • No Known Problems Maternal Grandmother    • No Known Problems Maternal Grandfather    • No Known Problems Paternal Grandmother    • No Known Problems Paternal Grandfather    • No Known Problems Cousin    • Malig Hyperthermia Neg Hx        Review of Systems   Constitutional: Negative for activity change, appetite change, fatigue, fever and unexpected weight change.   HENT: Negative for trouble swallowing.    Respiratory: Negative for apnea, cough, choking, chest tightness, shortness of breath and wheezing.    Cardiovascular: Negative for chest pain, palpitations and leg swelling.   Gastrointestinal: Positive for constipation. Negative for abdominal distention, abdominal pain, anal bleeding, blood in stool, diarrhea, nausea, rectal pain and vomiting.       Vitals:    10/25/19 0845   BP: 102/70   Temp: 98.3 °F (36.8 °C)       Physical Exam   Constitutional: She is oriented to person, place, and time. She appears well-developed and well-nourished.   Eyes: Pupils are equal, round, and reactive to light.   Cardiovascular: Normal rate, regular rhythm and normal heart sounds.    Pulmonary/Chest: Effort normal and breath sounds normal. No respiratory distress. She has no wheezes.   Abdominal: Soft. Bowel sounds are normal. She exhibits no distension and no mass. There is no tenderness. There is no guarding. No hernia.   Musculoskeletal: Normal range of motion.   Neurological: She is alert and oriented to person, place, and time.   Skin: Skin is warm and dry. Capillary refill takes less than 2 seconds.   Psychiatric: She has a normal mood and affect. Her behavior is normal.       No images are attached to the encounter.    Keila was seen today for constipation.    Diagnoses and all orders for this visit:    Constipation, unspecified constipation type    Gastroesophageal reflux disease with esophagitis      Assessment/plan    59-year-old female seen today in follow-up with complaints of constipation was a long-standing issue for her worse lately.  Recommend trial of Linzess 72 mcg taken once daily. We talked about treatment for constipation including increased physical activity, adequate rest, adequate hydration with 6-8 glasses of water per day, and high fiber diet.  She is to call us in 2 weeks with an update of her symptoms.    Stable GERD, will continue PPI therapy.  I did caution her against NSAID use.  We also discussed GERD diet lifestyle modifications.    Return to clinic 3 months or sooner if needed.

## 2019-11-07 ENCOUNTER — TELEPHONE (OUTPATIENT)
Dept: GASTROENTEROLOGY | Facility: CLINIC | Age: 59
End: 2019-11-07

## 2019-11-07 NOTE — TELEPHONE ENCOUNTER
----- Message from No Hook sent at 11/7/2019 11:02 AM EST -----  Regarding: linzess  Contact: [phone]  Pt says the Linzess helped with the bloating and pain but did not help with her constipation.

## 2019-11-07 NOTE — TELEPHONE ENCOUNTER
Patient called, advised as per Rocio's note. Patient states she would prefer a prescription be called to her pharmacy rather than samples. Medication e-scribed to patient's pharmacy.

## 2019-11-07 NOTE — TELEPHONE ENCOUNTER
I started her on the lowest dose.  If she is willing we can increase Linzess to 145 mcg/day and see how she does.  She can come by  samples.

## 2019-11-18 ENCOUNTER — APPOINTMENT (OUTPATIENT)
Dept: WOMENS IMAGING | Facility: HOSPITAL | Age: 59
End: 2019-11-18

## 2019-11-18 PROCEDURE — 77063 BREAST TOMOSYNTHESIS BI: CPT | Performed by: RADIOLOGY

## 2019-11-18 PROCEDURE — 77067 SCR MAMMO BI INCL CAD: CPT | Performed by: RADIOLOGY

## 2019-12-17 ENCOUNTER — OFFICE VISIT (OUTPATIENT)
Dept: GASTROENTEROLOGY | Facility: CLINIC | Age: 59
End: 2019-12-17

## 2019-12-17 VITALS
WEIGHT: 160 LBS | HEIGHT: 68 IN | DIASTOLIC BLOOD PRESSURE: 80 MMHG | SYSTOLIC BLOOD PRESSURE: 116 MMHG | BODY MASS INDEX: 24.25 KG/M2 | TEMPERATURE: 98 F

## 2019-12-17 DIAGNOSIS — K21.9 GASTROESOPHAGEAL REFLUX DISEASE, ESOPHAGITIS PRESENCE NOT SPECIFIED: ICD-10-CM

## 2019-12-17 DIAGNOSIS — K59.00 CONSTIPATION, UNSPECIFIED CONSTIPATION TYPE: Primary | ICD-10-CM

## 2019-12-17 PROCEDURE — 99214 OFFICE O/P EST MOD 30 MIN: CPT | Performed by: NURSE PRACTITIONER

## 2019-12-17 NOTE — PROGRESS NOTES
Chief Complaint   Patient presents with   • Follow-up   • Constipation     HPI    Keila Caldwell is a  59 y.o. female here for a follow up visit for constipation.  This patient follows with Dr. Bowers known to me. This patient has a history of thyroidectomy 10 years ago, hypocalcemia, GERD, migraines, and urinary tract infections.     She was started on trial of Linzess on last office visit.  Initially Linzess 72 mcg worked well but then gradually wore off, increased to 145 mcg a day and again worked well for a couple weeks and then gradually wore off.  Currently having a bowel movement twice a week.  Linzess did resolve excessive gas and bloating.  Denies abdominal pain or rectal bleeding.    When constipation is prolonged she will have episodes of nausea which did resolve on Linzess.  Denies acid reflux.  She feels like GERD is well controlled on once daily PPI therapy.  No dysphagia.    Data reviewed:    3/7/2019 EGD-grade a esophagitis, normal stomach, normal duodenum.  Colonoscopy normal ileum, polyp.  Pathology-mild inflammation on upper biopsy.  Colon polyp positive for TA.  Recall 5 years.  Past Medical History:   Diagnosis Date   • Arthritis    • GERD (gastroesophageal reflux disease)    • Hypothyroidism    • Left hip pain    • Migraines     ON MED   • UTI (urinary tract infection)     CURRENTLY FINISHING AB FOR A UTI FOUND COUPLE WEEKS AGO       Past Surgical History:   Procedure Laterality Date   • COLONOSCOPY  approx 2008    normal per pt    • COLONOSCOPY W/ POLYPECTOMY N/A 3/7/2019    One 3 mm polyp in the transverse colon. Path:Mild chronic inflammation.    • ENDOSCOPY N/A 3/7/2019    LA Grade A esophagitis   • FOOT SURGERY Right     CYST   • HYSTERECTOMY     • TONSILLECTOMY     • TOTAL HIP ARTHROPLASTY Left 3/16/2018    Procedure: LT TOTAL HIP ARTHROPLASTY ANTERIOR WITH HANA TABLE;  Surgeon: Sergio Balderas MD;  Location: [facility];  Service: Orthopedics       Scheduled  Meds:  Outpatient Encounter Medications as of 12/17/2019   Medication Sig Dispense Refill   • acetaminophen (TYLENOL) 500 MG tablet Take 500 mg by mouth Every 6 (Six) Hours As Needed for Mild Pain .     • AIMOVIG 70 MG/ML prefilled syringe INJECT 1 ML INTO THE SKIN EVERY 30 (THIRTY) DAYS AS INSTRUCTED/EDUCATED.  5   • calcitriol (ROCALTROL) 0.5 MCG capsule Take 0.5 mcg by mouth 2 (Two) Times a Day.     • CALCIUM PO Take 2,000 mg by mouth 3 (Three) Times a Day. CHEWABLE TABLETS     • CBD (cannabidiol) oral oil Take  by mouth.     • DULoxetine (CYMBALTA) 30 MG capsule TAKE 1 CAPSULE BY ORAL ROUTE EVERY DAY  1   • estradiol (ESTRACE) 0.1 MG/GM vaginal cream Insert 1 applicator into the vagina 2 (Two) Times a Week.  6   • levothyroxine (SYNTHROID, LEVOTHROID) 137 MCG tablet Take 0.88 mcg by mouth Daily.     • linaclotide (LINZESS) 145 MCG capsule capsule Take 1 capsule by mouth Every Morning Before Breakfast. 30 capsule 11   • liothyronine (CYTOMEL) 5 MCG tablet TAKE 1 TABLET BY MOUTH EVERY DAY     • pantoprazole (PROTONIX) 40 MG EC tablet TAKE 1 TABLET BY MOUTH EVERY DAY 90 tablet 1   • rizatriptan (MAXALT) 10 MG tablet Take 10 mg by mouth As Needed. TAKE 1 TABLET BY MOUTH ONCE AS NEEDED FOR MIGRAINE FOR UP TO 1 DOSE MAY REPEAT IN 2 HOURS IF NEEDED  5   • vitamin D (ERGOCALCIFEROL) 56228 units capsule capsule TAKE 1 CAPSULE BY MOUTH EVERY 30 (THIRTY) DAYS  1   • Calcium Polycarbophil (FIBERCON PO) Take  by mouth Daily.     • hydroxychloroquine (PLAQUENIL) 200 MG tablet Take 200 mg by mouth 2 (Two) Times a Day. TAKE 1 TABLET TWICE A DAY  3   • magnesium gluconate (MAGONATE) 500 MG tablet Take 500 mg by mouth Daily.     • NATPARA 50 MCG cartridge      • topiramate (TOPAMAX) 50 MG tablet Take 50 mg by mouth 2 (Two) Times a Day.     • [DISCONTINUED] cefdinir (OMNICEF) 300 MG capsule Take 1 capsule by mouth 2 (Two) Times a Day. 20 capsule 0     No facility-administered encounter medications on file as of 12/17/2019.         Continuous Infusions:  No current facility-administered medications for this visit.     PRN Meds:.    Allergies   Allergen Reactions   • Naproxen Other (See Comments)     Kidney dysfunction   • Nsaids Other (See Comments)     KIDNEY DYSFUNCTION       Social History     Socioeconomic History   • Marital status:      Spouse name: Not on file   • Number of children: Not on file   • Years of education: Not on file   • Highest education level: Not on file   Tobacco Use   • Smoking status: Never Smoker   • Smokeless tobacco: Never Used   Substance and Sexual Activity   • Alcohol use: Yes     Alcohol/week: 1.0 standard drinks     Types: 1 Glasses of wine per week     Comment: once a month or so   • Drug use: No   • Sexual activity: Not Currently     Partners: Male     Birth control/protection: None       Family History   Problem Relation Age of Onset   • No Known Problems Mother    • Stomach cancer Father    • Colon polyps Father    • No Known Problems Sister    • No Known Problems Brother    • No Known Problems Son    • No Known Problems Daughter    • No Known Problems Maternal Grandmother    • No Known Problems Maternal Grandfather    • No Known Problems Paternal Grandmother    • No Known Problems Paternal Grandfather    • No Known Problems Cousin    • Malig Hyperthermia Neg Hx        Review of Systems   Constitutional: Negative for activity change, appetite change, fatigue, fever and unexpected weight change.   HENT: Negative for trouble swallowing.    Respiratory: Negative for apnea, cough, choking, chest tightness, shortness of breath and wheezing.    Cardiovascular: Negative for chest pain, palpitations and leg swelling.   Gastrointestinal: Positive for constipation. Negative for abdominal distention, abdominal pain, anal bleeding, blood in stool, diarrhea, nausea, rectal pain and vomiting.       Vitals:    12/17/19 1456   BP: 116/80   Temp: 98 °F (36.7 °C)       Physical Exam   Constitutional: She is  oriented to person, place, and time. She appears well-developed and well-nourished.   Eyes: Pupils are equal, round, and reactive to light.   Cardiovascular: Normal rate, regular rhythm and normal heart sounds.   Pulmonary/Chest: Effort normal and breath sounds normal. No respiratory distress. She has no wheezes.   Abdominal: Soft. Bowel sounds are normal. She exhibits no distension and no mass. There is no tenderness. There is no guarding. No hernia.   Musculoskeletal: Normal range of motion.   Neurological: She is alert and oriented to person, place, and time.   Skin: Skin is warm and dry. Capillary refill takes less than 2 seconds.   Psychiatric: She has a normal mood and affect. Her behavior is normal.     Keila was seen today for follow-up and constipation.    Diagnoses and all orders for this visit:    Constipation, unspecified constipation type    Gastroesophageal reflux disease, esophagitis presence not specified    Assessment/plan    Continued issues with constipation.  Responded well to Linzess initially but wore off.  Moving forward recommend trial of Motegrity 2mg/day. Consider sitz marker study in the future rule out pelvic floor dysfunction.  We also talked about treatment for constipation including increased physical activity, adequate rest, adequate hydration with 6-8 glasses of water per day, and high fiber diet.     Stable GERD, continue PPI therapy.    Patient to call us with an update of symptoms in 2 weeks otherwise we will see her back in 3 months.

## 2020-02-04 RX ORDER — PANTOPRAZOLE SODIUM 40 MG/1
TABLET, DELAYED RELEASE ORAL
Qty: 30 TABLET | Refills: 0 | Status: SHIPPED | OUTPATIENT
Start: 2020-02-04 | End: 2020-03-04

## 2020-02-18 ENCOUNTER — TRANSCRIBE ORDERS (OUTPATIENT)
Dept: ADMINISTRATIVE | Facility: HOSPITAL | Age: 60
End: 2020-02-18

## 2020-02-18 ENCOUNTER — HOSPITAL ENCOUNTER (OUTPATIENT)
Dept: GENERAL RADIOLOGY | Facility: HOSPITAL | Age: 60
Discharge: HOME | End: 2020-02-18
Admitting: NURSE PRACTITIONER

## 2020-02-18 DIAGNOSIS — M17.0 PRIMARY OSTEOARTHRITIS OF BOTH KNEES: Primary | ICD-10-CM

## 2020-02-18 DIAGNOSIS — M17.0 PRIMARY OSTEOARTHRITIS OF BOTH KNEES: ICD-10-CM

## 2020-02-18 PROCEDURE — 73562 X-RAY EXAM OF KNEE 3: CPT

## 2020-02-25 ENCOUNTER — HOSPITAL ENCOUNTER (OUTPATIENT)
Dept: GENERAL RADIOLOGY | Facility: HOSPITAL | Age: 60
Discharge: HOME | End: 2020-02-25
Admitting: NURSE PRACTITIONER

## 2020-02-25 ENCOUNTER — HOSPITAL ENCOUNTER (OUTPATIENT)
Dept: GENERAL RADIOLOGY | Facility: HOSPITAL | Age: 60
Discharge: HOME | End: 2020-02-25

## 2020-02-25 ENCOUNTER — OFFICE VISIT (OUTPATIENT)
Dept: FAMILY MEDICINE CLINIC | Facility: CLINIC | Age: 60
End: 2020-02-25

## 2020-02-25 VITALS
RESPIRATION RATE: 18 BRPM | SYSTOLIC BLOOD PRESSURE: 108 MMHG | HEART RATE: 78 BPM | HEIGHT: 68 IN | DIASTOLIC BLOOD PRESSURE: 70 MMHG | TEMPERATURE: 98.3 F | OXYGEN SATURATION: 98 % | BODY MASS INDEX: 25.13 KG/M2 | WEIGHT: 165.8 LBS

## 2020-02-25 DIAGNOSIS — M54.41 ACUTE RIGHT-SIDED LOW BACK PAIN WITH RIGHT-SIDED SCIATICA: ICD-10-CM
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
## 2020-02-25 DIAGNOSIS — M25.551 RIGHT HIP PAIN: ICD-10-CM

## 2020-02-25 DIAGNOSIS — M54.41 ACUTE RIGHT-SIDED LOW BACK PAIN WITH RIGHT-SIDED SCIATICA: Primary | ICD-10-CM

## 2020-02-25 PROBLEM — E83.42 HYPOMAGNESEMIA: Status: ACTIVE | Noted: 2019-09-16

## 2020-02-25 PROBLEM — E55.9 VITAMIN D DEFICIENCY: Status: ACTIVE | Noted: 2019-04-10

## 2020-02-25 PROBLEM — R14.0 ABDOMINAL BLOATING: Status: ACTIVE | Noted: 2018-12-13

## 2020-02-25 PROCEDURE — 73502 X-RAY EXAM HIP UNI 2-3 VIEWS: CPT

## 2020-02-25 PROCEDURE — 99213 OFFICE O/P EST LOW 20 MIN: CPT | Performed by: NURSE PRACTITIONER

## 2020-02-25 PROCEDURE — 72110 X-RAY EXAM L-2 SPINE 4/>VWS: CPT

## 2020-02-25 RX ORDER — CYCLOBENZAPRINE HCL 5 MG
5 TABLET ORAL 2 TIMES DAILY PRN
Qty: 12 TABLET | Refills: 0 | Status: SHIPPED | OUTPATIENT
Start: 2020-02-25 | End: 2021-04-13

## 2020-02-25 RX ORDER — METHYLPREDNISOLONE 4 MG/1
TABLET ORAL
Qty: 1 EACH | Refills: 0 | Status: SHIPPED | OUTPATIENT
Start: 2020-02-25 | End: 2020-11-23

## 2020-02-25 RX ORDER — FOLIC ACID 0.8 MG
1 TABLET ORAL DAILY
COMMUNITY

## 2020-02-25 RX ORDER — LEVOTHYROXINE SODIUM 88 UG/1
88 TABLET ORAL DAILY
COMMUNITY
Start: 2020-01-03

## 2020-02-25 RX ORDER — LIOTHYRONINE SODIUM 5 UG/1
5 TABLET ORAL DAILY
COMMUNITY

## 2020-02-25 NOTE — PROGRESS NOTES
Subjective     Keila Caldwell is a 60 y.o.. female.     Pt stating she was on vacation at the end of December and fell out of hammock, hitting low back on ground. Pt stating she never went and had it checked out. Pt stating she thought it was going to get better but hasn't. Pt stating that for the past few weeks it has been getting worse. Pt stating it is difficult to walk after sitting for long periods of time. Pt with history of osteo-arthritis through out body and sees an orthapedic and rheumatologist. Pt stating she has had a left hip replacement and getting ready to start injections in right knee. Pt stating she has pain in right hip and right buttocks.      The following portions of the patient's history were reviewed and updated as appropriate: allergies, current medications, past family history, past medical history, past social history, past surgical history and problem list.    Past Medical History:   Diagnosis Date   • Arthritis    • GERD (gastroesophageal reflux disease)    • Hypothyroidism    • Left hip pain    • Migraines     ON MED   • UTI (urinary tract infection)     CURRENTLY FINISHING AB FOR A UTI FOUND COUPLE WEEKS AGO       Past Surgical History:   Procedure Laterality Date   • COLONOSCOPY  approx 2008    normal per pt    • COLONOSCOPY W/ POLYPECTOMY N/A 3/7/2019    One 3 mm polyp in the transverse colon. Path:Mild chronic inflammation.    • ENDOSCOPY N/A 3/7/2019    LA Grade A esophagitis   • FOOT SURGERY Right     CYST   • HYSTERECTOMY     • TONSILLECTOMY     • TOTAL HIP ARTHROPLASTY Left 3/16/2018    Procedure: LT TOTAL HIP ARTHROPLASTY ANTERIOR WITH HANA TABLE;  Surgeon: Sergio Balderas MD;  Location: [facility];  Service: Orthopedics       Review of Systems   Constitutional: Negative for fever.   Gastrointestinal: Negative.    Genitourinary: Negative.    Musculoskeletal: Positive for arthralgias (low back/right hip) and gait problem.   Neurological: Negative for weakness,  "numbness and headaches.       Allergies   Allergen Reactions   • Naproxen Other (See Comments)     Kidney dysfunction   • Nsaids Other (See Comments)     KIDNEY DYSFUNCTION       Objective     Vitals:    02/25/20 1505   BP: 108/70   BP Location: Left arm   Patient Position: Sitting   Cuff Size: Adult   Pulse: 78   Resp: 18   Temp: 98.3 °F (36.8 °C)   TempSrc: Oral   SpO2: 98%   Weight: 75.2 kg (165 lb 12.8 oz)   Height: 172.7 cm (67.99\")     Body mass index is 25.22 kg/m².    Physical Exam   Constitutional: She is oriented to person, place, and time. She appears well-developed and well-nourished.   HENT:   Head: Normocephalic.   Eyes: Pupils are equal, round, and reactive to light.   Cardiovascular: Normal rate and regular rhythm.   Pulmonary/Chest: Effort normal and breath sounds normal.   Musculoskeletal:   Lumbar spine: full ROM, no swelling noted, no redness noted, no bruising noted, tenderness noted to right musculature with palpation    Right SI: no swelling noted, no redness noted, no bruising noted, tenderness noted to palpation    Right Buttocks: tenderness noted to palpation    Right hip: Full ROM, no swelling noted, no tenderness noted to palpation    Straight leg test negative       Neurological: She is alert and oriented to person, place, and time. Gait (walks with limp) abnormal.   Vitals reviewed.        Current Outpatient Medications:   •  acetaminophen (TYLENOL) 500 MG tablet, Take 500 mg by mouth Every 6 (Six) Hours As Needed for Mild Pain ., Disp: , Rfl:   •  AIMOVIG 70 MG/ML prefilled syringe, INJECT 1 ML INTO THE SKIN EVERY 30 (THIRTY) DAYS AS INSTRUCTED/EDUCATED., Disp: , Rfl: 5  •  calcitriol (ROCALTROL) 0.5 MCG capsule, Take 0.5 mcg by mouth 2 (Two) Times a Day., Disp: , Rfl:   •  CALCIUM PO, Take 2,000 mg by mouth 3 (Three) Times a Day. CHEWABLE TABLETS, Disp: , Rfl:   •  Calcium Polycarbophil (FIBERCON PO), Take  by mouth Daily., Disp: , Rfl:   •  doxycycline (MONODOX) 100 MG capsule, Take " 1 capsule by mouth 2 (Two) Times a Day., Disp: 20 capsule, Rfl: 0  •  DULoxetine (CYMBALTA) 30 MG capsule, TAKE 1 CAPSULE BY ORAL ROUTE EVERY DAY, Disp: , Rfl: 1  •  estradiol (ESTRACE) 0.1 MG/GM vaginal cream, Insert 1 applicator into the vagina 2 (Two) Times a Week., Disp: , Rfl: 6  •  levothyroxine (SYNTHROID, LEVOTHROID) 88 MCG tablet, Take 88 mcg by mouth Daily., Disp: , Rfl:   •  liothyronine (CYTOMEL) 5 MCG tablet, TAKE 1 TABLET BY MOUTH EVERY DAY, Disp: , Rfl:   •  liothyronine (CYTOMEL) 5 MCG tablet, Take 5 mcg by mouth Daily., Disp: , Rfl:   •  Magnesium 500 MG capsule, Take  by mouth., Disp: , Rfl:   •  pantoprazole (PROTONIX) 40 MG EC tablet, TAKE 1 TABLET BY MOUTH EVERY DAY, Disp: 30 tablet, Rfl: 0  •  rizatriptan (MAXALT) 10 MG tablet, Take 10 mg by mouth As Needed. TAKE 1 TABLET BY MOUTH ONCE AS NEEDED FOR MIGRAINE FOR UP TO 1 DOSE MAY REPEAT IN 2 HOURS IF NEEDED, Disp: , Rfl: 5  •  topiramate (TOPAMAX) 50 MG tablet, Take 50 mg by mouth 2 (Two) Times a Day., Disp: , Rfl:   •  vitamin D (ERGOCALCIFEROL) 61414 units capsule capsule, TAKE 1 CAPSULE BY MOUTH EVERY 30 (THIRTY) DAYS, Disp: , Rfl: 1  •  cyclobenzaprine (FLEXERIL) 5 MG tablet, Take 1 tablet by mouth 2 (Two) Times a Day As Needed for Muscle Spasms., Disp: 12 tablet, Rfl: 0  •  methylPREDNISolone (MEDROL, WAGNER,) 4 MG tablet, Take as directed on package instructions., Disp: 1 each, Rfl: 0    Assessment/Plan   Keila was seen today for hip pain.    Diagnoses and all orders for this visit:    Acute right-sided low back pain with right-sided sciatica  -     XR Spine Lumbar 4+ View; Future  -     methylPREDNISolone (MEDROL, WAGNER,) 4 MG tablet; Take as directed on package instructions.    Right hip pain  -     XR Hip With or Without Pelvis 2 - 3 View Right; Future  -     cyclobenzaprine (FLEXERIL) 5 MG tablet; Take 1 tablet by mouth 2 (Two) Times a Day As Needed for Muscle Spasms.    Fall, initial encounter        Patient Instructions   May use cold  compress/ice pack 10-15 minutes at a time several times a day   May use warm compress/heating pad 10-15 minutes at at time several times a day  May use over the counter biofreeze as needed (wash off from skin before using heating pad  Will await xray results for further treatment plan (if negative will refer to physical therapy)      Return if symptoms worsen or fail to improve.

## 2020-02-25 NOTE — PATIENT INSTRUCTIONS
May use cold compress/ice pack 10-15 minutes at a time several times a day   May use warm compress/heating pad 10-15 minutes at at time several times a day  May use over the counter biofreeze as needed (wash off from skin before using heating pad  Will await xray results for further treatment plan (if negative will refer to physical therapy)

## 2020-02-27 ENCOUNTER — TELEPHONE (OUTPATIENT)
Dept: FAMILY MEDICINE CLINIC | Facility: CLINIC | Age: 60
End: 2020-02-27

## 2020-02-27 DIAGNOSIS — M25.551 RIGHT HIP PAIN: ICD-10-CM

## 2020-02-27 DIAGNOSIS — M54.41 ACUTE RIGHT-SIDED LOW BACK PAIN WITH RIGHT-SIDED SCIATICA: Primary | ICD-10-CM

## 2020-02-27 NOTE — TELEPHONE ENCOUNTER
Called and informed pt of her right hip and lumbar spine xray results: The right hip joint space appears well-maintained. No  fractures or dislocations are seen. Left hip prosthesis is seen in good position. Status post left hip arthroplasty, No acute process identified in the pelvis and right hip. Pt informed of referral to physical therapy. Pt verb. Understanding.

## 2020-03-04 RX ORDER — PANTOPRAZOLE SODIUM 40 MG/1
TABLET, DELAYED RELEASE ORAL
Qty: 30 TABLET | Refills: 3 | Status: SHIPPED | OUTPATIENT
Start: 2020-03-04 | End: 2021-02-19

## 2020-03-09 ENCOUNTER — APPOINTMENT (OUTPATIENT)
Dept: PHYSICAL THERAPY | Facility: HOSPITAL | Age: 60
End: 2020-03-09

## 2020-03-26 ENCOUNTER — APPOINTMENT (OUTPATIENT)
Dept: PHYSICAL THERAPY | Facility: HOSPITAL | Age: 60
End: 2020-03-26

## 2020-05-04 ENCOUNTER — HOSPITAL ENCOUNTER (OUTPATIENT)
Dept: PHYSICAL THERAPY | Facility: HOSPITAL | Age: 60
Setting detail: THERAPIES SERIES
Discharge: HOME | End: 2020-05-04

## 2020-05-04 DIAGNOSIS — M25.551 RIGHT HIP PAIN: Primary | ICD-10-CM

## 2020-05-04 DIAGNOSIS — R26.9 GAIT ABNORMALITY: ICD-10-CM

## 2020-05-04 DIAGNOSIS — M54.31 SCIATICA OF RIGHT SIDE: ICD-10-CM

## 2020-05-04 PROCEDURE — 97162 PT EVAL MOD COMPLEX 30 MIN: CPT | Performed by: PHYSICAL THERAPIST

## 2020-05-04 PROCEDURE — 97110 THERAPEUTIC EXERCISES: CPT | Performed by: PHYSICAL THERAPIST

## 2020-05-04 NOTE — THERAPY EVALUATION
Outpatient Physical Therapy Ortho Initial Evaluation  [facility]     Patient Name: Keila Caldwell  : 1960  MRN: [id number]  Today's Date: 2020      Visit Date: 2020    Patient Active Problem List   Diagnosis   • Hypothyroidism   • History of thyroidectomy   • Hypocalcemia   • Hypoparathyroidism (CMS/HCC)   • Intractable chronic migraine without aura   • Low vitamin D level   • Postmenopausal   • Annual physical exam   • Generalized abdominal pain   • Dyspepsia   • Encounter for screening for malignant neoplasm of colon   • Pharyngitis   • Viral upper respiratory tract infection   • Abdominal bloating   • Hypomagnesemia   • Vitamin D deficiency        Past Medical History:   Diagnosis Date   • Arthritis    • GERD (gastroesophageal reflux disease)    • Hypothyroidism    • Left hip pain    • Migraines     ON MED   • UTI (urinary tract infection)     CURRENTLY FINISHING AB FOR A UTI FOUND COUPLE WEEKS AGO        Past Surgical History:   Procedure Laterality Date   • COLONOSCOPY  approx     normal per pt    • COLONOSCOPY W/ POLYPECTOMY N/A 3/7/2019    One 3 mm polyp in the transverse colon. Path:Mild chronic inflammation.    • ENDOSCOPY N/A 3/7/2019    LA Grade A esophagitis   • FOOT SURGERY Right     CYST   • HYSTERECTOMY     • TONSILLECTOMY     • TOTAL HIP ARTHROPLASTY Left 3/16/2018    Procedure: LT TOTAL HIP ARTHROPLASTY ANTERIOR WITH HANA TABLE;  Surgeon: Sergio Balderas MD;  Location: [facility];  Service: Orthopedics       Visit Dx:     ICD-10-CM ICD-9-CM   1. Right hip pain M25.551 719.45   2. Sciatica of right side M54.31 724.3   3. Gait abnormality R26.9 781.2         Patient History     Row Name 20 1100             History    Chief Complaint  Pain;Difficulty Walking  -GR      Type of Pain  Hip pain  -GR      Date Current Problem(s) Began  19  -GR      Brief Description of Current Complaint  C/o R hip pain since falling out of a broken hammock 19.  Her  care was delayed due to COVID.  In the interim she has been using topical cream and tylenol arthritis. Xrays are negative.  She thought her problem was muscular. Yoga stretches due help.  She states walking up to 1 mile will aggravate her the next day.  She has a h/o of L LANETTE in 2017 and rheumatoid arhtritis, unable to take NSAIDs.  She did have a R knee injection last week for some knee pain which helped with knee pain but not the hip.  Works with a  (pre-COVID). Works as a  for [employer].  -GR      Patient/Caregiver Goals  Relieve pain;Improve mobility  -GR      Occupation/sports/leisure activities   [employer]  -GR      What clinical tests have you had for this problem?  X-ray  -GR      Results of Clinical Tests  unremarkable  -GR      Are you or can you be pregnant  No  -GR         Pain     Pain Location  Hip  -GR      Pain at Present  2  -GR      Pain at Best  0  -GR      Pain at Worst  6  -GR      Is your sleep disturbed?  Yes  -GR         Fall Risk Assessment    Any falls in the past year:  Yes  -GR      Number of falls reported in the last 12 months  1  -GR      Factors that contributed to the fall:  Other (comment) [place]  -GR         Services    Prior Rehab/Home Health Experiences  No  -GR         Daily Activities    Primary Language  English  -GR      How does patient learn best?  Listening;Reading  -GR      Pt Participated in POC and Goals  Yes  -GR         Safety    Are you being hurt, hit, or frightened by anyone at home or in your life?  No  -GR      Are you being neglected by a caregiver  No  -GR        User Key  (r) = Recorded By, (t) = Taken By, (c) = Cosigned By    Initials Name Provider Type    GR Mt Ross, PT Physical Therapist          PT Ortho     Row Name 05/04/20 1100       Quarter Clearing    Quarter Clearing  Lower Quarter Clearing  -GR       Neural Tension Signs- Lower Quarter Clearing    SLR  Bilateral:;Negative  -GR       Myotomal Screen-  "Lower Quarter Clearing    Hip flexion (L2)  Right:;4 (Good);Left:;5 (Normal)  -GR    Knee extension (L3)  Right:;4 (Good);Left:;5 (Normal)  -GR    Ankle DF (L4)  Bilateral:;5 (Normal)  -GR    Ankle PF (S1)  Bilateral:;5 (Normal)  -GR    Knee flexion (S2)  Bilateral:;5 (Normal)  -GR       Lumbar ROM Screen- Lower Quarter Clearing    Lumbar Flexion  Impaired 50%  -GR    Lumbar Extension  Normal  -GR    Lumbar Lateral Flexion  Normal \"pulling\" on R when laterally flexing to the L  -GR    Lumbar Rotation  Normal  -GR       Special Tests/Palpation    Special Tests/Palpation  Hip  -GR       Hip/Thigh Palpation    Hip/Thigh Palpation?  Yes  -GR    Greater Trochanter  Right:;Tender;Guarded/taut;Swollen  -GR    ITB  Right:;Tender;Guarded/taut  -GR       Hip Special Tests    BEBETO (hip vs SI pathology)  Right:;Positive mild  -GR    Hip scour test (labral vs hip pathology)  Right:;Negative  -GR    FAIR test (piriformis syndrome)  Right:;Unable to test  -GR       MMT (Manual Muscle Testing)    Rt Lower Ext  Rt Hip ABduction  -GR       MMT Right Lower Ext    Rt Hip ABduction MMT, Gross Movement  (3+/5) fair plus  -GR       Sensation    Sensation WNL?  WNL  -GR       Balance Skills Training    SLS  intact  -GR       Gait/Stairs Assessment/Training    Comment (Gait/Stairs)  decreased arm swing  -GR      User Key  (r) = Recorded By, (t) = Taken By, (c) = Cosigned By    Initials Name Provider Type    Mt Escobar, PT Physical Therapist                      Therapy Education  Education Details: Role of outpatient PT, POC, differential diagnosis, initial HEP, expectations  Given: HEP, Symptoms/condition management  Program: New  How Provided: Verbal, Demonstration, Written  Provided to: Patient  Level of Understanding: Teach back education performed, Verbalized, Demonstrated     PT OP Goals     Row Name 05/04/20 1100          PT Short Term Goals    STG Date to Achieve  05/19/20  -GR     STG 1  Patient will be independent with " initial HEP.  -GR     STG 1 Progress  New  -GR     STG 2  Patient will be independent with use of home foam roller for symptom reduction.  -GR     STG 2 Progress  New  -GR        Long Term Goals    LTG Date to Achieve  06/18/20  -GR     LTG 1  Patient will be I with progressive HEP for long term condition management.  -GR     LTG 1 Progress  New  -GR     LTG 2  Patient will ambulate with near normal gait pattern.  -GR     LTG 2 Progress  New  -GR     LTG 3  Patient will resume walking >1 mile with pain <2/10 the next day.  -GR     LTG 3 Progress  New  -GR     LTG 4  Patient nicole demonstrate R glut med strength 4/5 or greater per MMT to normalize gait.  -GR     LTG 4 Progress  New  -GR        Time Calculation    PT Goal Re-Cert Due Date  08/02/20  -GR       User Key  (r) = Recorded By, (t) = Taken By, (c) = Cosigned By    Initials Name Provider Type    GR Mt Ross, PT Physical Therapist          PT Assessment/Plan     Row Name 05/04/20 1147          PT Assessment    Functional Limitations  Limitations in community activities;Performance in leisure activities  -GR     Impairments  Pain;Range of motion;Muscle strength;Posture  -GR     Assessment Comments  60 y.o. female referred to outpatient physical therapy for evaluation and treatment of right hip pain after falling out of hammock 12/31/20.  Patient presents with tenderness over the greater trochanter, restricted hamstring length limiting lumbar AROM, abnormal and rigid gait pattern and gluteal weaknes. Signs and symptoms are consistent with referring diagnosis; unable to rule out bursitis.  Pertinent comorbidities and personal factors that may affect progress include, but are not limited to, arthritis, unable to take NSAIDs, h/o of contralateral L LANETTE, sedentary desk job.  This condition is evolving. Recommend skilled PT to address functional deficits. Per COVID epidemic will attempt 1x/week with heavy focus on home program.  Thank you for this referral.   -GR     Please refer to paper survey for additional self-reported information  Yes  -GR     Rehab Potential  Excellent  -GR     Patient/caregiver participated in establishment of treatment plan and goals  Yes  -GR     Patient would benefit from skilled therapy intervention  Yes  -GR        PT Plan    PT Frequency  1x/week  -GR     Predicted Duration of Therapy Intervention (Therapy Eval)  4 visits  -GR     Planned CPT's?  PT EVAL MOD COMPLELITY: 90173;PT RE-EVAL: 25534;PT THER PROC EA 15 MIN: 93803;PT THER ACT EA 15 MIN: 10574;PT MANUAL THERAPY EA 15 MIN: 54436;PT NEUROMUSC RE-EDUCATION EA 15 MIN: 95318;PT GAIT TRAINING EA 15 MIN: 79378;PT HOT OR COLD PACK TREAT MCARE;PT ELECTRICAL STIM UNATTEND: ;PT ELECTRICAL STIM ATTD EA 15 MIN: 27169;PT TRACTION LUMBAR: 73212  -GR     Physical Therapy Interventions (Optional Details)  home exercise program;joint mobilization;lumbar stabilization;manual therapy techniques;modalities;neuromuscular re-education;patient/family education;postural re-education;ROM (Range of Motion);stair training;strengthening;stretching  -GR     PT Plan Comments  Attempt weekly progression of HEP to strengthen gluteals and reduce strain on lateral R hip.  -GR       User Key  (r) = Recorded By, (t) = Taken By, (c) = Cosigned By    Initials Name Provider Type    GR Mt Ross, PT Physical Therapist            OP Exercises     Row Name 05/04/20 1100             Total Minutes    80341 - PT Therapeutic Exercise Minutes  10  -GR         Exercise 1    Exercise Name 1  ITB self release with foam roller seated- option for sidleying (on HEP)  -GR         Exercise 2    Exercise Name 2  Bridge  -GR      Cueing 2  Demo  -GR      Additional Comments  goal fatigue - 20 reps to start  -GR         Exercise 3    Exercise Name 3  SL clam  -GR      Cueing 3  Demo  -GR      Additional Comments  goal fatigue - 20 reps to start  -GR         Exercise 4    Exercise Name 4  SL hip abduction  -GR      Cueing 4   Demo  -GR      Additional Comments  goal fatigue- 20 reps to start  -GR        User Key  (r) = Recorded By, (t) = Taken By, (c) = Cosigned By    Initials Name Provider Type    Mt Escobar, PT Physical Therapist                                  Time Calculation:     Start Time: 1100  Stop Time: 1138  Time Calculation (min): 38 min  Total Timed Code Minutes- PT: 10 minute(s)     Therapy Charges for Today     Code Description Service Date Service Provider Modifiers Qty    03660622460  PT THER PROC EA 15 MIN 5/4/2020 Mt Ross, PT GP 1    [id number]  PT EVAL MOD COMPLEXITY 2 5/4/2020 Mt Ross, PT GP 1                    Mt Ross, PT  5/4/2020

## 2020-05-11 ENCOUNTER — APPOINTMENT (OUTPATIENT)
Dept: PHYSICAL THERAPY | Facility: HOSPITAL | Age: 60
End: 2020-05-11

## 2020-05-12 ENCOUNTER — HOSPITAL ENCOUNTER (OUTPATIENT)
Dept: PHYSICAL THERAPY | Facility: HOSPITAL | Age: 60
Setting detail: THERAPIES SERIES
Discharge: HOME | End: 2020-05-12

## 2020-05-12 DIAGNOSIS — R26.9 GAIT ABNORMALITY: ICD-10-CM

## 2020-05-12 DIAGNOSIS — M54.31 SCIATICA OF RIGHT SIDE: Primary | ICD-10-CM

## 2020-05-12 DIAGNOSIS — M25.551 RIGHT HIP PAIN: ICD-10-CM

## 2020-05-12 PROCEDURE — 97110 THERAPEUTIC EXERCISES: CPT | Performed by: PHYSICAL THERAPIST

## 2020-05-12 NOTE — THERAPY TREATMENT NOTE
Outpatient Physical Therapy Ortho Treatment Note  [facility]     Patient Name: Keila Caldwell  : 1960  MRN: [id number]  Today's Date: 2020      Visit Date: 2020    Visit Dx:    ICD-10-CM ICD-9-CM   1. Sciatica of right side M54.31 724.3   2. Right hip pain M25.551 719.45   3. Gait abnormality R26.9 781.2       Patient Active Problem List   Diagnosis   • Hypothyroidism   • History of thyroidectomy   • Hypocalcemia   • Hypoparathyroidism (CMS/HCC)   • Intractable chronic migraine without aura   • Low vitamin D level   • Postmenopausal   • Annual physical exam   • Generalized abdominal pain   • Dyspepsia   • Encounter for screening for malignant neoplasm of colon   • Pharyngitis   • Viral upper respiratory tract infection   • Abdominal bloating   • Hypomagnesemia   • Vitamin D deficiency        Past Medical History:   Diagnosis Date   • Arthritis    • GERD (gastroesophageal reflux disease)    • Hypothyroidism    • Left hip pain    • Migraines     ON MED   • UTI (urinary tract infection)     CURRENTLY FINISHING AB FOR A UTI FOUND COUPLE WEEKS AGO        Past Surgical History:   Procedure Laterality Date   • COLONOSCOPY  approx     normal per pt    • COLONOSCOPY W/ POLYPECTOMY N/A 3/7/2019    One 3 mm polyp in the transverse colon. Path:Mild chronic inflammation.    • ENDOSCOPY N/A 3/7/2019    LA Grade A esophagitis   • FOOT SURGERY Right     CYST   • HYSTERECTOMY     • TONSILLECTOMY     • TOTAL HIP ARTHROPLASTY Left 3/16/2018    Procedure: LT TOTAL HIP ARTHROPLASTY ANTERIOR WITH HANA TABLE;  Surgeon: Sergio Balderas MD;  Location: [facility];  Service: Orthopedics                       PT Assessment/Plan     Row Name 20 1027          PT Assessment    Assessment Comments  Patient returns for 1st follow up with improvement in condition and good return of initial HEP. She was rather eager to progress thru the session and did leave with an updated HEP and appointment to follow  up in 1 week.  -GR        PT Plan    PT Plan Comments  Assess response to updated. add closed chain and hip hike.  -GR       User Key  (r) = Recorded By, (t) = Taken By, (c) = Cosigned By    Initials Name Provider Type    Mt Escobar, PT Physical Therapist            OP Exercises     Row Name 05/12/20 1000             Subjective Comments    Subjective Comments  Overall doing quite well since eval. Has even had a few days without pain. Got a foam roller from [employer], has been doing HEP 2x/day and icing every night.  -GR         Subjective Pain    Able to rate subjective pain?  yes  -GR      Pre-Treatment Pain Level  3  -GR         Total Minutes    78504 - PT Therapeutic Exercise Minutes  17  -GR         Exercise 1    Exercise Name 1  Nustep  -GR      Time 1  5 min  -GR         Exercise 2    Exercise Name 2  Bridge  -GR      Cueing 2  Demo  -GR      Sets 2  1  -GR      Reps 2  20  -GR      Additional Comments  ques to disperse weight thru forefoot  -GR         Exercise 3    Exercise Name 3  SL clam  -GR      Cueing 3  Demo  -GR      Sets 3  1  -GR      Reps 3  20  -GR      Additional Comments  1 set of 10 with RTB  -GR         Exercise 4    Exercise Name 4  SL hip abduction  -GR      Cueing 4  Demo  -GR      Reps 4  10  -GR         Exercise 5    Exercise Name 5  Reverse clam  -GR      Cueing 5  Demo  -GR      Sets 5  1  -GR      Reps 5  10  -GR         Exercise 6    Exercise Name 6  SAQ  -GR      Cueing 6  Demo  -GR      Sets 6  1  -GR      Reps 6  10  -GR         Exercise 7    Exercise Name 7  Foam roller - hamstring long sitting, ITB seated, piriformis long sitting  -GR      Time 7  2 min each group  -GR        User Key  (r) = Recorded By, (t) = Taken By, (c) = Cosigned By    Initials Name Provider Type    Mt Escobar, PT Physical Therapist                       PT OP Goals     Row Name 05/12/20 1000          PT Short Term Goals    STG Date to Achieve  05/19/20  -GR     STG 1  Patient will be  independent with initial HEP.  -GR     STG 1 Progress  Met  -GR     STG 2  Patient will be independent with use of home foam roller for symptom reduction.  -GR     STG 2 Progress  Ongoing  -GR     STG 2 Progress Comments  purchased, still progressing  -GR        Long Term Goals    LTG Date to Achieve  06/18/20  -GR     LTG 1  Patient will be I with progressive HEP for long term condition management.  -GR     LTG 1 Progress  Ongoing  -GR     LTG 2  Patient will ambulate with near normal gait pattern.  -GR     LTG 2 Progress  Ongoing  -GR     LTG 3  Patient will resume walking >1 mile with pain <2/10 the next day.  -GR     LTG 3 Progress  Ongoing  -GR     LTG 4  Patient nicole demonstrate R glut med strength 4/5 or greater per MMT to normalize gait.  -GR     LTG 4 Progress  Ongoing  -GR       User Key  (r) = Recorded By, (t) = Taken By, (c) = Cosigned By    Initials Name Provider Type     Mt Ross, PT Physical Therapist                         Time Calculation:   Start Time: 1000  Stop Time: 1017  Time Calculation (min): 17 min  Total Timed Code Minutes- PT: 17 minute(s)  Therapy Charges for Today     Code Description Service Date Service Provider Modifiers Qty    [id number] HC PT THER PROC EA 15 MIN 5/12/2020 Mt Ross, PT GP 1                    Mt Ross PT  5/12/2020

## 2020-05-18 ENCOUNTER — HOSPITAL ENCOUNTER (OUTPATIENT)
Dept: PHYSICAL THERAPY | Facility: HOSPITAL | Age: 60
Setting detail: THERAPIES SERIES
Discharge: HOME | End: 2020-05-18

## 2020-05-18 DIAGNOSIS — M25.551 RIGHT HIP PAIN: ICD-10-CM

## 2020-05-18 DIAGNOSIS — M54.31 SCIATICA OF RIGHT SIDE: Primary | ICD-10-CM

## 2020-05-18 DIAGNOSIS — R26.9 GAIT ABNORMALITY: ICD-10-CM

## 2020-05-18 PROCEDURE — 97110 THERAPEUTIC EXERCISES: CPT | Performed by: PHYSICAL THERAPIST

## 2020-05-18 NOTE — THERAPY TREATMENT NOTE
Outpatient Physical Therapy Ortho Treatment Note  [facility]     Patient Name: Keila Caldwell  : 1960  MRN: [id number]  Today's Date: 2020      Visit Date: 2020    Visit Dx:    ICD-10-CM ICD-9-CM   1. Sciatica of right side M54.31 724.3   2. Right hip pain M25.551 719.45   3. Gait abnormality R26.9 781.2       Patient Active Problem List   Diagnosis   • Hypothyroidism   • History of thyroidectomy   • Hypocalcemia   • Hypoparathyroidism (CMS/HCC)   • Intractable chronic migraine without aura   • Low vitamin D level   • Postmenopausal   • Annual physical exam   • Generalized abdominal pain   • Dyspepsia   • Encounter for screening for malignant neoplasm of colon   • Pharyngitis   • Viral upper respiratory tract infection   • Abdominal bloating   • Hypomagnesemia   • Vitamin D deficiency        Past Medical History:   Diagnosis Date   • Arthritis    • GERD (gastroesophageal reflux disease)    • Hypothyroidism    • Left hip pain    • Migraines     ON MED   • UTI (urinary tract infection)     CURRENTLY FINISHING AB FOR A UTI FOUND COUPLE WEEKS AGO        Past Surgical History:   Procedure Laterality Date   • COLONOSCOPY  approx     normal per pt    • COLONOSCOPY W/ POLYPECTOMY N/A 3/7/2019    One 3 mm polyp in the transverse colon. Path:Mild chronic inflammation.    • ENDOSCOPY N/A 3/7/2019    LA Grade A esophagitis   • FOOT SURGERY Right     CYST   • HYSTERECTOMY     • TONSILLECTOMY     • TOTAL HIP ARTHROPLASTY Left 3/16/2018    Procedure: LT TOTAL HIP ARTHROPLASTY ANTERIOR WITH HANA TABLE;  Surgeon: Sergio Balderas MD;  Location: [facility];  Service: Orthopedics                       PT Assessment/Plan     Row Name 20 1022          PT Assessment    Assessment Comments  Note excessive supination with gait and trendelenburg drop. Continue to recommend glut med strengthening and also added SLS with trunk rotation for intrinsic foot strengthening. Overall her pain is  reducing and she is quite improved in 2 weeks time.  -GR        PT Plan    PT Plan Comments  Continue POC. Add hip hike next visit.  -GR       User Key  (r) = Recorded By, (t) = Taken By, (c) = Cosigned By    Initials Name Provider Type    Mt Escobar, PT Physical Therapist            OP Exercises     Row Name 05/18/20 1000             Subjective Comments    Subjective Comments  Patient  states she did try to go for a longer walk one day and she started hurting about 10 minutes.  She ended up walking for about 30 minutes total and took some tylenol arthritis which resolved her pain.  -GR         Subjective Pain    Able to rate subjective pain?  yes  -GR      Pre-Treatment Pain Level  1  -GR      Subjective Pain Comment  just twinge  -GR         Total Minutes    33513 - PT Therapeutic Exercise Minutes  20  -GR         Exercise 2    Exercise Name 2  SL bridge  -GR      Cueing 2  Demo  -GR      Sets 2  1  -GR      Reps 2  5  -GR      Additional Comments  each side; option for home  -GR         Exercise 3    Exercise Name 3  Feet elevated clam  -GR      Cueing 3  Demo  -GR      Sets 3  1  -GR      Reps 3  10  -GR      Additional Comments  each side  -GR         Exercise 4    Exercise Name 4  SL hip abduction circles  -GR      Cueing 4  Demo  -GR      Sets 4  2  -GR      Reps 4  10  -GR      Additional Comments  clockwise and counterclockwise  -GR         Exercise 5    Exercise Name 5  Reverse clam  -GR      Additional Comments  reviewed  -GR         Exercise 6    Exercise Name 6  SAQ  -GR      Cueing 6  Demo  -GR      Sets 6  2  -GR      Reps 6  10  -GR      Additional Comments  2#  -GR         Exercise 8    Exercise Name 8  SLS + trunk rotation  -GR      Cueing 8  Demo  -GR      Sets 8  1  -GR      Reps 8  3  -GR      Additional Comments  per gait assessment noting excessive supination  -GR        User Key  (r) = Recorded By, (t) = Taken By, (c) = Cosigned By    Initials Name Provider Type    GENTRY Ross  Mt VÁSQUEZ, PT Physical Therapist                       PT OP Goals     Row Name 05/18/20 1000          PT Short Term Goals    STG Date to Achieve  05/19/20  -GR     STG 1  Patient will be independent with initial HEP.  -GR     STG 1 Progress  Met  -GR     STG 2  Patient will be independent with use of home foam roller for symptom reduction.  -GR     STG 2 Progress  Met  -GR        Long Term Goals    LTG Date to Achieve  06/18/20  -GR     LTG 1  Patient will be I with progressive HEP for long term condition management.  -GR     LTG 1 Progress  Ongoing  -GR     LTG 2  Patient will ambulate with near normal gait pattern.  -GR     LTG 2 Progress  Ongoing  -GR     LTG 3  Patient will resume walking >1 mile with pain <2/10 the next day.  -GR     LTG 3 Progress  Ongoing  -GR     LTG 3 Progress Comments  states painful after 10 minute in 30 minute trial of walking over weekend  -GR     LTG 4  Patient nicole demonstrate R glut med strength 4/5 or greater per MMT to normalize gait.  -GR     LTG 4 Progress  Ongoing  -GR       User Key  (r) = Recorded By, (t) = Taken By, (c) = Cosigned By    Initials Name Provider Type     Mt Ross, PT Physical Therapist                         Time Calculation:   Start Time: 1000  Stop Time: 1020  Time Calculation (min): 20 min  Total Timed Code Minutes- PT: 20 minute(s)  Therapy Charges for Today     Code Description Service Date Service Provider Modifiers Qty    [id number]  PT THER PROC EA 15 MIN 5/18/2020 Mt Ross, PT GP 1                    Mt VÁSQUEZ. Vandana, EDWARD  5/18/2020

## 2020-05-27 ENCOUNTER — APPOINTMENT (OUTPATIENT)
Dept: PHYSICAL THERAPY | Facility: HOSPITAL | Age: 60
End: 2020-05-27

## 2020-06-01 ENCOUNTER — APPOINTMENT (OUTPATIENT)
Dept: PHYSICAL THERAPY | Facility: HOSPITAL | Age: 60
End: 2020-06-01

## 2020-06-08 ENCOUNTER — HOSPITAL ENCOUNTER (OUTPATIENT)
Dept: PHYSICAL THERAPY | Facility: HOSPITAL | Age: 60
Setting detail: THERAPIES SERIES
Discharge: HOME | End: 2020-06-08

## 2020-06-08 DIAGNOSIS — M54.31 SCIATICA OF RIGHT SIDE: Primary | ICD-10-CM

## 2020-06-08 DIAGNOSIS — R26.9 GAIT ABNORMALITY: ICD-10-CM

## 2020-06-08 DIAGNOSIS — M25.551 RIGHT HIP PAIN: ICD-10-CM

## 2020-06-08 PROCEDURE — 97110 THERAPEUTIC EXERCISES: CPT | Performed by: PHYSICAL THERAPIST

## 2020-06-08 NOTE — THERAPY DISCHARGE NOTE
Outpatient Physical Therapy Ortho Treatment Note/Discharge Summary  [facility]     Patient Name: Keila Caldwell  : 1960  MRN: [id number]  Today's Date: 2020      Visit Date: 2020    Visit Dx:    ICD-10-CM ICD-9-CM   1. Sciatica of right side M54.31 724.3   2. Right hip pain M25.551 719.45   3. Gait abnormality R26.9 781.2       Patient Active Problem List   Diagnosis   • Hypothyroidism   • History of thyroidectomy   • Hypocalcemia   • Hypoparathyroidism (CMS/HCC)   • Intractable chronic migraine without aura   • Low vitamin D level   • Postmenopausal   • Annual physical exam   • Generalized abdominal pain   • Dyspepsia   • Encounter for screening for malignant neoplasm of colon   • Pharyngitis   • Viral upper respiratory tract infection   • Abdominal bloating   • Hypomagnesemia   • Vitamin D deficiency        Past Medical History:   Diagnosis Date   • Arthritis    • GERD (gastroesophageal reflux disease)    • Hypothyroidism    • Left hip pain    • Migraines     ON MED   • UTI (urinary tract infection)     CURRENTLY FINISHING AB FOR A UTI FOUND COUPLE WEEKS AGO        Past Surgical History:   Procedure Laterality Date   • COLONOSCOPY  approx     normal per pt    • COLONOSCOPY W/ POLYPECTOMY N/A 3/7/2019    One 3 mm polyp in the transverse colon. Path:Mild chronic inflammation.    • ENDOSCOPY N/A 3/7/2019    LA Grade A esophagitis   • FOOT SURGERY Right     CYST   • HYSTERECTOMY     • TONSILLECTOMY     • TOTAL HIP ARTHROPLASTY Left 3/16/2018    Procedure: LT TOTAL HIP ARTHROPLASTY ANTERIOR WITH HANA TABLE;  Surgeon: Sergio Balderas MD;  Location: [facility] OR;  Service: Orthopedics       PT Ortho     Row Name 20 1000       Hip Special Tests    BEBETO (hip vs SI pathology)  Right:;Negative  -GR       MMT Right Lower Ext    Rt Hip ABduction MMT, Gross Movement  (4+/5) good plus  -GR      User Key  (r) = Recorded By, (t) = Taken By, (c) = Cosigned By    Initials Name Provider  Type    Mt Escobar, PT Physical Therapist                      PT Assessment/Plan     Row Name 06/08/20 1118          PT Assessment    Assessment Comments  Patient attended 4 sessions of skilled PT for R hip pain.  Her pain is now resolved at rest and she is gradually returning to yoga and longer walks.  Hip strength is much improved. All goals are met and she is discharged to home program at this time verbalizing agreement. Thank you for this referral.  -GR       User Key  (r) = Recorded By, (t) = Taken By, (c) = Cosigned By    Initials Name Provider Type    Mt Escobar, PT Physical Therapist              OP Exercises     Row Name 06/08/20 1000             Subjective Comments    Subjective Comments  Doing much better. Has walked up to 2.5 miles with some pain  -GR         Subjective Pain    Able to rate subjective pain?  yes  -GR      Pre-Treatment Pain Level  0  -GR         Total Minutes    34415 - PT Therapeutic Exercise Minutes  15  -GR         Exercise 1    Exercise Name 1  Nustep  -GR      Time 1  5 min  -GR         Exercise 2    Exercise Name 2  Finalized HEP review  -GR         Exercise 8    Exercise Name 8  SLS + trunk rotation  -GR      Cueing 8  Demo  -GR      Sets 8  1  -GR      Reps 8  3  -GR        User Key  (r) = Recorded By, (t) = Taken By, (c) = Cosigned By    Initials Name Provider Type    Mt Escobar, PT Physical Therapist                         PT OP Goals     Row Name 06/08/20 1100          PT Short Term Goals    STG Date to Achieve  05/19/20  -GR     STG 1  Patient will be independent with initial HEP.  -GR     STG 1 Progress  Met  -GR     STG 2  Patient will be independent with use of home foam roller for symptom reduction.  -GR     STG 2 Progress  Met  -GR        Long Term Goals    LTG Date to Achieve  06/18/20  -GR     LTG 1  Patient will be I with progressive HEP for long term condition management.  -GR     LTG 1 Progress  Met  -GR     LTG 2  Patient will  "ambulate with near normal gait pattern.  -GR     LTG 2 Progress  Met  -GR     LTG 3  Patient will resume walking >1 mile with pain <2/10 the next day.  -GR     LTG 3 Progress  Met  -GR     LTG 4  Patient nicole demonstrate R glut med strength 4/5 or greater per MMT to normalize gait.  -GR     LTG 4 Progress  Met  -GR       User Key  (r) = Recorded By, (t) = Taken By, (c) = Cosigned By    Initials Name Provider Type    Mt Escobar, PT Physical Therapist          Therapy Education  Education Details: \"CBAN\" ice  massage              Time Calculation:   Start Time: 1050  Stop Time: 1105  Time Calculation (min): 15 min  Total Timed Code Minutes- PT: 15 minute(s)  Therapy Charges for Today     Code Description Service Date Service Provider Modifiers Qty    [id number]  PT THER PROC EA 15 MIN 6/8/2020 [place], PT GP 1                OP PT Discharge Summary  Date of Discharge: 06/08/20  Reason for Discharge: All goals achieved  Outcomes Achieved: Able to achieve all goals within established timeline  Discharge Destination: Home with home program  Discharge Instructions/Additional Comments: dc to I HEP. goals met.      Mt Ross, PT  6/8/2020       "

## 2020-10-15 ENCOUNTER — OFFICE VISIT (OUTPATIENT)
Dept: FAMILY MEDICINE CLINIC | Facility: CLINIC | Age: 60
End: 2020-10-15

## 2020-10-15 VITALS
RESPIRATION RATE: 18 BRPM | TEMPERATURE: 97.7 F | BODY MASS INDEX: 24.46 KG/M2 | HEART RATE: 71 BPM | HEIGHT: 68 IN | SYSTOLIC BLOOD PRESSURE: 110 MMHG | WEIGHT: 161.4 LBS | DIASTOLIC BLOOD PRESSURE: 70 MMHG | OXYGEN SATURATION: 97 %

## 2020-10-15 DIAGNOSIS — R07.89 OTHER CHEST PAIN: Primary | ICD-10-CM

## 2020-10-15 PROCEDURE — 99213 OFFICE O/P EST LOW 20 MIN: CPT | Performed by: INTERNAL MEDICINE

## 2020-10-15 RX ORDER — OMEPRAZOLE 20 MG/1
40 CAPSULE, DELAYED RELEASE ORAL DAILY
COMMUNITY
End: 2021-02-19

## 2020-10-15 NOTE — PROGRESS NOTES
Subjective   Keila Caldwell is a 60 y.o. female. Patient is here today for   Chief Complaint   Patient presents with   • Chest Pain     ER f/u          Vitals:    10/15/20 1114   BP: 110/70   Pulse: 71   Resp: 18   Temp: 97.7 °F (36.5 °C)   SpO2: 97%     Body mass index is 24.55 kg/m².    The following portions of the patient's history were reviewed and updated as appropriate: allergies, current medications, past family history, past medical history, past social history, past surgical history and problem list.    Past Medical History:   Diagnosis Date   • Arthritis    • GERD (gastroesophageal reflux disease)    • Hypothyroidism    • Left hip pain    • Migraines     ON MED   • UTI (urinary tract infection)     CURRENTLY FINISHING AB FOR A UTI FOUND COUPLE WEEKS AGO      Allergies   Allergen Reactions   • Naproxen Other (See Comments)     Kidney dysfunction   • Nsaids Other (See Comments)     KIDNEY DYSFUNCTION      Social History     Socioeconomic History   • Marital status:      Spouse name: Not on file   • Number of children: Not on file   • Years of education: Not on file   • Highest education level: Not on file   Tobacco Use   • Smoking status: Never Smoker   • Smokeless tobacco: Never Used   Substance and Sexual Activity   • Alcohol use: Yes     Alcohol/week: 1.0 standard drinks     Types: 1 Glasses of wine per week     Comment: once a month or so   • Drug use: No   • Sexual activity: Not Currently     Partners: Male     Birth control/protection: None        Current Outpatient Medications:   •  AIMOVIG 70 MG/ML prefilled syringe, INJECT 1 ML INTO THE SKIN EVERY 30 (THIRTY) DAYS AS INSTRUCTED/EDUCATED., Disp: , Rfl: 5  •  calcitriol (ROCALTROL) 0.5 MCG capsule, Take 0.5 mcg by mouth 2 (Two) Times a Day., Disp: , Rfl:   •  CALCIUM PO, Take 2,000 mg by mouth 3 (Three) Times a Day. CHEWABLE TABLETS, Disp: , Rfl:   •  Calcium Polycarbophil (FIBERCON PO), Take  by mouth Daily., Disp: , Rfl:   •  CBD  (cannabidiol) oral oil, Take  by mouth. 3000 mg at night, Disp: , Rfl:   •  estradiol (ESTRACE) 0.1 MG/GM vaginal cream, Insert 1 applicator into the vagina 2 (Two) Times a Week., Disp: , Rfl: 6  •  levothyroxine (SYNTHROID, LEVOTHROID) 88 MCG tablet, Take 88 mcg by mouth Daily., Disp: , Rfl:   •  liothyronine (CYTOMEL) 5 MCG tablet, TAKE 1 TABLET BY MOUTH EVERY DAY, Disp: , Rfl:   •  Magnesium 500 MG capsule, Take  by mouth., Disp: , Rfl:   •  omeprazole (priLOSEC) 20 MG capsule, Take 40 mg by mouth Daily., Disp: , Rfl:   •  rizatriptan (MAXALT) 10 MG tablet, Take 10 mg by mouth As Needed. TAKE 1 TABLET BY MOUTH ONCE AS NEEDED FOR MIGRAINE FOR UP TO 1 DOSE MAY REPEAT IN 2 HOURS IF NEEDED, Disp: , Rfl: 5  •  vitamin D (ERGOCALCIFEROL) 94032 units capsule capsule, TAKE 1 CAPSULE BY MOUTH EVERY 30 (THIRTY) DAYS, Disp: , Rfl: 1  •  acetaminophen (TYLENOL) 500 MG tablet, Take 500 mg by mouth Every 6 (Six) Hours As Needed for Mild Pain ., Disp: , Rfl:   •  cyclobenzaprine (FLEXERIL) 5 MG tablet, Take 1 tablet by mouth 2 (Two) Times a Day As Needed for Muscle Spasms., Disp: 12 tablet, Rfl: 0  •  doxycycline (MONODOX) 100 MG capsule, Take 1 capsule by mouth 2 (Two) Times a Day., Disp: 20 capsule, Rfl: 0  •  DULoxetine (CYMBALTA) 30 MG capsule, TAKE 1 CAPSULE BY ORAL ROUTE EVERY DAY, Disp: , Rfl: 1  •  liothyronine (CYTOMEL) 5 MCG tablet, Take 5 mcg by mouth Daily., Disp: , Rfl:   •  methylPREDNISolone (MEDROL, WAGNER,) 4 MG tablet, Take as directed on package instructions., Disp: 1 each, Rfl: 0  •  pantoprazole (PROTONIX) 40 MG EC tablet, TAKE 1 TABLET BY MOUTH EVERY DAY, Disp: 30 tablet, Rfl: 3  •  topiramate (TOPAMAX) 50 MG tablet, Take 50 mg by mouth 2 (Two) Times a Day., Disp: , Rfl:      Objective     History of Present Illness Keila is here for an emergency room follow-up.  She went to [facility] with complaints of chest pain and nausea and weakness on November 9.  Cardiac markers were negative.  She  was admitted and went stress testing which was normal.  She thought her symptoms were due to high calcium.  Her calcium level was normal.  She has hypoparathyroidism and surgical hypothyroidism and is followed closely by endocrinology.  She denies any symptoms of reflux or abdominal pain and nausea after eating fatty foods.    Review of Systems   Constitutional: Negative for activity change and unexpected weight change.   Respiratory: Negative for cough and shortness of breath.    Cardiovascular:        As in HPI   Gastrointestinal: Positive for nausea. Negative for abdominal pain.   Neurological: Positive for weakness.   Psychiatric/Behavioral: Negative.        Physical Exam  Constitutional:       Appearance: Normal appearance.   Cardiovascular:      Rate and Rhythm: Normal rate and regular rhythm.      Heart sounds: Normal heart sounds.   Pulmonary:      Effort: Pulmonary effort is normal.      Breath sounds: Normal breath sounds.   Neurological:      Mental Status: She is alert.   Psychiatric:         Mood and Affect: Mood normal.         Behavior: Behavior normal.         Thought Content: Thought content normal.         Judgment: Judgment normal.         ASSESSMENT     Problems Addressed this Visit        Nervous and Auditory    Other chest pain - Primary      Diagnoses       Codes Comments    Other chest pain    -  Primary ICD-10-CM: R07.89  ICD-9-CM: 786.59           PLAN  There are no Patient Instructions on file for this visit.  No follow-ups on file.

## 2020-10-15 NOTE — PATIENT INSTRUCTIONS
Reviewed and discussed hospital records and other possible causes for symptoms such as gastroesophageal reflux and gallbladder disease.

## 2020-11-19 ENCOUNTER — APPOINTMENT (OUTPATIENT)
Dept: WOMENS IMAGING | Facility: HOSPITAL | Age: 60
End: 2020-11-19

## 2020-11-19 PROCEDURE — 77063 BREAST TOMOSYNTHESIS BI: CPT | Performed by: RADIOLOGY

## 2020-11-19 PROCEDURE — 77067 SCR MAMMO BI INCL CAD: CPT | Performed by: RADIOLOGY

## 2020-11-23 ENCOUNTER — HOSPITAL ENCOUNTER (OUTPATIENT)
Dept: GENERAL RADIOLOGY | Facility: HOSPITAL | Age: 60
Discharge: HOME | End: 2020-11-23
Admitting: INTERNAL MEDICINE

## 2020-11-23 ENCOUNTER — OFFICE VISIT (OUTPATIENT)
Dept: FAMILY MEDICINE CLINIC | Facility: CLINIC | Age: 60
End: 2020-11-23

## 2020-11-23 VITALS
HEART RATE: 85 BPM | BODY MASS INDEX: 24.8 KG/M2 | SYSTOLIC BLOOD PRESSURE: 110 MMHG | RESPIRATION RATE: 18 BRPM | WEIGHT: 158 LBS | DIASTOLIC BLOOD PRESSURE: 80 MMHG | TEMPERATURE: 97.1 F | HEIGHT: 67 IN | OXYGEN SATURATION: 100 %

## 2020-11-23 DIAGNOSIS — R07.89 OTHER CHEST PAIN: Primary | ICD-10-CM

## 2020-11-23 DIAGNOSIS — M94.0 COSTOCHONDRITIS: ICD-10-CM

## 2020-11-23 PROCEDURE — 99213 OFFICE O/P EST LOW 20 MIN: CPT | Performed by: INTERNAL MEDICINE

## 2020-11-23 PROCEDURE — 71120 X-RAY EXAM BREASTBONE 2/>VWS: CPT

## 2020-11-23 RX ORDER — PANTOPRAZOLE SODIUM 40 MG/1
40 TABLET, DELAYED RELEASE ORAL DAILY
COMMUNITY
Start: 2020-10-10 | End: 2021-02-19

## 2020-11-23 RX ORDER — RIZATRIPTAN BENZOATE 10 MG/1
TABLET ORAL
COMMUNITY
Start: 2020-10-15 | End: 2021-04-13

## 2020-11-23 RX ORDER — METHYLPREDNISOLONE 4 MG/1
TABLET ORAL
Qty: 21 TABLET | Refills: 0 | Status: SHIPPED | OUTPATIENT
Start: 2020-11-23 | End: 2021-03-10

## 2020-11-23 RX ORDER — ERENUMAB-AOOE 140 MG/ML
INJECTION, SOLUTION SUBCUTANEOUS
COMMUNITY
Start: 2020-10-28 | End: 2021-04-13

## 2020-11-23 RX ORDER — DULOXETIN HYDROCHLORIDE 20 MG/1
20 CAPSULE, DELAYED RELEASE ORAL DAILY
COMMUNITY
Start: 2020-10-10 | End: 2021-03-10

## 2020-11-23 RX ORDER — TOPIRAMATE 50 MG/1
50 TABLET, FILM COATED ORAL DAILY
COMMUNITY
Start: 2020-10-22 | End: 2021-04-13

## 2020-11-23 RX ORDER — ERENUMAB-AOOE 140 MG/ML
INJECTION, SOLUTION SUBCUTANEOUS
COMMUNITY
Start: 2020-10-28

## 2020-11-23 NOTE — PATIENT INSTRUCTIONS
Recurrent chest pain is most likely secondary to inflammation of her costochondral joints.  Will obtain a sternal x-ray.  Take Medrol Dosepak as instructed.  Also suggest moist heat.

## 2020-11-23 NOTE — PROGRESS NOTES
Subjective   Keila Caldwell is a 60 y.o. female. Patient is here today for   Chief Complaint   Patient presents with   • Headache   • Chest Pain     off and on          Vitals:    11/23/20 1126   BP: 110/80   Pulse: 85   Resp: 18   Temp: 97.1 °F (36.2 °C)   SpO2: 100%     Body mass index is 24.75 kg/m².    The following portions of the patient's history were reviewed and updated as appropriate: allergies, current medications, past family history, past medical history, past social history, past surgical history and problem list.    Past Medical History:   Diagnosis Date   • Arthritis    • GERD (gastroesophageal reflux disease)    • Hypothyroidism    • Left hip pain    • Migraines     ON MED   • UTI (urinary tract infection)     CURRENTLY FINISHING AB FOR A UTI FOUND COUPLE WEEKS AGO      Allergies   Allergen Reactions   • Naproxen Other (See Comments)     Kidney dysfunction   • Nsaids Other (See Comments)     KIDNEY DYSFUNCTION      Social History     Socioeconomic History   • Marital status:      Spouse name: Not on file   • Number of children: Not on file   • Years of education: Not on file   • Highest education level: Not on file   Tobacco Use   • Smoking status: Never Smoker   • Smokeless tobacco: Never Used   Substance and Sexual Activity   • Alcohol use: Yes     Alcohol/week: 1.0 standard drinks     Types: 1 Glasses of wine per week     Comment: once a month or so   • Drug use: No   • Sexual activity: Not Currently     Partners: Male     Birth control/protection: None        Current Outpatient Medications:   •  DULoxetine (CYMBALTA) 20 MG capsule, Take 20 mg by mouth Daily., Disp: , Rfl:   •  Erenumab-aooe (Aimovig) 140 MG/ML prefilled syringe, INJECT 140 MG INTO THE SKIN EVERY 30 (THIRTY) DAYS., Disp: , Rfl:   •  pantoprazole (PROTONIX) 40 MG EC tablet, Take 40 mg by mouth Daily., Disp: , Rfl:   •  rizatriptan (MAXALT) 10 MG tablet, TAKE 1 TABLET BY MOUTH ONCE AS NEEDED FOR MIGRAINE FOR UP TO 1 DOSE  MAY REPEAT IN 2 HOURS IF NEEDED, Disp: , Rfl:   •  topiramate (TOPAMAX) 50 MG tablet, Take 50 mg by mouth Daily., Disp: , Rfl:   •  acetaminophen (TYLENOL) 500 MG tablet, Take 500 mg by mouth Every 6 (Six) Hours As Needed for Mild Pain ., Disp: , Rfl:   •  Aimovig 140 MG/ML prefilled syringe, INJECT 140 MG INTO THE SKIN EVERY 30 (THIRTY) DAYS., Disp: , Rfl:   •  AIMOVIG 70 MG/ML prefilled syringe, INJECT 1 ML INTO THE SKIN EVERY 30 (THIRTY) DAYS AS INSTRUCTED/EDUCATED., Disp: , Rfl: 5  •  calcitriol (ROCALTROL) 0.5 MCG capsule, Take 0.5 mcg by mouth 2 (Two) Times a Day., Disp: , Rfl:   •  CALCIUM PO, Take 2,000 mg by mouth 3 (Three) Times a Day. CHEWABLE TABLETS, Disp: , Rfl:   •  Calcium Polycarbophil (FIBERCON PO), Take  by mouth Daily., Disp: , Rfl:   •  CBD (cannabidiol) oral oil, Take  by mouth. 3000 mg at night, Disp: , Rfl:   •  cyclobenzaprine (FLEXERIL) 5 MG tablet, Take 1 tablet by mouth 2 (Two) Times a Day As Needed for Muscle Spasms., Disp: 12 tablet, Rfl: 0  •  doxycycline (MONODOX) 100 MG capsule, Take 1 capsule by mouth 2 (Two) Times a Day., Disp: 20 capsule, Rfl: 0  •  DULoxetine (CYMBALTA) 30 MG capsule, TAKE 1 CAPSULE BY ORAL ROUTE EVERY DAY, Disp: , Rfl: 1  •  estradiol (ESTRACE) 0.1 MG/GM vaginal cream, Insert 1 applicator into the vagina 2 (Two) Times a Week., Disp: , Rfl: 6  •  levothyroxine (SYNTHROID, LEVOTHROID) 88 MCG tablet, Take 88 mcg by mouth Daily., Disp: , Rfl:   •  liothyronine (CYTOMEL) 5 MCG tablet, TAKE 1 TABLET BY MOUTH EVERY DAY, Disp: , Rfl:   •  liothyronine (CYTOMEL) 5 MCG tablet, Take 5 mcg by mouth Daily., Disp: , Rfl:   •  Magnesium 500 MG capsule, Take  by mouth., Disp: , Rfl:   •  methylPREDNISolone (MEDROL) 4 MG dose pack, Take as directed on package instructions., Disp: 21 tablet, Rfl: 0  •  omeprazole (priLOSEC) 20 MG capsule, Take 40 mg by mouth Daily., Disp: , Rfl:   •  pantoprazole (PROTONIX) 40 MG EC tablet, TAKE 1 TABLET BY MOUTH EVERY DAY, Disp: 30 tablet, Rfl:  3  •  rizatriptan (MAXALT) 10 MG tablet, Take 10 mg by mouth As Needed. TAKE 1 TABLET BY MOUTH ONCE AS NEEDED FOR MIGRAINE FOR UP TO 1 DOSE MAY REPEAT IN 2 HOURS IF NEEDED, Disp: , Rfl: 5  •  vitamin D (ERGOCALCIFEROL) 41838 units capsule capsule, TAKE 1 CAPSULE BY MOUTH EVERY 30 (THIRTY) DAYS, Disp: , Rfl: 1     Objective     History of Present Illness Keila was seen weeks ago for a hospital follow-up.  She was evaluated at [facility] with complaints of chest pain.  Cardiac work-up was negative.  AP chest x-ray showed some atelectasis but otherwise was normal.  She continues to have intermittent sharp sternal chest pains.  She did [place] decorating yesterday and the pain was much worse today.  She cannot take nonsteroidal anti-inflammatories as she has a history of interstitial nephritis related to nonsteroidal anti-inflammatory agents.  Today she also has a headache.  She has migraine headaches that have been well controlled on Aimovig and is followed by Dr. Vazquez.    Review of Systems   Constitutional: Negative for fever.   Respiratory: Negative for cough and shortness of breath.    Cardiovascular: Positive for chest pain.   Neurological: Positive for headaches.       Physical Exam  Vitals signs reviewed.   Constitutional:       Appearance: Normal appearance.   Cardiovascular:      Rate and Rhythm: Normal rate and regular rhythm.      Heart sounds: Normal heart sounds.   Pulmonary:      Effort: Pulmonary effort is normal.      Breath sounds: Normal breath sounds.   Musculoskeletal:      Comments: There is point tenderness of the upper sternum and costochondral joints   Neurological:      Mental Status: She is alert.   Psychiatric:         Mood and Affect: Mood normal.         Behavior: Behavior normal.         Thought Content: Thought content normal.         Judgment: Judgment normal.         ASSESSMENT     Problems Addressed this Visit        Nervous and Auditory    Other chest pain - Primary     Relevant Orders    XR sternum pa and lateral       Musculoskeletal and Integument    Costochondritis      Diagnoses       Codes Comments    Other chest pain    -  Primary ICD-10-CM: R07.89  ICD-9-CM: 786.59     Costochondritis     ICD-10-CM: M94.0  ICD-9-CM: 733.6           PLAN  There are no Patient Instructions on file for this visit.  No follow-ups on file.

## 2020-11-24 ENCOUNTER — TELEPHONE (OUTPATIENT)
Dept: FAMILY MEDICINE CLINIC | Facility: CLINIC | Age: 60
End: 2020-11-24

## 2020-12-01 ENCOUNTER — TELEPHONE (OUTPATIENT)
Dept: FAMILY MEDICINE CLINIC | Facility: CLINIC | Age: 60
End: 2020-12-01

## 2020-12-01 NOTE — TELEPHONE ENCOUNTER
PT STATES SHE HAS FINISHED THE STEROID PACK, AND THE PAIN IN HER STERNUM HAS RETURNED    CALL BACK [phone]

## 2021-01-22 RX ORDER — ONDANSETRON 4 MG/1
4 TABLET, ORALLY DISINTEGRATING ORAL EVERY 8 HOURS PRN
Qty: 25 TABLET | Refills: 1 | Status: SHIPPED | OUTPATIENT
Start: 2021-01-22 | End: 2022-01-06

## 2021-02-19 ENCOUNTER — OFFICE VISIT (OUTPATIENT)
Dept: GASTROENTEROLOGY | Facility: CLINIC | Age: 61
End: 2021-02-19

## 2021-02-19 VITALS — BODY MASS INDEX: 22.94 KG/M2 | WEIGHT: 151.4 LBS | HEIGHT: 68 IN | TEMPERATURE: 98.7 F

## 2021-02-19 DIAGNOSIS — R10.13 DYSPEPSIA: Primary | ICD-10-CM

## 2021-02-19 DIAGNOSIS — R10.10 PAIN OF UPPER ABDOMEN: ICD-10-CM

## 2021-02-19 PROCEDURE — 99214 OFFICE O/P EST MOD 30 MIN: CPT | Performed by: NURSE PRACTITIONER

## 2021-02-19 RX ORDER — PANTOPRAZOLE SODIUM 40 MG/1
40 TABLET, DELAYED RELEASE ORAL DAILY
Qty: 30 TABLET | Refills: 5 | Status: SHIPPED | OUTPATIENT
Start: 2021-02-19 | End: 2021-03-10

## 2021-02-19 NOTE — PROGRESS NOTES
Chief Complaint   Patient presents with   • Nausea     HPI    Keila Caldwell is a  61 y.o. female here for a follow up visit for nausea and constipation.  This patient follows with Dr. Bowers known to me. This patient has a history of thyroidectomy 10 years ago, hypocalcemia, GERD, migraines, and urinary tract infections.     Her primary complaint on visit today is right upper quadrant and mid abdominal pain described as a mild aching sensation that comes and goes.  There is associated nausea, dyspepsia and chest discomfort.  She is had a cardiac work-up for her symptoms which included a normal stress test, normal EKG, and normal chest x-ray.  She is currently on OTC Prilosec.  She has been on this particular PPI for a number of years.  No vomiting, odynophagia, or dysphagia.    She is eating a high-fiber diet and getting adequate hydration currently no issues with constipation, diarrhea, rectal bleeding.    EGD in 2019 with grade a esophagitis.    Recent LFTs, hemogram, and TSH normal.    She is due for colonoscopy in 2024.  Past Medical History:   Diagnosis Date   • Arthritis    • GERD (gastroesophageal reflux disease)    • Hypothyroidism    • Left hip pain    • Migraines     ON MED   • UTI (urinary tract infection)     CURRENTLY FINISHING AB FOR A UTI FOUND COUPLE WEEKS AGO       Past Surgical History:   Procedure Laterality Date   • COLONOSCOPY  approx 2008    normal per pt    • COLONOSCOPY W/ POLYPECTOMY N/A 3/7/2019    One 3 mm polyp in the transverse colon. Path:Mild chronic inflammation.    • ENDOSCOPY N/A 3/7/2019    LA Grade A esophagitis   • FOOT SURGERY Right     CYST   • HYSTERECTOMY     • TONSILLECTOMY     • TOTAL HIP ARTHROPLASTY Left 3/16/2018    Procedure: LT TOTAL HIP ARTHROPLASTY ANTERIOR WITH HANA TABLE;  Surgeon: Sergio Balderas MD;  Location: [facility] OR;  Service: Orthopedics       Scheduled Meds:  Outpatient Encounter Medications as of 2/19/2021   Medication Sig Dispense Refill    • acetaminophen (TYLENOL) 500 MG tablet Take 500 mg by mouth Every 6 (Six) Hours As Needed for Mild Pain .     • Aimovig 140 MG/ML prefilled syringe INJECT 140 MG INTO THE SKIN EVERY 30 (THIRTY) DAYS.     • calcitriol (ROCALTROL) 0.5 MCG capsule Take 0.5 mcg by mouth 2 (Two) Times a Day.     • CALCIUM PO Take 2,000 mg by mouth 3 (Three) Times a Day. CHEWABLE TABLETS     • CBD (cannabidiol) oral oil Take  by mouth. 3000 mg at night     • estradiol (ESTRACE) 0.1 MG/GM vaginal cream Insert 1 applicator into the vagina 2 (Two) Times a Week.  6   • levothyroxine (SYNTHROID, LEVOTHROID) 88 MCG tablet Take 88 mcg by mouth Daily.     • liothyronine (CYTOMEL) 5 MCG tablet TAKE 1 TABLET BY MOUTH EVERY DAY     • Magnesium 500 MG capsule Take  by mouth.     • omeprazole (priLOSEC) 20 MG capsule Take 40 mg by mouth Daily.     • ondansetron ODT (Zofran ODT) 4 MG disintegrating tablet Place 1 tablet on the tongue Every 8 (Eight) Hours As Needed for Nausea or Vomiting. 25 tablet 1   • rizatriptan (MAXALT) 10 MG tablet TAKE 1 TABLET BY MOUTH ONCE AS NEEDED FOR MIGRAINE FOR UP TO 1 DOSE MAY REPEAT IN 2 HOURS IF NEEDED     • vitamin D (ERGOCALCIFEROL) 29926 units capsule capsule TAKE 1 CAPSULE BY MOUTH EVERY 30 (THIRTY) DAYS  1   • AIMOVIG 70 MG/ML prefilled syringe INJECT 1 ML INTO THE SKIN EVERY 30 (THIRTY) DAYS AS INSTRUCTED/EDUCATED.  5   • Calcium Polycarbophil (FIBERCON PO) Take  by mouth Daily.     • cyclobenzaprine (FLEXERIL) 5 MG tablet Take 1 tablet by mouth 2 (Two) Times a Day As Needed for Muscle Spasms. 12 tablet 0   • doxycycline (MONODOX) 100 MG capsule Take 1 capsule by mouth 2 (Two) Times a Day. 20 capsule 0   • DULoxetine (CYMBALTA) 20 MG capsule Take 20 mg by mouth Daily.     • DULoxetine (CYMBALTA) 30 MG capsule TAKE 1 CAPSULE BY ORAL ROUTE EVERY DAY  1   • Erenumab-aooe (Aimovig) 140 MG/ML prefilled syringe INJECT 140 MG INTO THE SKIN EVERY 30 (THIRTY) DAYS.     • liothyronine (CYTOMEL) 5 MCG tablet Take 5 mcg  by mouth Daily.     • methylPREDNISolone (MEDROL) 4 MG dose pack Take as directed on package instructions. 21 tablet 0   • pantoprazole (PROTONIX) 40 MG EC tablet TAKE 1 TABLET BY MOUTH EVERY DAY 30 tablet 3   • pantoprazole (PROTONIX) 40 MG EC tablet Take 40 mg by mouth Daily.     • rizatriptan (MAXALT) 10 MG tablet Take 10 mg by mouth As Needed. TAKE 1 TABLET BY MOUTH ONCE AS NEEDED FOR MIGRAINE FOR UP TO 1 DOSE MAY REPEAT IN 2 HOURS IF NEEDED  5   • topiramate (TOPAMAX) 50 MG tablet Take 50 mg by mouth Daily.       No facility-administered encounter medications on file as of 2/19/2021.        Continuous Infusions:No current facility-administered medications for this visit.       PRN Meds:.    Allergies   Allergen Reactions   • Naproxen Other (See Comments)     Kidney dysfunction   • Nsaids Other (See Comments)     KIDNEY DYSFUNCTION       Social History     Socioeconomic History   • Marital status:      Spouse name: Not on file   • Number of children: Not on file   • Years of education: Not on file   • Highest education level: Not on file   Tobacco Use   • Smoking status: Never Smoker   • Smokeless tobacco: Never Used   Substance and Sexual Activity   • Alcohol use: Yes     Alcohol/week: 1.0 standard drinks     Types: 1 Glasses of wine per week     Comment: once a month or so   • Drug use: No   • Sexual activity: Not Currently     Partners: Male     Birth control/protection: None       Family History   Problem Relation Age of Onset   • No Known Problems Mother    • Stomach cancer Father    • Colon polyps Father    • No Known Problems Sister    • No Known Problems Brother    • No Known Problems Son    • No Known Problems Daughter    • No Known Problems Maternal Grandmother    • No Known Problems Maternal Grandfather    • No Known Problems Paternal Grandmother    • No Known Problems Paternal Grandfather    • No Known Problems Cousin    • Malig Hyperthermia Neg Hx        Review of Systems   Constitutional:  Negative for activity change, appetite change, fatigue, fever and unexpected weight change.   HENT: Negative for trouble swallowing.    Respiratory: Negative for apnea, cough, choking, chest tightness, shortness of breath and wheezing.    Cardiovascular: Negative for chest pain, palpitations and leg swelling.   Gastrointestinal: Positive for abdominal pain and nausea. Negative for abdominal distention, anal bleeding, blood in stool, constipation, diarrhea, rectal pain and vomiting.       Vitals:    02/19/21 0902   Temp: 98.7 °F (37.1 °C)       Physical Exam  Constitutional:       Appearance: She is well-developed.   Cardiovascular:      Rate and Rhythm: Normal rate and regular rhythm.      Heart sounds: Normal heart sounds.   Pulmonary:      Effort: Pulmonary effort is normal. No respiratory distress.      Breath sounds: Normal breath sounds. No wheezing.   Abdominal:      General: Bowel sounds are normal. There is no distension.      Palpations: Abdomen is soft. There is no mass.      Tenderness: There is no abdominal tenderness. There is no guarding.      Hernia: No hernia is present.   Skin:     General: Skin is warm and dry.   Neurological:      Mental Status: She is alert and oriented to person, place, and time.   Psychiatric:         Behavior: Behavior normal.     Assessment    Right upper quadrant abdominal pain  Mid abdominal pain  Dyspepsia  Nausea  History esophagitis    Plan    Strict antireflux dietary precautions reviewed with the patient.  Stop Prilosec.  Start prescription strength Protonix once daily.  Arrange gallbladder work-up to include ultrasound and HIDA scan.  If gallbladder work-up found to be unremarkable consider repeating her EGD with Dr. Bowers.  Follow-up and further recommendations pending the aforementioned work-up.

## 2021-03-02 ENCOUNTER — HOSPITAL ENCOUNTER (OUTPATIENT)
Dept: ULTRASOUND IMAGING | Facility: HOSPITAL | Age: 61
Discharge: HOME | End: 2021-03-02
Admitting: NURSE PRACTITIONER

## 2021-03-02 ENCOUNTER — HOSPITAL ENCOUNTER (OUTPATIENT)
Dept: NUCLEAR MEDICINE | Facility: HOSPITAL | Age: 61
Discharge: HOME | End: 2021-03-02

## 2021-03-02 DIAGNOSIS — R10.13 DYSPEPSIA: ICD-10-CM

## 2021-03-02 DIAGNOSIS — R10.10 PAIN OF UPPER ABDOMEN: ICD-10-CM

## 2021-03-02 PROCEDURE — A9537 TC99M MEBROFENIN: HCPCS | Performed by: NURSE PRACTITIONER

## 2021-03-02 PROCEDURE — 78227 HEPATOBIL SYST IMAGE W/DRUG: CPT

## 2021-03-02 PROCEDURE — 25010000002 SINCALIDE PER 5 MCG: Performed by: NURSE PRACTITIONER

## 2021-03-02 PROCEDURE — 0 TECHNETIUM TC 99M MEBROFENIN KIT: Performed by: NURSE PRACTITIONER

## 2021-03-02 PROCEDURE — 76705 ECHO EXAM OF ABDOMEN: CPT

## 2021-03-02 RX ORDER — KIT FOR THE PREPARATION OF TECHNETIUM TC 99M MEBROFENIN 45 MG/10ML
1 INJECTION, POWDER, LYOPHILIZED, FOR SOLUTION INTRAVENOUS
Status: COMPLETED | OUTPATIENT
Start: 2021-03-02 | End: 2021-03-02

## 2021-03-02 RX ADMIN — SINCALIDE 1.4 MCG: 5 INJECTION, POWDER, LYOPHILIZED, FOR SOLUTION INTRAVENOUS at 08:55

## 2021-03-02 RX ADMIN — MEBROFENIN 1 DOSE: 45 INJECTION, POWDER, LYOPHILIZED, FOR SOLUTION INTRAVENOUS at 07:45

## 2021-03-02 NOTE — PROGRESS NOTES
Please inform the patient that gallbladder work-up shows normal functioning gallbladder and no evidence of gallstones.  Lets arrange a follow-up in 4 to 6 weeks to discuss further.

## 2021-03-10 ENCOUNTER — OFFICE VISIT (OUTPATIENT)
Dept: GASTROENTEROLOGY | Facility: CLINIC | Age: 61
End: 2021-03-10

## 2021-03-10 VITALS — BODY MASS INDEX: 22.88 KG/M2 | TEMPERATURE: 97.8 F | HEIGHT: 68 IN | WEIGHT: 151 LBS

## 2021-03-10 DIAGNOSIS — R10.10 PAIN OF UPPER ABDOMEN: ICD-10-CM

## 2021-03-10 DIAGNOSIS — R10.13 DYSPEPSIA: Primary | ICD-10-CM

## 2021-03-10 DIAGNOSIS — Z87.19 HISTORY OF ESOPHAGITIS: ICD-10-CM

## 2021-03-10 PROCEDURE — 99214 OFFICE O/P EST MOD 30 MIN: CPT | Performed by: NURSE PRACTITIONER

## 2021-03-10 RX ORDER — PANTOPRAZOLE SODIUM 40 MG/1
40 TABLET, DELAYED RELEASE ORAL 2 TIMES DAILY
Qty: 60 TABLET | Refills: 5 | Status: SHIPPED | OUTPATIENT
Start: 2021-03-10 | End: 2021-06-14

## 2021-03-10 NOTE — PROGRESS NOTES
Chief Complaint   Patient presents with   • Abdominal Pain     HPI    Keila Caldwell is a  61 y.o. female here for a follow up visit for abdominal pain. This patient follows with Dr. Bowers known to me. This patient has a history of thyroidectomy 10 years ago, hypocalcemia, GERD, migraines, and urinary tract infections. She was transition to prescription strength Protonix and recommended to have a gallbladder evaluation on last office visit.  She is here today to follow-up.  Recent HIDA scan and gallbladder ultrasound was normal.    Today reports change to prescription strength Protonix once daily did alleviate nausea but she still having epigastric pain described as an aching sensation that seems to be made worse with eating.  No vomiting.  No odynophagia or dysphagia.  Pain described as a burning sensation that comes and goes.  She has made major dietary changes she seems to improve with small portion meals.  She has lost around 3 pounds with dietary changes.  Reported element of early satiety.    She currently denies diarrhea, constipation, or rectal bleeding.    EGD in 2019 with grade a esophagitis.  She is due for screening colonoscopy in 2024.  Past Medical History:   Diagnosis Date   • Arthritis    • GERD (gastroesophageal reflux disease)    • Hypothyroidism    • Left hip pain    • Migraines     ON MED   • UTI (urinary tract infection)     CURRENTLY FINISHING AB FOR A UTI FOUND COUPLE WEEKS AGO       Past Surgical History:   Procedure Laterality Date   • COLONOSCOPY  approx 2008    normal per pt    • COLONOSCOPY W/ POLYPECTOMY N/A 3/7/2019    One 3 mm polyp in the transverse colon. Path:Mild chronic inflammation.    • ENDOSCOPY N/A 3/7/2019    LA Grade A esophagitis   • FOOT SURGERY Right     CYST   • HYSTERECTOMY     • TONSILLECTOMY     • TOTAL HIP ARTHROPLASTY Left 3/16/2018    Procedure: LT TOTAL HIP ARTHROPLASTY ANTERIOR WITH HANA TABLE;  Surgeon: Sergio Balderas MD;  Location: [facility] OR;   Service: Orthopedics       Scheduled Meds:  Outpatient Encounter Medications as of 3/10/2021   Medication Sig Dispense Refill   • acetaminophen (TYLENOL) 500 MG tablet Take 500 mg by mouth Every 6 (Six) Hours As Needed for Mild Pain .     • Aimovig 140 MG/ML prefilled syringe INJECT 140 MG INTO THE SKIN EVERY 30 (THIRTY) DAYS.     • AIMOVIG 70 MG/ML prefilled syringe INJECT 1 ML INTO THE SKIN EVERY 30 (THIRTY) DAYS AS INSTRUCTED/EDUCATED.  5   • calcitriol (ROCALTROL) 0.5 MCG capsule Take 0.5 mcg by mouth 2 (Two) Times a Day.     • CALCIUM PO Take 2,000 mg by mouth 3 (Three) Times a Day. CHEWABLE TABLETS     • CBD (cannabidiol) oral oil Take  by mouth. 3000 mg at night     • Erenumab-aooe (Aimovig) 140 MG/ML prefilled syringe INJECT 140 MG INTO THE SKIN EVERY 30 (THIRTY) DAYS.     • estradiol (ESTRACE) 0.1 MG/GM vaginal cream Insert 1 applicator into the vagina 2 (Two) Times a Week.  6   • levothyroxine (SYNTHROID, LEVOTHROID) 88 MCG tablet Take 88 mcg by mouth Daily.     • liothyronine (CYTOMEL) 5 MCG tablet TAKE 1 TABLET BY MOUTH EVERY DAY     • liothyronine (CYTOMEL) 5 MCG tablet Take 5 mcg by mouth Daily.     • Magnesium 500 MG capsule Take  by mouth.     • rizatriptan (MAXALT) 10 MG tablet Take 10 mg by mouth As Needed. TAKE 1 TABLET BY MOUTH ONCE AS NEEDED FOR MIGRAINE FOR UP TO 1 DOSE MAY REPEAT IN 2 HOURS IF NEEDED  5   • rizatriptan (MAXALT) 10 MG tablet TAKE 1 TABLET BY MOUTH ONCE AS NEEDED FOR MIGRAINE FOR UP TO 1 DOSE MAY REPEAT IN 2 HOURS IF NEEDED     • vitamin D (ERGOCALCIFEROL) 23618 units capsule capsule TAKE 1 CAPSULE BY MOUTH EVERY 30 (THIRTY) DAYS  1   • [DISCONTINUED] pantoprazole (PROTONIX) 40 MG EC tablet Take 1 tablet by mouth Daily. 30 tablet 5   • cyclobenzaprine (FLEXERIL) 5 MG tablet Take 1 tablet by mouth 2 (Two) Times a Day As Needed for Muscle Spasms. 12 tablet 0   • doxycycline (MONODOX) 100 MG capsule Take 1 capsule by mouth 2 (Two) Times a Day. 20 capsule 0   • ondansetron ODT  (Zofran ODT) 4 MG disintegrating tablet Place 1 tablet on the tongue Every 8 (Eight) Hours As Needed for Nausea or Vomiting. 25 tablet 1   • pantoprazole (PROTONIX) 40 MG EC tablet Take 1 tablet by mouth 2 (Two) Times a Day. 60 tablet 5   • topiramate (TOPAMAX) 50 MG tablet Take 50 mg by mouth Daily.     • [DISCONTINUED] Calcium Polycarbophil (FIBERCON PO) Take  by mouth Daily.     • [DISCONTINUED] DULoxetine (CYMBALTA) 20 MG capsule Take 20 mg by mouth Daily.     • [DISCONTINUED] DULoxetine (CYMBALTA) 30 MG capsule TAKE 1 CAPSULE BY ORAL ROUTE EVERY DAY  1   • [DISCONTINUED] methylPREDNISolone (MEDROL) 4 MG dose pack Take as directed on package instructions. 21 tablet 0     No facility-administered encounter medications on file as of 3/10/2021.       Continuous Infusions:No current facility-administered medications for this visit.      PRN Meds:.    Allergies   Allergen Reactions   • Naproxen Other (See Comments)     Kidney dysfunction   • Nsaids Other (See Comments)     KIDNEY DYSFUNCTION       Social History     Socioeconomic History   • Marital status:      Spouse name: Not on file   • Number of children: Not on file   • Years of education: Not on file   • Highest education level: Not on file   Tobacco Use   • Smoking status: Never Smoker   • Smokeless tobacco: Never Used   Substance and Sexual Activity   • Alcohol use: Yes     Alcohol/week: 1.0 standard drinks     Types: 1 Glasses of wine per week     Comment: once a month or so   • Drug use: No   • Sexual activity: Not Currently     Partners: Male     Birth control/protection: None       Family History   Problem Relation Age of Onset   • No Known Problems Mother    • Stomach cancer Father    • Colon polyps Father    • No Known Problems Sister    • No Known Problems Brother    • No Known Problems Son    • No Known Problems Daughter    • No Known Problems Maternal Grandmother    • No Known Problems Maternal Grandfather    • No Known Problems Paternal  Grandmother    • No Known Problems Paternal Grandfather    • No Known Problems Cousin    • Malig Hyperthermia Neg Hx        Review of Systems   Constitutional: Negative for activity change, appetite change, fatigue, fever and unexpected weight change.   HENT: Negative for trouble swallowing.    Respiratory: Negative for apnea, cough, choking, chest tightness, shortness of breath and wheezing.    Cardiovascular: Negative for chest pain, palpitations and leg swelling.   Gastrointestinal: Positive for abdominal pain and nausea. Negative for abdominal distention, anal bleeding, blood in stool, constipation, diarrhea, rectal pain and vomiting.       Vitals:    03/10/21 1100   Temp: 97.8 °F (36.6 °C)       Physical Exam  Constitutional:       Appearance: She is well-developed.   Cardiovascular:      Rate and Rhythm: Normal rate and regular rhythm.      Heart sounds: Normal heart sounds.   Pulmonary:      Effort: Pulmonary effort is normal. No respiratory distress.      Breath sounds: Normal breath sounds. No wheezing.   Abdominal:      General: Bowel sounds are normal. There is no distension.      Palpations: Abdomen is soft. There is no mass.      Tenderness: There is no abdominal tenderness. There is no guarding.      Hernia: No hernia is present.   Neurological:      Mental Status: She is alert and oriented to person, place, and time.   Psychiatric:         Behavior: Behavior normal.     Assessment    Epigastric pain  Nausea  History of esophagitis    Plan    Arrange EGD with Dr. Bowers for further evaluation of patient's symptoms.  Reviewed recent gallbladder work-up with the patient all questions were answered.  Increase Protonix to twice daily dosing.  Continue with dietary modifications.  Consider gastric view study in the future if EGD found to be unremarkable due to mild element of early satiety.  Further recommendations and follow-up pending the aforementioned work-up.

## 2021-03-23 ENCOUNTER — TELEPHONE (OUTPATIENT)
Dept: GASTROENTEROLOGY | Facility: CLINIC | Age: 61
End: 2021-03-23

## 2021-03-23 NOTE — TELEPHONE ENCOUNTER
----- Message from No Childers Rep sent at 3/23/2021 11:39 AM EDT -----  Regarding: med  Contact: [phone]  Pt states that pharmacy will not fill until they speak to someone in out office  , This is a new dosage       pantoprazole (PROTONIX) 40 MG EC tablet         [facility]/pharmacy #[id number] - [place], [place] - [address] AT [facility] - [phone]  - [phone]    [address], [place] [address]   Phone:  [phone]  Fax:  [phone]

## 2021-03-23 NOTE — TELEPHONE ENCOUNTER
PA for Pantoprazole 40mg BID sent to [facility] via [facility]. Awaiting plan response.

## 2021-03-29 ENCOUNTER — TELEPHONE (OUTPATIENT)
Dept: GASTROENTEROLOGY | Facility: CLINIC | Age: 61
End: 2021-03-29

## 2021-03-30 ENCOUNTER — BULK ORDERING (OUTPATIENT)
Dept: CASE MANAGEMENT | Facility: OTHER | Age: 61
End: 2021-03-30

## 2021-03-30 DIAGNOSIS — Z23 IMMUNIZATION DUE: ICD-10-CM

## 2021-04-02 ENCOUNTER — TELEPHONE (OUTPATIENT)
Dept: GASTROENTEROLOGY | Facility: CLINIC | Age: 61
End: 2021-04-02

## 2021-04-02 NOTE — TELEPHONE ENCOUNTER
----- Message from YARELIS Bunch sent at 3/2/2021 11:40 AM EST -----  Please inform the patient that gallbladder work-up shows normal functioning gallbladder and no evidence of gallstones.  Lets arrange a follow-up in 4 to 6 weeks to discuss further.

## 2021-04-05 ENCOUNTER — TRANSCRIBE ORDERS (OUTPATIENT)
Dept: SLEEP MEDICINE | Facility: HOSPITAL | Age: 61
End: 2021-04-05

## 2021-04-05 DIAGNOSIS — Z01.818 OTHER SPECIFIED PRE-OPERATIVE EXAMINATION: Primary | ICD-10-CM

## 2021-04-12 ENCOUNTER — LAB (OUTPATIENT)
Dept: LAB | Facility: HOSPITAL | Age: 61
End: 2021-04-12

## 2021-04-12 DIAGNOSIS — Z01.818 OTHER SPECIFIED PRE-OPERATIVE EXAMINATION: ICD-10-CM

## 2021-04-12 PROCEDURE — U0004 COV-19 TEST NON-CDC HGH THRU: HCPCS

## 2021-04-12 PROCEDURE — C9803 HOPD COVID-19 SPEC COLLECT: HCPCS

## 2021-04-13 LAB — SARS-COV-2 RNA RESP QL NAA+PROBE: NOT DETECTED

## 2021-04-14 ENCOUNTER — HOSPITAL ENCOUNTER (OUTPATIENT)
Facility: HOSPITAL | Age: 61
Setting detail: HOSPITAL OUTPATIENT SURGERY
Discharge: HOME | End: 2021-04-14
Attending: INTERNAL MEDICINE | Admitting: INTERNAL MEDICINE

## 2021-04-14 ENCOUNTER — ANESTHESIA EVENT (OUTPATIENT)
Dept: GASTROENTEROLOGY | Facility: HOSPITAL | Age: 61
End: 2021-04-14

## 2021-04-14 ENCOUNTER — ANESTHESIA (OUTPATIENT)
Dept: GASTROENTEROLOGY | Facility: HOSPITAL | Age: 61
End: 2021-04-14

## 2021-04-14 VITALS
HEIGHT: 68 IN | SYSTOLIC BLOOD PRESSURE: 127 MMHG | OXYGEN SATURATION: 100 % | DIASTOLIC BLOOD PRESSURE: 85 MMHG | BODY MASS INDEX: 22.64 KG/M2 | RESPIRATION RATE: 16 BRPM | WEIGHT: 149.4 LBS | HEART RATE: 67 BPM

## 2021-04-14 DIAGNOSIS — R10.13 DYSPEPSIA: ICD-10-CM

## 2021-04-14 DIAGNOSIS — R10.10 PAIN OF UPPER ABDOMEN: ICD-10-CM

## 2021-04-14 DIAGNOSIS — Z87.19 HISTORY OF ESOPHAGITIS: ICD-10-CM

## 2021-04-14 PROCEDURE — 88305 TISSUE EXAM BY PATHOLOGIST: CPT | Performed by: INTERNAL MEDICINE

## 2021-04-14 PROCEDURE — 25010000002 PROPOFOL 10 MG/ML EMULSION: Performed by: ANESTHESIOLOGY

## 2021-04-14 PROCEDURE — 43239 EGD BIOPSY SINGLE/MULTIPLE: CPT | Performed by: INTERNAL MEDICINE

## 2021-04-14 RX ORDER — SODIUM CHLORIDE, SODIUM LACTATE, POTASSIUM CHLORIDE, CALCIUM CHLORIDE 600; 310; 30; 20 MG/100ML; MG/100ML; MG/100ML; MG/100ML
30 INJECTION, SOLUTION INTRAVENOUS CONTINUOUS PRN
Status: DISCONTINUED | OUTPATIENT
Start: 2021-04-14 | End: 2021-04-14 | Stop reason: HOSPADM

## 2021-04-14 RX ORDER — SODIUM CHLORIDE 9 MG/ML
30 INJECTION, SOLUTION INTRAVENOUS CONTINUOUS PRN
Status: DISCONTINUED | OUTPATIENT
Start: 2021-04-14 | End: 2021-04-14

## 2021-04-14 RX ORDER — LIDOCAINE HYDROCHLORIDE 20 MG/ML
INJECTION, SOLUTION INFILTRATION; PERINEURAL AS NEEDED
Status: DISCONTINUED | OUTPATIENT
Start: 2021-04-14 | End: 2021-04-14 | Stop reason: SURG

## 2021-04-14 RX ORDER — PROPOFOL 10 MG/ML
VIAL (ML) INTRAVENOUS CONTINUOUS PRN
Status: DISCONTINUED | OUTPATIENT
Start: 2021-04-14 | End: 2021-04-14 | Stop reason: SURG

## 2021-04-14 RX ORDER — PROPOFOL 10 MG/ML
VIAL (ML) INTRAVENOUS AS NEEDED
Status: DISCONTINUED | OUTPATIENT
Start: 2021-04-14 | End: 2021-04-14 | Stop reason: SURG

## 2021-04-14 RX ADMIN — LIDOCAINE HYDROCHLORIDE 60 MG: 20 INJECTION, SOLUTION INFILTRATION; PERINEURAL at 13:31

## 2021-04-14 RX ADMIN — PROPOFOL 120 MG: 10 INJECTION, EMULSION INTRAVENOUS at 13:31

## 2021-04-14 RX ADMIN — PROPOFOL 300 MCG/KG/MIN: 10 INJECTION, EMULSION INTRAVENOUS at 13:31

## 2021-04-14 RX ADMIN — SODIUM CHLORIDE, POTASSIUM CHLORIDE, SODIUM LACTATE AND CALCIUM CHLORIDE 30 ML/HR: 600; 310; 30; 20 INJECTION, SOLUTION INTRAVENOUS at 13:24

## 2021-04-14 NOTE — H&P
[facility]  Pre Procedure History & Physical    Chief Complaint:   RUQ pain    Subjective     HPI:   Keila Caldwell is a  61 y.o. female here for a follow up visit for nausea and constipation.  This patient follows with Dr. Bowers known to me. This patient has a history of thyroidectomy 10 years ago, hypocalcemia, GERD, migraines, and urinary tract infections.      Her primary complaint on visit today is right upper quadrant and mid abdominal pain described as a mild aching sensation that comes and goes.  There is associated nausea, dyspepsia and chest discomfort.  She is had a cardiac work-up for her symptoms which included a normal stress test, normal EKG, and normal chest x-ray.  She is currently on OTC Prilosec.  She has been on this particular PPI for a number of years.  No vomiting, odynophagia, or dysphagia.     She is eating a high-fiber diet and getting adequate hydration currently no issues with constipation, diarrhea, rectal bleeding.     EGD in 2019 with grade a esophagitis.       Past Medical History:   Past Medical History:   Diagnosis Date   • Arthritis    • Chronic nausea    • GERD (gastroesophageal reflux disease)    • History of UTI    • Hypothyroidism    • Migraines     ON MED   • PONV (postoperative nausea and vomiting)        Past Surgical History:  Past Surgical History:   Procedure Laterality Date   • COLONOSCOPY  approx 2008    normal per pt    • COLONOSCOPY W/ POLYPECTOMY N/A 3/7/2019    One 3 mm polyp in the transverse colon. Path:Mild chronic inflammation.    • ENDOSCOPY N/A 3/7/2019    LA Grade A esophagitis   • FOOT SURGERY Right     CYST   • HYSTERECTOMY     • TONSILLECTOMY     • TOTAL HIP ARTHROPLASTY Left 3/16/2018    Procedure: LT TOTAL HIP ARTHROPLASTY ANTERIOR WITH HANA TABLE;  Surgeon: Sergio Balderas MD;  Location: [facility];  Service: Orthopedics       Family History:  Family History   Problem Relation Age of Onset   • No Known Problems  Mother    • Stomach cancer Father    • Colon polyps Father    • No Known Problems Sister    • No Known Problems Brother    • No Known Problems Son    • No Known Problems Daughter    • No Known Problems Maternal Grandmother    • No Known Problems Maternal Grandfather    • No Known Problems Paternal Grandmother    • No Known Problems Paternal Grandfather    • No Known Problems Cousin    • Malig Hyperthermia Neg Hx        Social History:   reports that she has never smoked. She has never used smokeless tobacco. She reports current alcohol use of about 1.0 standard drinks of alcohol per week. She reports that she does not use drugs.    Medications:   Medications Prior to Admission   Medication Sig Dispense Refill Last Dose   • Aimovig 140 MG/ML prefilled syringe INJECT 140 MG INTO THE SKIN EVERY 30 (THIRTY) DAYS.   4/1/2021   • calcitriol (ROCALTROL) 0.5 MCG capsule Take 0.5 mcg by mouth 2 (Two) Times a Day.   4/13/2021   • CALCIUM PO Take 1,000 mg by mouth 6 (Six) Times a Day. CHEWABLE TABLETS   4/13/2021   • CBD (cannabidiol) oral oil Take  by mouth. 3000 mg at night   4/13/2021   • COVID-19 mRNA Vaccine, Moderna, (Moderna COVID-19 Vaccine) 100 MCG/0.5ML suspension Inject 0.5 mL into the appropriate muscle as directed by prescriber 1 (One) Time. 2ND DOSE   3/5/2021   • estradiol (ESTRACE) 0.1 MG/GM vaginal cream Insert 1 applicator into the vagina 2 (Two) Times a Week.  6 4/11/2021   • levothyroxine (SYNTHROID, LEVOTHROID) 88 MCG tablet Take 88 mcg by mouth Daily.   4/14/2021 at Unknown time   • liothyronine (CYTOMEL) 5 MCG tablet Take 5 mcg by mouth Daily.   4/14/2021 at Unknown time   • Magnesium 500 MG capsule Take 1 capsule by mouth Daily.   4/13/2021   • NON FORMULARY Cortisone injection for right hip   3/31/2021   • ondansetron ODT (Zofran ODT) 4 MG disintegrating tablet Place 1 tablet on the tongue Every 8 (Eight) Hours As Needed for Nausea or Vomiting. 25 tablet 1 4/11/2021   • pantoprazole (PROTONIX) 40 MG EC  "tablet Take 1 tablet by mouth 2 (Two) Times a Day. (Patient taking differently: Take 40 mg by mouth Every Morning.) 60 tablet 5 4/13/2021 at Unknown time   • Red Yeast Rice Extract (RED YEAST RICE PO) Take 1 tablet by mouth Daily.   4/12/2021   • vitamin D (ERGOCALCIFEROL) 92252 units capsule capsule Take 50,000 Units by mouth Every 30 (Thirty) Days.  1 4/1/2021   • acetaminophen (TYLENOL) 500 MG tablet Take 500 mg by mouth Every 6 (Six) Hours As Needed for Mild Pain .   4/11/2021 at Unknown time   • rizatriptan (MAXALT) 10 MG tablet Take 10 mg by mouth As Needed. TAKE 1 TABLET BY MOUTH ONCE AS NEEDED FOR MIGRAINE FOR UP TO 1 DOSE MAY REPEAT IN 2 HOURS IF NEEDED  5 More than a month at Unknown time       Allergies:  Nsaids    ROS:    Pertinent items are noted in HPI     Objective     Height 172.7 cm (68\"), weight 67.8 kg (149 lb 6.4 oz), not currently breastfeeding.    Physical Exam   Constitutional: Pt is oriented to person, place, and time and well-developed, well-nourished, and in no distress.   Mouth/Throat: Oropharynx is clear and moist.   Neck: Normal range of motion.   Cardiovascular: Normal rate, regular rhythm and normal heart sounds.    Pulmonary/Chest: Effort normal and breath sounds normal.   Abdominal: Soft. Nontender  Skin: Skin is warm and dry.   Psychiatric: Mood, memory, affect and judgment normal.     Assessment/Plan     Diagnosis:  RUQ pain    Anticipated Surgical Procedure:  EGD    The risks, benefits, and alternatives of this procedure have been discussed with the patient or the responsible party- the patient understands and agrees to proceed.                                                          "

## 2021-04-14 NOTE — ANESTHESIA PREPROCEDURE EVALUATION
Anesthesia Evaluation     Patient summary reviewed and Nursing notes reviewed   history of anesthetic complications: PONV  NPO Solid Status: > 8 hours  NPO Liquid Status: > 2 hours           Airway   Mallampati: II  TM distance: >3 FB  Neck ROM: full  Dental - normal exam     Pulmonary - normal exam    breath sounds clear to auscultation  (+) recent URI,   Cardiovascular - negative cardio ROS and normal exam    ECG reviewed  Rhythm: regular  Rate: normal      ROS comment: SINUS RHYTHM  NO SIGNIFICANT CHANGE FROM PREVIOUS ECG    Neuro/Psych  (+) headaches (Migraines),     GI/Hepatic/Renal/Endo    (+)  GERD,  thyroid problem hypothyroidism    ROS Comment: Hypoparathyroidism    Musculoskeletal     Abdominal    Substance History      OB/GYN          Other   arthritis,                        Anesthesia Plan    ASA 2     MAC       Anesthetic plan, all risks, benefits, and alternatives have been provided, discussed and informed consent has been obtained with: patient.

## 2021-04-14 NOTE — ANESTHESIA POSTPROCEDURE EVALUATION
Patient: Keila Caldwell    Procedure Summary     Date: 04/14/21 Room / Location:  [facility] /  [facility]    Anesthesia Start: 1328 Anesthesia Stop: 1343    Procedure: ESOPHAGOGASTRODUODENOSCOPY WITH BX (N/A Esophagus) Diagnosis:       Dyspepsia      Pain of upper abdomen      History of esophagitis      (Dyspepsia [R10.13])      (Pain of upper abdomen [R10.10])      (History of esophagitis [Z87.19])    Surgeons: Aravind Bowers MD Provider: Berto James MD    Anesthesia Type: MAC ASA Status: 2          Anesthesia Type: MAC    Vitals  No vitals data found for the desired time range.          Post Anesthesia Care and Evaluation    Patient location during evaluation: PHASE II  Patient participation: complete - patient participated  Level of consciousness: awake and alert  Pain management: adequate  Airway patency: patent  Anesthetic complications: No anesthetic complications  PONV Status: none  Cardiovascular status: acceptable and hemodynamically stable  Respiratory status: acceptable, nonlabored ventilation and spontaneous ventilation  Hydration status: acceptable

## 2021-04-15 LAB
LAB AP CASE REPORT: NORMAL
PATH REPORT.FINAL DX SPEC: NORMAL
PATH REPORT.GROSS SPEC: NORMAL

## 2021-04-20 ENCOUNTER — TELEPHONE (OUTPATIENT)
Dept: GASTROENTEROLOGY | Facility: CLINIC | Age: 61
End: 2021-04-20

## 2021-04-20 DIAGNOSIS — R10.9 ABDOMINAL PAIN, UNSPECIFIED ABDOMINAL LOCATION: Primary | ICD-10-CM

## 2021-04-20 NOTE — TELEPHONE ENCOUNTER
----- Message from No Finney sent at 4/20/2021  9:38 AM EDT -----  Regarding: nausea  Contact: [phone]  Pt is experiencing worsening nausea, no appetitie, weight loss since 4/14/21 scope. Please call pt to advise. Thank you

## 2021-04-20 NOTE — TELEPHONE ENCOUNTER
Per Corazon, telephone : PT state she is starting to have sharp pains in between her shoulder blades.  Update to Rocio.

## 2021-04-20 NOTE — TELEPHONE ENCOUNTER
Returned patient's phone call. She states she continues with nausea, diminished appetite, and weakness.   She reports she had to call sick to work today.   Yesterday she states she was hot and clammy, did not take her temperature.   She states her bowels are moving with Miralax. The last time they moved was yesterday.   States she has lost 2 lbs since her scope last week. Advised an update will be sent to Rocio. She verb understanding.

## 2021-04-20 NOTE — TELEPHONE ENCOUNTER
Please schedule the patient for CT scan A/P with Iv and oral contrast.      The pathology from her EGD was benign, she did not have celiac disease or H pylori, nothing to explain her symptoms.

## 2021-04-22 ENCOUNTER — TELEPHONE (OUTPATIENT)
Dept: GASTROENTEROLOGY | Facility: CLINIC | Age: 61
End: 2021-04-22

## 2021-04-22 NOTE — TELEPHONE ENCOUNTER
Requesting that someone call me regarding scope results from 21 and treatment for continued nausea and fatigue after eating, drinking or taking medication.     Also requesting that all patient documents for Keila Vinesump ( 1960), including all test results, diagnosis and treatments be faxed to  [phone] ASAP.

## 2021-05-05 ENCOUNTER — HOSPITAL ENCOUNTER (OUTPATIENT)
Dept: CT IMAGING | Facility: HOSPITAL | Age: 61
Discharge: HOME | End: 2021-05-05
Admitting: INTERNAL MEDICINE

## 2021-05-05 DIAGNOSIS — R10.9 ABDOMINAL PAIN, UNSPECIFIED ABDOMINAL LOCATION: ICD-10-CM

## 2021-05-05 LAB — CREAT BLDA-MCNC: 1.8 MG/DL (ref 0.6–1.3)

## 2021-05-05 PROCEDURE — 82565 ASSAY OF CREATININE: CPT

## 2021-05-05 PROCEDURE — 25010000002 IOPAMIDOL 61 % SOLUTION: Performed by: INTERNAL MEDICINE

## 2021-05-05 PROCEDURE — 0 DIATRIZOATE MEGLUMINE & SODIUM PER 1 ML: Performed by: INTERNAL MEDICINE

## 2021-05-05 PROCEDURE — 74177 CT ABD & PELVIS W/CONTRAST: CPT

## 2021-05-05 RX ADMIN — DIATRIZOATE MEGLUMINE AND DIATRIZOATE SODIUM 30 ML: 660; 100 LIQUID ORAL; RECTAL at 15:15

## 2021-05-05 RX ADMIN — IOPAMIDOL 85 ML: 612 INJECTION, SOLUTION INTRAVENOUS at 16:44

## 2021-05-06 ENCOUNTER — HOSPITAL ENCOUNTER (EMERGENCY)
Facility: HOSPITAL | Age: 61
Discharge: HOME | End: 2021-05-06
Attending: EMERGENCY MEDICINE | Admitting: EMERGENCY MEDICINE

## 2021-05-06 VITALS
DIASTOLIC BLOOD PRESSURE: 90 MMHG | RESPIRATION RATE: 16 BRPM | SYSTOLIC BLOOD PRESSURE: 129 MMHG | TEMPERATURE: 98.6 F | HEART RATE: 83 BPM | OXYGEN SATURATION: 99 %

## 2021-05-06 DIAGNOSIS — R79.89 ELEVATED SERUM CREATININE: Primary | ICD-10-CM

## 2021-05-06 DIAGNOSIS — E83.52 HYPERCALCEMIA: ICD-10-CM

## 2021-05-06 LAB
ALBUMIN SERPL-MCNC: 4.4 G/DL (ref 3.5–5.2)
ALBUMIN/GLOB SERPL: 1.7 G/DL
ALP SERPL-CCNC: 70 U/L (ref 39–117)
ALT SERPL W P-5'-P-CCNC: 11 U/L (ref 1–33)
ANION GAP SERPL CALCULATED.3IONS-SCNC: 11.9 MMOL/L (ref 5–15)
AST SERPL-CCNC: 15 U/L (ref 1–32)
BACTERIA UR QL AUTO: ABNORMAL /HPF
BASOPHILS # BLD AUTO: 0.07 10*3/MM3 (ref 0–0.2)
BASOPHILS NFR BLD AUTO: 0.8 % (ref 0–1.5)
BILIRUB SERPL-MCNC: 0.4 MG/DL (ref 0–1.2)
BILIRUB UR QL STRIP: NEGATIVE
BUN SERPL-MCNC: 19 MG/DL (ref 6–20)
BUN/CREAT SERPL: 12.9 (ref 7–25)
CALCIUM SPEC-SCNC: 12 MG/DL (ref 8.6–10.5)
CHLORIDE SERPL-SCNC: 101 MMOL/L (ref 98–107)
CLARITY UR: ABNORMAL
CO2 SERPL-SCNC: 27.1 MMOL/L (ref 22–29)
COLOR UR: YELLOW
CREAT SERPL-MCNC: 1.47 MG/DL (ref 0.57–1)
DEPRECATED RDW RBC AUTO: 42.8 FL (ref 37–54)
EOSINOPHIL # BLD AUTO: 0.1 10*3/MM3 (ref 0–0.4)
EOSINOPHIL NFR BLD AUTO: 1.2 % (ref 0.3–6.2)
ERYTHROCYTE [DISTWIDTH] IN BLOOD BY AUTOMATED COUNT: 13.1 % (ref 12.3–15.4)
GFR SERPL CREATININE-BSD FRML MDRD: 36 ML/MIN/1.73
GLOBULIN UR ELPH-MCNC: 2.6 GM/DL
GLUCOSE SERPL-MCNC: 91 MG/DL (ref 65–99)
GLUCOSE UR STRIP-MCNC: NEGATIVE MG/DL
HCT VFR BLD AUTO: 40.7 % (ref 34–46.6)
HGB BLD-MCNC: 13.6 G/DL (ref 12–15.9)
HGB UR QL STRIP.AUTO: NEGATIVE
HOLD SPECIMEN: NORMAL
HOLD SPECIMEN: NORMAL
HYALINE CASTS UR QL AUTO: ABNORMAL /LPF
IMM GRANULOCYTES # BLD AUTO: 0.04 10*3/MM3 (ref 0–0.05)
IMM GRANULOCYTES NFR BLD AUTO: 0.5 % (ref 0–0.5)
KETONES UR QL STRIP: NEGATIVE
LEUKOCYTE ESTERASE UR QL STRIP.AUTO: ABNORMAL
LIPASE SERPL-CCNC: 35 U/L (ref 13–60)
LYMPHOCYTES # BLD AUTO: 2.42 10*3/MM3 (ref 0.7–3.1)
LYMPHOCYTES NFR BLD AUTO: 28.4 % (ref 19.6–45.3)
MCH RBC QN AUTO: 30.4 PG (ref 26.6–33)
MCHC RBC AUTO-ENTMCNC: 33.4 G/DL (ref 31.5–35.7)
MCV RBC AUTO: 90.8 FL (ref 79–97)
MONOCYTES # BLD AUTO: 0.7 10*3/MM3 (ref 0.1–0.9)
MONOCYTES NFR BLD AUTO: 8.2 % (ref 5–12)
NEUTROPHILS NFR BLD AUTO: 5.2 10*3/MM3 (ref 1.7–7)
NEUTROPHILS NFR BLD AUTO: 60.9 % (ref 42.7–76)
NITRITE UR QL STRIP: NEGATIVE
NRBC BLD AUTO-RTO: 0 /100 WBC (ref 0–0.2)
PH UR STRIP.AUTO: 8 [PH] (ref 5–8)
PLATELET # BLD AUTO: 302 10*3/MM3 (ref 140–450)
PMV BLD AUTO: 10.3 FL (ref 6–12)
POTASSIUM SERPL-SCNC: 3.8 MMOL/L (ref 3.5–5.2)
PROT SERPL-MCNC: 7 G/DL (ref 6–8.5)
PROT UR QL STRIP: NEGATIVE
RBC # BLD AUTO: 4.48 10*6/MM3 (ref 3.77–5.28)
RBC # UR: ABNORMAL /HPF
REF LAB TEST METHOD: ABNORMAL
SODIUM SERPL-SCNC: 140 MMOL/L (ref 136–145)
SP GR UR STRIP: 1.01 (ref 1–1.03)
SQUAMOUS #/AREA URNS HPF: ABNORMAL /HPF
UROBILINOGEN UR QL STRIP: ABNORMAL
WBC # BLD AUTO: 8.53 10*3/MM3 (ref 3.4–10.8)
WBC UR QL AUTO: ABNORMAL /HPF
WHOLE BLOOD HOLD SPECIMEN: NORMAL
WHOLE BLOOD HOLD SPECIMEN: NORMAL

## 2021-05-06 PROCEDURE — 83690 ASSAY OF LIPASE: CPT

## 2021-05-06 PROCEDURE — 99283 EMERGENCY DEPT VISIT LOW MDM: CPT

## 2021-05-06 PROCEDURE — 85025 COMPLETE CBC W/AUTO DIFF WBC: CPT

## 2021-05-06 PROCEDURE — 81001 URINALYSIS AUTO W/SCOPE: CPT

## 2021-05-06 PROCEDURE — 80053 COMPREHEN METABOLIC PANEL: CPT

## 2021-05-06 PROCEDURE — 36415 COLL VENOUS BLD VENIPUNCTURE: CPT

## 2021-05-06 RX ORDER — SODIUM CHLORIDE 0.9 % (FLUSH) 0.9 %
10 SYRINGE (ML) INJECTION AS NEEDED
Status: DISCONTINUED | OUTPATIENT
Start: 2021-05-06 | End: 2021-05-07 | Stop reason: HOSPADM

## 2021-05-06 RX ADMIN — SODIUM CHLORIDE 1000 ML: 9 INJECTION, SOLUTION INTRAVENOUS at 21:42

## 2021-06-14 RX ORDER — PANTOPRAZOLE SODIUM 40 MG/1
40 TABLET, DELAYED RELEASE ORAL EVERY MORNING
Qty: 90 TABLET | Refills: 3 | Status: SHIPPED | OUTPATIENT
Start: 2021-06-14 | End: 2022-06-06

## 2021-09-08 ENCOUNTER — OFFICE VISIT (OUTPATIENT)
Dept: GASTROENTEROLOGY | Facility: CLINIC | Age: 61
End: 2021-09-08

## 2021-09-08 VITALS — HEIGHT: 68 IN | TEMPERATURE: 97.3 F | BODY MASS INDEX: 22.13 KG/M2 | WEIGHT: 146 LBS

## 2021-09-08 DIAGNOSIS — R19.7 DIARRHEA, UNSPECIFIED TYPE: Primary | ICD-10-CM

## 2021-09-08 PROCEDURE — 99213 OFFICE O/P EST LOW 20 MIN: CPT | Performed by: NURSE PRACTITIONER

## 2021-09-08 NOTE — PROGRESS NOTES
Chief Complaint   Patient presents with   • Diarrhea       HPI    Keila Caldwell is a  61 y.o. female here for a follow up visit for diarrhea.    This patient follows with Dr. Bowers and myself.    History of thyroidectomy 10 years ago, hypocalcemia, GERD, migraines, and urinary tract infections.    Prior work-up for complaints of abdominal pain has included a HIDA scan and a gallbladder ultrasound which were normal.      Subsequently underwent an EGD with Dr. Bowers on 4/14/2021 which I reviewed as follows: EGD with variable Z-line otherwise normal.  Pathology was normal.    Patient then went for CT of the abdomen and pelvis which showed benign hepatic cyst, probable left kidney cyst, nonobstructing left kidney stone but nothing to explain GI symptoms.  Patient wants to follow-up with her PCP or urologist for non-GI findings.    On visit today she tells me that prior abdominal pain and nausea was secondary to elevated calcium and kidney stone.    Her primary complaint of visit today is diarrhea onset approximately 2 months ago postprandial in nature up to 8 liquid episodes a day.  No abdominal pain, rectal pain, or rectal bleeding.  Denies sick contacts or recent antibiotic use.  Recent TSH per patient was normal.  Recent LFTs normal.  Recent hemogram normal.  She has not tried anything for symptoms.  Some weight loss over the last 6 months likely secondary to prior nausea but weight has been stable as of late.    Past Medical History:   Diagnosis Date   • Arthritis    • Chronic nausea    • GERD (gastroesophageal reflux disease)    • History of UTI    • Hypothyroidism    • Migraines     ON MED   • PONV (postoperative nausea and vomiting)        Past Surgical History:   Procedure Laterality Date   • COLONOSCOPY  approx 2008    normal per pt    • COLONOSCOPY W/ POLYPECTOMY N/A 3/7/2019    One 3 mm polyp in the transverse colon. Path:Mild chronic inflammation.    • ENDOSCOPY N/A 3/7/2019    LA Grade A esophagitis    • ENDOSCOPY N/A 4/14/2021    Procedure: ESOPHAGOGASTRODUODENOSCOPY WITH BX;  Surgeon: Aravind Bowers MD;  Location: [facility] END[facility]COPY;  Service: Gastroenterology;  Laterality: N/A;  RUQ PAIN   --IRREGULAR Z-LINE    • FOOT SURGERY Right     CYST   • HYSTERECTOMY     • TONSILLECTOMY     • TOTAL HIP ARTHROPLASTY Left 3/16/2018    Procedure: LT TOTAL HIP ARTHROPLASTY ANTERIOR WITH HANA TABLE;  Surgeon: Sergio Balderas MD;  Location: [facility] MAIN OR;  Service: Orthopedics       Scheduled Meds:     Continuous Infusions: No current facility-administered medications for this visit.      PRN Meds:     Allergies   Allergen Reactions   • Nsaids Other (See Comments)     KIDNEY DYSFUNCTION       Social History     Socioeconomic History   • Marital status:      Spouse name: Not on file   • Number of children: Not on file   • Years of education: Not on file   • Highest education level: Not on file   Tobacco Use   • Smoking status: Never Smoker   • Smokeless tobacco: Never Used   Vaping Use   • Vaping Use: Never used   Substance and Sexual Activity   • Alcohol use: Yes     Alcohol/week: 1.0 standard drinks     Types: 1 Glasses of wine per week     Comment: once a month or so   • Drug use: No   • Sexual activity: Defer       Family History   Problem Relation Age of Onset   • No Known Problems Mother    • Stomach cancer Father    • Colon polyps Father    • No Known Problems Sister    • No Known Problems Brother    • No Known Problems Son    • No Known Problems Daughter    • No Known Problems Maternal Grandmother    • No Known Problems Maternal Grandfather    • No Known Problems Paternal Grandmother    • No Known Problems Paternal Grandfather    • No Known Problems Cousin    • Malig Hyperthermia Neg Hx        Review of Systems   Constitutional: Negative for activity change, appetite change, fatigue, fever and unexpected weight change.   HENT: Negative for trouble swallowing.    Respiratory: Negative for apnea,  cough, choking, chest tightness, shortness of breath and wheezing.    Cardiovascular: Negative for chest pain, palpitations and leg swelling.   Gastrointestinal: Positive for diarrhea. Negative for abdominal distention, abdominal pain, anal bleeding, blood in stool, constipation, nausea, rectal pain and vomiting.       Vitals:    09/08/21 0904   Temp: 97.3 °F (36.3 °C)       Physical Exam  Constitutional:       Appearance: She is well-developed.   Abdominal:      General: Bowel sounds are normal. There is no distension.      Palpations: Abdomen is soft. There is no mass.      Tenderness: There is no abdominal tenderness. There is no guarding.      Hernia: No hernia is present.   Skin:     General: Skin is warm and dry.      Capillary Refill: Capillary refill takes less than 2 seconds.   Neurological:      Mental Status: She is alert and oriented to person, place, and time.   Psychiatric:         Behavior: Behavior normal.       Assessment    1. Diarrhea, unspecified type    - Gastrointestinal Panel, PCR - Stool, Per Rectum  - Clostridium Difficile Toxin, PCR - Stool, Per Rectum  - Calprotectin, Fecal - Stool, Per Rectum    Plan    Arrange stool testing to rule out infectious pathogens, assess for colonic inflammation, rule out pancreatic insufficiency.  Start Cruz colon health probiotics.  Consider lactose-free diet.  Depending on stool testing results we may need to consider moving to colonoscopy.           YARELIS Bunch  [facility]  [address] [place] [address]  Office: [phone]

## 2021-09-23 LAB — CALPROTECTIN STL-MCNT: 118 UG/G (ref 0–120)

## 2021-09-24 LAB — C DIFF TOX GENS STL QL NAA+PROBE: NORMAL

## 2021-09-26 LAB

## 2021-09-27 ENCOUNTER — TELEPHONE (OUTPATIENT)
Dept: GASTROENTEROLOGY | Facility: CLINIC | Age: 61
End: 2021-09-27

## 2021-09-27 RX ORDER — CIPROFLOXACIN 500 MG/1
500 TABLET, FILM COATED ORAL 2 TIMES DAILY
Qty: 10 TABLET | Refills: 0 | Status: SHIPPED | OUTPATIENT
Start: 2021-09-27 | End: 2021-10-02

## 2021-09-27 NOTE — TELEPHONE ENCOUNTER
Per DEV Chan:  Can you call Keila this morning and let her know her stool test shows E. coli. I sent over antibiotics. Recommendations are in epic.

## 2021-09-27 NOTE — PROGRESS NOTES
Please inform Keila that her stool testing shows E. coli pathogen commonly seen in traveler's diarrhea.  Recommend 5 days of Cipro which I have sent to her pharmacy.  Hold calcium and magnesium while taking antibiotics.

## 2021-09-28 LAB
ELASTASE PANC STL-MCNT: 360 UG ELAST./G
SPECIMEN STATUS: NORMAL

## 2021-11-23 ENCOUNTER — APPOINTMENT (OUTPATIENT)
Dept: WOMENS IMAGING | Facility: HOSPITAL | Age: 61
End: 2021-11-23

## 2021-11-23 PROCEDURE — 77067 SCR MAMMO BI INCL CAD: CPT | Performed by: RADIOLOGY

## 2021-11-23 PROCEDURE — 77063 BREAST TOMOSYNTHESIS BI: CPT | Performed by: RADIOLOGY

## 2022-01-06 PROCEDURE — U0004 COV-19 TEST NON-CDC HGH THRU: HCPCS | Performed by: NURSE PRACTITIONER

## 2022-02-01 ENCOUNTER — OFFICE VISIT (OUTPATIENT)
Dept: FAMILY MEDICINE CLINIC | Facility: CLINIC | Age: 62
End: 2022-02-01

## 2022-02-01 VITALS
SYSTOLIC BLOOD PRESSURE: 124 MMHG | WEIGHT: 151 LBS | HEART RATE: 74 BPM | TEMPERATURE: 97.7 F | HEIGHT: 68 IN | OXYGEN SATURATION: 97 % | DIASTOLIC BLOOD PRESSURE: 84 MMHG | RESPIRATION RATE: 18 BRPM | BODY MASS INDEX: 22.88 KG/M2

## 2022-02-01 DIAGNOSIS — N30.01 ACUTE CYSTITIS WITH HEMATURIA: Primary | ICD-10-CM

## 2022-02-01 DIAGNOSIS — R30.0 DYSURIA: ICD-10-CM

## 2022-02-01 LAB
BILIRUB BLD-MCNC: NEGATIVE MG/DL
CLARITY, POC: ABNORMAL
COLOR UR: YELLOW
EXPIRATION DATE: ABNORMAL
GLUCOSE UR STRIP-MCNC: NEGATIVE MG/DL
KETONES UR QL: NEGATIVE
LEUKOCYTE EST, POC: NEGATIVE
Lab: ABNORMAL
NITRITE UR-MCNC: NEGATIVE MG/ML
PH UR: 7 [PH] (ref 5–8)
PROT UR STRIP-MCNC: NEGATIVE MG/DL
RBC # UR STRIP: ABNORMAL /UL
SP GR UR: 1.02 (ref 1–1.03)
UROBILINOGEN UR QL: NORMAL

## 2022-02-01 PROCEDURE — 99213 OFFICE O/P EST LOW 20 MIN: CPT | Performed by: STUDENT IN AN ORGANIZED HEALTH CARE EDUCATION/TRAINING PROGRAM

## 2022-02-01 PROCEDURE — 81003 URINALYSIS AUTO W/O SCOPE: CPT | Performed by: STUDENT IN AN ORGANIZED HEALTH CARE EDUCATION/TRAINING PROGRAM

## 2022-02-01 RX ORDER — SULFAMETHOXAZOLE AND TRIMETHOPRIM 800; 160 MG/1; MG/1
1 TABLET ORAL 2 TIMES DAILY
Qty: 14 TABLET | Refills: 0 | OUTPATIENT
Start: 2022-02-01 | End: 2022-02-23

## 2022-02-02 NOTE — PROGRESS NOTES
"Chief Complaint  Dysuria (POSSIBLE UTI BURNING FOR 3WKS)    Subjective          Keila Caldwell presents to [facility]  Patient complaining of lower abdominal pain and increased frequency for almost 3 weeks.  Patient reported she tried drinking a lot of water but does not seem like her infection is clearing up.  Urine dipstick came out positive for blood trace but negative for nitrite.  Denies having any burning urination  Review of system is negative for fever, headache, chest pain, shortness of breath, palpitation, nausea, vomiting, any recent change in bowel habits.        Objective   Vital Signs:   /84 (BP Location: Left arm, Patient Position: Sitting)   Pulse 74   Temp 97.7 °F (36.5 °C) (Oral)   Resp 18   Ht 172.7 cm (67.99\")   Wt 68.5 kg (151 lb)   SpO2 97%   BMI 22.96 kg/m²     Physical Exam  HENT:      Head: Normocephalic and atraumatic.      Mouth/Throat:      Mouth: Mucous membranes are moist.      Pharynx: Oropharynx is clear.   Eyes:      Extraocular Movements: Extraocular movements intact.      Conjunctiva/sclera: Conjunctivae normal.      Pupils: Pupils are equal, round, and reactive to light.   Cardiovascular:      Rate and Rhythm: Normal rate and regular rhythm.   Pulmonary:      Effort: Pulmonary effort is normal.      Breath sounds: Normal breath sounds.   Abdominal:      General: Bowel sounds are normal. There is no distension.      Palpations: Abdomen is soft.      Tenderness: There is no abdominal tenderness. There is no guarding.   Musculoskeletal:         General: Normal range of motion.      Cervical back: Neck supple.   Skin:     General: Skin is warm.      Capillary Refill: Capillary refill takes less than 2 seconds.   Neurological:      General: No focal deficit present.      Mental Status: She is alert and oriented to person, place, and time. Mental status is at baseline.   Psychiatric:         Mood and Affect: Mood normal.        Result Review " :                 Assessment and Plan    Diagnoses and all orders for this visit:    1. Acute cystitis with hematuria (Primary)  Comments:  Prescribed Bactrim for 7 days, RTC or ER if symptoms worsen or persist  Orders:  -     sulfamethoxazole-trimethoprim (Bactrim DS) 800-160 MG per tablet; Take 1 tablet by mouth 2 (Two) Times a Day.  Dispense: 14 tablet; Refill: 0    2. Dysuria  -     POCT urinalysis dipstick, automated        Follow Up   Return if symptoms worsen or fail to improve.  Patient was given instructions and counseling regarding her condition or for health maintenance advice. Please see specific information pulled into the AVS if appropriate.

## 2022-04-16 ENCOUNTER — HOSPITAL ENCOUNTER (EMERGENCY)
Facility: HOSPITAL | Age: 62
Discharge: HOME | End: 2022-04-16
Attending: EMERGENCY MEDICINE | Admitting: EMERGENCY MEDICINE

## 2022-04-16 ENCOUNTER — APPOINTMENT (OUTPATIENT)
Dept: GENERAL RADIOLOGY | Facility: HOSPITAL | Age: 62
End: 2022-04-16

## 2022-04-16 VITALS
RESPIRATION RATE: 16 BRPM | HEART RATE: 76 BPM | SYSTOLIC BLOOD PRESSURE: 116 MMHG | BODY MASS INDEX: 23.62 KG/M2 | HEIGHT: 68 IN | TEMPERATURE: 97.4 F | DIASTOLIC BLOOD PRESSURE: 81 MMHG | OXYGEN SATURATION: 97 % | WEIGHT: 155.87 LBS

## 2022-04-16 DIAGNOSIS — R07.9 CHEST PAIN, UNSPECIFIED TYPE: Primary | ICD-10-CM

## 2022-04-16 LAB
ANION GAP SERPL CALCULATED.3IONS-SCNC: 11 MMOL/L (ref 5–15)
APTT PPP: 25.9 SECONDS (ref 61–76.5)
BASOPHILS # BLD AUTO: 0.1 10*3/MM3 (ref 0–0.2)
BASOPHILS NFR BLD AUTO: 1.9 % (ref 0–1.5)
BUN SERPL-MCNC: 16 MG/DL (ref 8–23)
BUN/CREAT SERPL: 16.2 (ref 7–25)
CALCIUM SPEC-SCNC: 7.8 MG/DL (ref 8.6–10.5)
CHLORIDE SERPL-SCNC: 102 MMOL/L (ref 98–107)
CO2 SERPL-SCNC: 26 MMOL/L (ref 22–29)
CREAT SERPL-MCNC: 0.99 MG/DL (ref 0.57–1)
DEPRECATED RDW RBC AUTO: 43.8 FL (ref 37–54)
EGFRCR SERPLBLD CKD-EPI 2021: 65.4 ML/MIN/1.73
EOSINOPHIL # BLD AUTO: 0.1 10*3/MM3 (ref 0–0.4)
EOSINOPHIL NFR BLD AUTO: 1.7 % (ref 0.3–6.2)
ERYTHROCYTE [DISTWIDTH] IN BLOOD BY AUTOMATED COUNT: 14.3 % (ref 12.3–15.4)
GLUCOSE SERPL-MCNC: 106 MG/DL (ref 65–99)
HCT VFR BLD AUTO: 37.3 % (ref 34–46.6)
HGB BLD-MCNC: 13.3 G/DL (ref 12–15.9)
INR PPP: 1 (ref 0.93–1.1)
LYMPHOCYTES # BLD AUTO: 2.6 10*3/MM3 (ref 0.7–3.1)
LYMPHOCYTES NFR BLD AUTO: 37.7 % (ref 19.6–45.3)
MCH RBC QN AUTO: 31.2 PG (ref 26.6–33)
MCHC RBC AUTO-ENTMCNC: 35.8 G/DL (ref 31.5–35.7)
MCV RBC AUTO: 87.2 FL (ref 79–97)
MONOCYTES # BLD AUTO: 0.6 10*3/MM3 (ref 0.1–0.9)
MONOCYTES NFR BLD AUTO: 8.7 % (ref 5–12)
NEUTROPHILS NFR BLD AUTO: 3.4 10*3/MM3 (ref 1.7–7)
NEUTROPHILS NFR BLD AUTO: 50 % (ref 42.7–76)
NRBC BLD AUTO-RTO: 0.1 /100 WBC (ref 0–0.2)
PLATELET # BLD AUTO: 296 10*3/MM3 (ref 140–450)
PMV BLD AUTO: 8.3 FL (ref 6–12)
POTASSIUM SERPL-SCNC: 3.6 MMOL/L (ref 3.5–5.2)
PROTHROMBIN TIME: 10.3 SECONDS (ref 9.6–11.7)
RBC # BLD AUTO: 4.27 10*6/MM3 (ref 3.77–5.28)
SODIUM SERPL-SCNC: 139 MMOL/L (ref 136–145)
TROPONIN T SERPL-MCNC: <0.01 NG/ML (ref 0–0.03)
WBC NRBC COR # BLD: 6.9 10*3/MM3 (ref 3.4–10.8)

## 2022-04-16 PROCEDURE — 85730 THROMBOPLASTIN TIME PARTIAL: CPT | Performed by: EMERGENCY MEDICINE

## 2022-04-16 PROCEDURE — 85025 COMPLETE CBC W/AUTO DIFF WBC: CPT | Performed by: EMERGENCY MEDICINE

## 2022-04-16 PROCEDURE — 93005 ELECTROCARDIOGRAM TRACING: CPT | Performed by: EMERGENCY MEDICINE

## 2022-04-16 PROCEDURE — 85610 PROTHROMBIN TIME: CPT | Performed by: EMERGENCY MEDICINE

## 2022-04-16 PROCEDURE — 80048 BASIC METABOLIC PNL TOTAL CA: CPT | Performed by: EMERGENCY MEDICINE

## 2022-04-16 PROCEDURE — 71045 X-RAY EXAM CHEST 1 VIEW: CPT

## 2022-04-16 PROCEDURE — 84484 ASSAY OF TROPONIN QUANT: CPT | Performed by: EMERGENCY MEDICINE

## 2022-04-16 PROCEDURE — 36415 COLL VENOUS BLD VENIPUNCTURE: CPT

## 2022-04-16 PROCEDURE — 93005 ELECTROCARDIOGRAM TRACING: CPT

## 2022-04-16 PROCEDURE — 99283 EMERGENCY DEPT VISIT LOW MDM: CPT

## 2022-04-16 RX ORDER — SODIUM CHLORIDE 0.9 % (FLUSH) 0.9 %
10 SYRINGE (ML) INJECTION AS NEEDED
Status: DISCONTINUED | OUTPATIENT
Start: 2022-04-16 | End: 2022-04-17 | Stop reason: HOSPADM

## 2022-04-17 NOTE — ED PROVIDER NOTES
Subjective   Chief complaint: Chest pain    62-year-old female presents with chest pain.  Patient states she has had a mild discomfort in the right parasternal area intermittently over the past couple weeks.  She states it is very brief and she describes it as a fluttering sensation.  This evening she had an episode of chest discomfort and also had some mild shortness of breath and lightheadedness.  She states she felt clammy.  She states she is feeling better now.  She states this episode lasted about an hour.  She denies any alleviating or exacerbating factors.  She denies any radiation of the pain.  She states she had a stress test couple years ago that was normal.      History provided by:  Patient      Review of Systems   Constitutional: Negative for fever.   HENT: Negative for congestion and sore throat.    Eyes: Negative for redness.   Respiratory: Positive for shortness of breath. Negative for cough.    Cardiovascular: Positive for chest pain.   Gastrointestinal: Negative for abdominal pain, diarrhea and vomiting.   Genitourinary: Negative for dysuria.   Musculoskeletal: Negative for back pain.   Skin: Negative for rash.   Neurological: Positive for light-headedness. Negative for headaches.   Psychiatric/Behavioral: Negative for confusion.       Past Medical History:   Diagnosis Date   • Arthritis    • Chronic nausea    • GERD (gastroesophageal reflux disease)    • History of UTI    • Hypothyroidism    • Migraines     ON MED   • PONV (postoperative nausea and vomiting)        Allergies   Allergen Reactions   • Nsaids Other (See Comments)     KIDNEY DYSFUNCTION       Past Surgical History:   Procedure Laterality Date   • COLONOSCOPY  approx 2008    normal per pt    • COLONOSCOPY W/ POLYPECTOMY N/A 3/7/2019    One 3 mm polyp in the transverse colon. Path:Mild chronic inflammation.    • ENDOSCOPY N/A 3/7/2019    LA Grade A esophagitis   • ENDOSCOPY N/A 4/14/2021    Procedure: ESOPHAGOGASTRODUODENOSCOPY WITH BX;  " Surgeon: Aravind Bowers MD;  Location: [facility] ENDOSCOPY;  Service: Gastroenterology;  Laterality: N/A;  RUQ PAIN   --IRREGULAR Z-LINE    • FOOT SURGERY Right     CYST   • HYSTERECTOMY     • TONSILLECTOMY     • TOTAL HIP ARTHROPLASTY Left 3/16/2018    Procedure: LT TOTAL HIP ARTHROPLASTY ANTERIOR WITH HANA TABLE;  Surgeon: Sergio Balderas MD;  Location: [facility] MAIN OR;  Service: Orthopedics       Family History   Problem Relation Age of Onset   • No Known Problems Mother    • Stomach cancer Father    • Colon polyps Father    • No Known Problems Sister    • No Known Problems Brother    • No Known Problems Son    • No Known Problems Daughter    • No Known Problems Maternal Grandmother    • No Known Problems Maternal Grandfather    • No Known Problems Paternal Grandmother    • No Known Problems Paternal Grandfather    • No Known Problems Cousin    • Malig Hyperthermia Neg Hx        Social History     Socioeconomic History   • Marital status:    Tobacco Use   • Smoking status: Never Smoker   • Smokeless tobacco: Never Used   Vaping Use   • Vaping Use: Never used   Substance and Sexual Activity   • Alcohol use: Yes     Alcohol/week: 1.0 standard drink     Types: 1 Glasses of wine per week     Comment: once a month or so   • Drug use: No   • Sexual activity: Defer       /69   Pulse 66   Temp 97 °F (36.1 °C) (Temporal)   Resp 16   Ht 172.7 cm (68\")   Wt 70.7 kg (155 lb 13.8 oz)   SpO2 100%   BMI 23.70 kg/m²       Objective   Physical Exam  Vitals and nursing note reviewed.   Constitutional:       Appearance: She is well-developed.   HENT:      Head: Normocephalic and atraumatic.   Eyes:      Pupils: Pupils are equal, round, and reactive to light.   Cardiovascular:      Rate and Rhythm: Normal rate and regular rhythm.      Heart sounds: Normal heart sounds.   Pulmonary:      Effort: Pulmonary effort is normal. No respiratory distress.      Breath sounds: Normal breath sounds.   Abdominal: "      General: Bowel sounds are normal.      Palpations: Abdomen is soft.      Tenderness: There is no abdominal tenderness.   Musculoskeletal:         General: Normal range of motion.      Cervical back: Normal range of motion and neck supple.   Skin:     General: Skin is warm and dry.   Neurological:      General: No focal deficit present.      Mental Status: She is alert and oriented to person, place, and time.         Procedures           ED Course      My interpretation of EKG shows sinus rhythm, rate of 74, no ST elevation     Results for orders placed or performed during the hospital encounter of 04/16/22   Basic Metabolic Panel    Specimen: Arm, Left; Blood   Result Value Ref Range    Glucose 106 (H) 65 - 99 mg/dL    BUN 16 8 - 23 mg/dL    Creatinine 0.99 0.57 - 1.00 mg/dL    Sodium 139 136 - 145 mmol/L    Potassium 3.6 3.5 - 5.2 mmol/L    Chloride 102 98 - 107 mmol/L    CO2 26.0 22.0 - 29.0 mmol/L    Calcium 7.8 (L) 8.6 - 10.5 mg/dL    BUN/Creatinine Ratio 16.2 7.0 - 25.0    Anion Gap 11.0 5.0 - 15.0 mmol/L    eGFR 65.4 >60.0 mL/min/1.73   Protime-INR    Specimen: Blood   Result Value Ref Range    Protime 10.3 9.6 - 11.7 Seconds    INR 1.00 0.93 - 1.10   aPTT    Specimen: Blood   Result Value Ref Range    PTT 25.9 (L) 61.0 - 76.5 seconds   Troponin    Specimen: Arm, Left; Blood   Result Value Ref Range    Troponin T <0.010 0.000 - 0.030 ng/mL   CBC Auto Differential    Specimen: Blood   Result Value Ref Range    WBC 6.90 3.40 - 10.80 10*3/mm3    RBC 4.27 3.77 - 5.28 10*6/mm3    Hemoglobin 13.3 12.0 - 15.9 g/dL    Hematocrit 37.3 34.0 - 46.6 %    MCV 87.2 79.0 - 97.0 fL    MCH 31.2 26.6 - 33.0 pg    MCHC 35.8 (H) 31.5 - 35.7 g/dL    RDW 14.3 12.3 - 15.4 %    RDW-SD 43.8 37.0 - 54.0 fl    MPV 8.3 6.0 - 12.0 fL    Platelets 296 140 - 450 10*3/mm3    Neutrophil % 50.0 42.7 - 76.0 %    Lymphocyte % 37.7 19.6 - 45.3 %    Monocyte % 8.7 5.0 - 12.0 %    Eosinophil % 1.7 0.3 - 6.2 %    Basophil % 1.9 (H) 0.0 - 1.5 %     Neutrophils, Absolute 3.40 1.70 - 7.00 10*3/mm3    Lymphocytes, Absolute 2.60 0.70 - 3.10 10*3/mm3    Monocytes, Absolute 0.60 0.10 - 0.90 10*3/mm3    Eosinophils, Absolute 0.10 0.00 - 0.40 10*3/mm3    Basophils, Absolute 0.10 0.00 - 0.20 10*3/mm3    nRBC 0.1 0.0 - 0.2 /100 WBC   ECG 12 Lead   Result Value Ref Range    QT Interval 413 ms     Chest x-ray reviewed by me shows no acute disease, pending radiology review.                                      MDM   Patient had the above evaluation.  Results were discussed with the patient.  Patient remained well-appearing in the emergency room.  Her work-up has been unremarkable.  Chest x-ray shows no acute disease.  EKG shows no acute ischemia.  Troponin is negative.  We discussed admission versus discharge.  Patient states she would like to be discharged to follow-up with her primary doctor.  She was instructed to return for any new or worsening symptoms.  Heart score is low risk.      Final diagnoses:   Chest pain, unspecified type       ED Disposition  ED Disposition     ED Disposition   Discharge    Condition   Stable    Comment   --             Nito Cervantes MD  [address] [facility] [address] [phone]    Call in 2 days           Medication List      No changes were made to your prescriptions during this visit.          Dwain Sanchez MD  04/16/22 [id number]

## 2022-04-17 NOTE — ED NOTES
Pt presents to ED with c/o midsternal chest pain that has been occurring intermittently over the last two weeks. Pt reports feeling dizziness today while having the CP with mild nausea. Pt states she had a cardiac workup two years ago with PCP with no significant findings. Pt denies any previous cardiac hx

## 2022-04-19 LAB — QT INTERVAL: 413 MS

## 2022-04-21 ENCOUNTER — OFFICE VISIT (OUTPATIENT)
Dept: FAMILY MEDICINE CLINIC | Facility: CLINIC | Age: 62
End: 2022-04-21

## 2022-04-21 VITALS
RESPIRATION RATE: 18 BRPM | DIASTOLIC BLOOD PRESSURE: 77 MMHG | OXYGEN SATURATION: 100 % | SYSTOLIC BLOOD PRESSURE: 123 MMHG | HEIGHT: 68 IN | WEIGHT: 155.6 LBS | HEART RATE: 79 BPM | BODY MASS INDEX: 23.58 KG/M2 | TEMPERATURE: 96.3 F

## 2022-04-21 DIAGNOSIS — R07.89 OTHER CHEST PAIN: ICD-10-CM

## 2022-04-21 DIAGNOSIS — E78.01 FAMILIAL HYPERCHOLESTEROLEMIA: Primary | ICD-10-CM

## 2022-04-21 PROBLEM — E78.5 HYPERLIPEMIA: Status: ACTIVE | Noted: 2022-04-21

## 2022-04-21 PROCEDURE — 99214 OFFICE O/P EST MOD 30 MIN: CPT | Performed by: INTERNAL MEDICINE

## 2022-04-21 RX ORDER — NEOMYCIN/POLYMYXIN B/PRAMOXINE 3.5-10K-1
CREAM (GRAM) TOPICAL
COMMUNITY

## 2022-04-21 RX ORDER — ONABOTULINUMTOXINA 100 [USP'U]/1
INJECTION, POWDER, LYOPHILIZED, FOR SOLUTION INTRADERMAL; INTRAMUSCULAR ONCE
COMMUNITY

## 2022-04-21 RX ORDER — AMPICILLIN TRIHYDRATE 250 MG
CAPSULE ORAL
COMMUNITY
End: 2022-04-21

## 2022-04-21 RX ORDER — ATORVASTATIN CALCIUM 10 MG/1
10 TABLET, FILM COATED ORAL DAILY
Qty: 90 TABLET | Refills: 3 | Status: SHIPPED | OUTPATIENT
Start: 2022-04-21 | End: 2023-02-17

## 2022-04-21 NOTE — PROGRESS NOTES
Subjective   Keila Caldwell is a 62 y.o. female. Patient is here today for   Chief Complaint   Patient presents with   • Chest Pain     Intermittent for a few wks, also her calcium is low           Vitals:    04/21/22 0929   BP: 123/77   Pulse: 79   Resp: 18   Temp: 96.3 °F (35.7 °C)   SpO2: 100%     Body mass index is 23.66 kg/m².    The following portions of the patient's history were reviewed and updated as appropriate: allergies, current medications, past family history, past medical history, past social history, past surgical history and problem list.    Past Medical History:   Diagnosis Date   • Arthritis    • Chronic nausea    • GERD (gastroesophageal reflux disease)    • History of UTI    • Hypothyroidism    • Migraines     ON MED   • PONV (postoperative nausea and vomiting)       Allergies   Allergen Reactions   • Nsaids Other (See Comments)     KIDNEY DYSFUNCTION      Social History     Socioeconomic History   • Marital status:    Tobacco Use   • Smoking status: Never Smoker   • Smokeless tobacco: Never Used   Vaping Use   • Vaping Use: Never used   Substance and Sexual Activity   • Alcohol use: Yes     Alcohol/week: 1.0 standard drink     Types: 1 Glasses of wine per week     Comment: once a month or so   • Drug use: No   • Sexual activity: Defer        Current Outpatient Medications:   •  acetaminophen (TYLENOL) 500 MG tablet, Take 500 mg by mouth Every 6 (Six) Hours As Needed for Mild Pain . Takes 605mg daily, Disp: , Rfl:   •  calcitriol (ROCALTROL) 0.5 MCG capsule, Take 0.5 mcg by mouth 2 (Two) Times a Day., Disp: , Rfl:   •  CALCIUM PO, Take 1,000 mg by mouth 4 (Four) Times a Day. CHEWABLE TABLETS, Disp: , Rfl:   •  CBD (cannabidiol) oral oil, Take  by mouth. 3000 mg at night, Disp: , Rfl:   •  Erenumab-aooe (Aimovig) 140 MG/ML prefilled syringe, INJECT 140 MG INTO THE SKIN EVERY 30 (THIRTY) DAYS., Disp: , Rfl:   •  ergocalciferol (ERGOCALCIFEROL) 1.25 MG (14244 UT) capsule, Take 1 capsule by  mouth Every 30 (Thirty) Days., Disp: , Rfl:   •  estradiol (ESTRACE) 0.1 MG/GM vaginal cream, Insert 1 applicator into the vagina 2 (Two) Times a Week., Disp: , Rfl: 6  •  levothyroxine (SYNTHROID, LEVOTHROID) 88 MCG tablet, Take 88 mcg by mouth Daily., Disp: , Rfl:   •  liothyronine (CYTOMEL) 5 MCG tablet, Take 5 mcg by mouth Daily., Disp: , Rfl:   •  Magnesium 500 MG capsule, Take 1 capsule by mouth Daily., Disp: , Rfl:   •  Omega-3 Fatty Acids (Fish Oil Pearls) 300 MG capsule, Take  by mouth. 4 tablets daily, Disp: , Rfl:   •  onabotulinumtoxina (Botox) 100 units reconstituted solution injection, 1 (One) Time., Disp: , Rfl:   •  pantoprazole (PROTONIX) 40 MG EC tablet, Take 1 tablet by mouth Daily., Disp: , Rfl:   •  Red Yeast Rice 600 MG capsule, Take  by mouth., Disp: , Rfl:   •  rizatriptan (MAXALT) 10 MG tablet, Take 10 mg by mouth As Needed. TAKE 1 TABLET BY MOUTH ONCE AS NEEDED FOR MIGRAINE FOR UP TO 1 DOSE MAY REPEAT IN 2 HOURS IF NEEDED, Disp: , Rfl: 5  •  acetaminophen-codeine (TYLENOL #3) 300-30 MG per tablet, Take 1 tablet by mouth Every 6 (Six) Hours As Needed for Moderate Pain ., Disp: 12 tablet, Rfl: 0  •  Aimovig 140 MG/ML prefilled syringe, INJECT 140 MG INTO THE SKIN EVERY 30 (THIRTY) DAYS., Disp: , Rfl:   •  ciprofloxacin (CIPRO) 500 MG tablet, Take 1 tablet by mouth 2 (Two) Times a Day., Disp: 14 tablet, Rfl: 0  •  guaiFENesin 200 MG tablet, Take 2 tablets by mouth Every 6 (Six) Hours As Needed for Cough., Disp: 40 tablet, Rfl: 0  •  pantoprazole (PROTONIX) 40 MG EC tablet, Take 1 tablet by mouth Every Morning., Disp: 90 tablet, Rfl: 3  •  promethazine-dextromethorphan (PROMETHAZINE-DM) 6.25-15 MG/5ML syrup, Take 5 mL by mouth At Night As Needed for Cough., Disp: 90 mL, Rfl: 0  •  vitamin D (ERGOCALCIFEROL) 84627 units capsule capsule, Take 50,000 Units by mouth Every 30 (Thirty) Days., Disp: , Rfl: 1     Objective     History of Present Illness Keila is here for an emergency room follow-up.   She was evaluated at [facility] last week with complaints of chest pain.  She has had intermittent episodes over the last few months and the episode last week lasted about an hour.  Cardiac work-up in ER was unremarkable.  She does walk for exercise and has no chest pain.  She describes intermittent fluttering in her chest as well.  She does have familial hyperlipidemia.  Her cholesterol was checked recently by her endocrinologist and her LDL cholesterol was high.  HDL cholesterol is normal.  She is on pantoprazole for reflux.    Review of Systems   Constitutional: Negative.    Respiratory: Negative for shortness of breath.    Cardiovascular: Positive for chest pain.   Gastrointestinal: Negative.    Neurological: Negative.    Psychiatric/Behavioral: Negative.        Physical Exam  Vitals reviewed.   Constitutional:       Appearance: Normal appearance.   Cardiovascular:      Rate and Rhythm: Normal rate and regular rhythm.      Heart sounds: Normal heart sounds.      Comments: 115/60  Pulmonary:      Effort: Pulmonary effort is normal.      Breath sounds: Normal breath sounds.   Neurological:      Mental Status: She is alert.   Psychiatric:         Mood and Affect: Mood normal.         Behavior: Behavior normal.         Thought Content: Thought content normal.         Judgment: Judgment normal.         ASSESSMENT reviewed and discussed emergency room evaluation.  Will refer to cardiology for appropriate evaluation.  We will start atorvastatin 10 mg daily daily.  Your endocrinologist can recheck your lipids in a few months.     Problems Addressed this Visit        Cardiac and Vasculature    Other chest pain    Hyperlipemia - Primary      Diagnoses       Codes Comments    Familial hypercholesterolemia    -  Primary ICD-10-CM: E78.01  ICD-9-CM: 272.0     Other chest pain     ICD-10-CM: R07.89  ICD-9-CM: 786.59           PLAN  There are no Patient Instructions on file for this visit.  No follow-ups on  file.

## 2022-04-22 ENCOUNTER — TRANSCRIBE ORDERS (OUTPATIENT)
Dept: ADMINISTRATIVE | Facility: HOSPITAL | Age: 62
End: 2022-04-22

## 2022-04-22 DIAGNOSIS — N28.1 ACQUIRED CYST OF KIDNEY: Primary | ICD-10-CM

## 2022-04-29 ENCOUNTER — HOSPITAL ENCOUNTER (OUTPATIENT)
Dept: ULTRASOUND IMAGING | Facility: HOSPITAL | Age: 62
Discharge: HOME | End: 2022-04-29
Admitting: INTERNAL MEDICINE

## 2022-04-29 DIAGNOSIS — N28.1 ACQUIRED CYST OF KIDNEY: ICD-10-CM

## 2022-04-29 PROCEDURE — 76775 US EXAM ABDO BACK WALL LIM: CPT

## 2022-05-16 ENCOUNTER — OFFICE VISIT (OUTPATIENT)
Dept: CARDIOLOGY | Facility: CLINIC | Age: 62
End: 2022-05-16

## 2022-05-16 VITALS
BODY MASS INDEX: 24.8 KG/M2 | SYSTOLIC BLOOD PRESSURE: 121 MMHG | HEIGHT: 67 IN | DIASTOLIC BLOOD PRESSURE: 78 MMHG | WEIGHT: 158 LBS | OXYGEN SATURATION: 99 % | HEART RATE: 82 BPM

## 2022-05-16 DIAGNOSIS — R07.2 PRECORDIAL PAIN: Primary | ICD-10-CM

## 2022-05-16 DIAGNOSIS — R73.9 HYPERGLYCEMIA: ICD-10-CM

## 2022-05-16 DIAGNOSIS — R00.2 PALPITATIONS: ICD-10-CM

## 2022-05-16 DIAGNOSIS — E83.51 HYPOCALCEMIA: ICD-10-CM

## 2022-05-16 DIAGNOSIS — E03.9 HYPOTHYROIDISM (ACQUIRED): ICD-10-CM

## 2022-05-16 DIAGNOSIS — E78.5 DYSLIPIDEMIA: ICD-10-CM

## 2022-05-16 PROCEDURE — 99204 OFFICE O/P NEW MOD 45 MIN: CPT | Performed by: INTERNAL MEDICINE

## 2022-05-16 RX ORDER — CETIRIZINE HYDROCHLORIDE 10 MG/1
10 TABLET ORAL AS NEEDED
COMMUNITY

## 2022-05-16 NOTE — PROGRESS NOTES
Cardiology Consult Note    Patient Identification:  Name: Keila Caldwell  Age: 62 y.o.  Sex: female  :  1960  MRN: [id number]             Requesting Physician :  Nito Cervantes MD      Reason for Consultation / Chief Complaint : Chest pain    History of Present Illness:      Ms. Keila Caldwell has PMH of    Hypothyroidism, thyroidectomy  Migraine headaches  GERD  Allergies/intolerance to NSAIDs  GERMAIN and foot surgery  Non-smoker, family history negative for premature CAD.    Here for evaluation of chest pain.  Patient has been noticing chest pain for last 2 months progressively worse in the last 2 weeks.  Has occasional palpitations and fluttering.    's arterial blood pressure is 121/78, heart rate 82, O2 sat of 99% on room air.    Review of records reveal labs from 2022 reveal normal troponin CBC, BMP with a glucose of 106 and calcium of 7.8.  Labs from 2022 reveal TSH of 0.500, normal CMP, cholesterol 256, triglycerides 90, HDL 71, .          Assessment:  :    Chest pain  Palpitations  Hyperlipidemia  Hypothyroidism  Hyperglycemia      Recommendations / Plan:          Reviewed records from ER and summarized.  Reviewed with patient.  Will order CT calcium score to rule stratify patient.  Will schedule a treadmill stress test.  Will follow up and consider further evaluation treatment.  Follow-up with PMD for hyperglycemia and hypocalcemia         Diagnosis Plan   1. Precordial pain  CT Cardiac Calcium Score Without Dye    Treadmill Stress Test   2. Dyslipidemia  CT Cardiac Calcium Score Without Dye    Treadmill Stress Test   3. Palpitations     4. Hyperglycemia     5. Hypocalcemia     6. Hypothyroidism (acquired)                Past Medical History:  Past Medical History:   Diagnosis Date   • Arthritis    • Chronic nausea    • GERD (gastroesophageal reflux disease)    • History of UTI    • Hypothyroidism    • Migraines     ON MED   • PONV (postoperative nausea and vomiting)      Past  Surgical History:  Past Surgical History:   Procedure Laterality Date   • COLONOSCOPY  approx 2008    normal per pt    • COLONOSCOPY W/ POLYPECTOMY N/A 3/7/2019    One 3 mm polyp in the transverse colon. Path:Mild chronic inflammation.    • ENDOSCOPY N/A 3/7/2019    LA Grade A esophagitis   • ENDOSCOPY N/A 4/14/2021    Procedure: ESOPHAGOGASTRODUODENOSCOPY WITH BX;  Surgeon: Aravind Bowers MD;  [place] [facility] [place];  Service: Gastroenterology;  Laterality: N/A;  RUQ PAIN   --IRREGULAR Z-LINE    • FOOT SURGERY Right     CYST   • HYSTERECTOMY     • TONSILLECTOMY     • TOTAL HIP ARTHROPLASTY Left 3/16/2018    Procedure: LT TOTAL HIP ARTHROPLASTY ANTERIOR WITH HANA TABLE;  Surgeon: Sergio Balderas MD;  [place] [facility] [place];  Service: Orthopedics      Allergies:  Allergies   Allergen Reactions   • Nsaids Other (See Comments)     KIDNEY DYSFUNCTION     Home Meds:  Current Meds:     Current Outpatient Medications:   •  acetaminophen (TYLENOL) 500 MG tablet, Take 500 mg by mouth Every 6 (Six) Hours As Needed for Mild Pain . Takes 605mg daily, Disp: , Rfl:   •  atorvastatin (LIPITOR) 10 MG tablet, Take 1 tablet by mouth Daily., Disp: 90 tablet, Rfl: 3  •  calcitriol (ROCALTROL) 0.5 MCG capsule, Take 0.5 mcg by mouth 2 (Two) Times a Day., Disp: , Rfl:   •  CBD (cannabidiol) oral oil, Take  by mouth. 3000 mg at night, Disp: , Rfl:   •  cetirizine (zyrTEC) 10 MG tablet, Take 10 mg by mouth As Needed for Allergies., Disp: , Rfl:   •  estradiol (ESTRACE) 0.1 MG/GM vaginal cream, Insert 1 applicator into the vagina 2 (Two) Times a Week., Disp: , Rfl: 6  •  levothyroxine (SYNTHROID, LEVOTHROID) 88 MCG tablet, Take 88 mcg by mouth Daily., Disp: , Rfl:   •  liothyronine (CYTOMEL) 5 MCG tablet, Take 5 mcg by mouth Daily., Disp: , Rfl:   •  Magnesium 500 MG capsule, Take 1 capsule by mouth Daily., Disp: , Rfl:   •  onabotulinumtoxina (Botox) 100 units reconstituted solution injection, 1 (One) Time., Disp: ,  Rfl:   •  pantoprazole (PROTONIX) 40 MG EC tablet, Take 1 tablet by mouth Daily., Disp: , Rfl:   •  rizatriptan (MAXALT) 10 MG tablet, Take 10 mg by mouth As Needed. TAKE 1 TABLET BY MOUTH ONCE AS NEEDED FOR MIGRAINE FOR UP TO 1 DOSE MAY REPEAT IN 2 HOURS IF NEEDED, Disp: , Rfl: 5  •  vitamin D (ERGOCALCIFEROL) 37225 units capsule capsule, Take 50,000 Units by mouth Every 30 (Thirty) Days., Disp: , Rfl: 1  •  acetaminophen-codeine (TYLENOL #3) 300-30 MG per tablet, Take 1 tablet by mouth Every 6 (Six) Hours As Needed for Moderate Pain ., Disp: 12 tablet, Rfl: 0  •  Aimovig 140 MG/ML prefilled syringe, INJECT 140 MG INTO THE SKIN EVERY 30 (THIRTY) DAYS., Disp: , Rfl:   •  CALCIUM PO, Take 1,000 mg by mouth 4 (Four) Times a Day. CHEWABLE TABLETS, Disp: , Rfl:   •  ciprofloxacin (CIPRO) 500 MG tablet, Take 1 tablet by mouth 2 (Two) Times a Day., Disp: 14 tablet, Rfl: 0  •  Erenumab-aooe (Aimovig) 140 MG/ML prefilled syringe, INJECT 140 MG INTO THE SKIN EVERY 30 (THIRTY) DAYS., Disp: , Rfl:   •  ergocalciferol (ERGOCALCIFEROL) 1.25 MG (70769 UT) capsule, Take 1 capsule by mouth Every 30 (Thirty) Days., Disp: , Rfl:   •  guaiFENesin 200 MG tablet, Take 2 tablets by mouth Every 6 (Six) Hours As Needed for Cough., Disp: 40 tablet, Rfl: 0  •  Omega-3 Fatty Acids (Fish Oil Pearls) 300 MG capsule, Take  by mouth. 4 tablets daily, Disp: , Rfl:   •  pantoprazole (PROTONIX) 40 MG EC tablet, Take 1 tablet by mouth Every Morning., Disp: 90 tablet, Rfl: 3  •  promethazine-dextromethorphan (PROMETHAZINE-DM) 6.25-15 MG/5ML syrup, Take 5 mL by mouth At Night As Needed for Cough., Disp: 90 mL, Rfl: 0  Social History:   Social History     Tobacco Use   • Smoking status: Never Smoker   • Smokeless tobacco: Never Used   Substance Use Topics   • Alcohol use: Yes     Alcohol/week: 1.0 standard drink     Types: 1 Glasses of wine per week     Comment: once a month or so      Family History:  Family History   Problem Relation Age of Onset   •  "No Known Problems Mother    • Stomach cancer Father    • Colon polyps Father    • No Known Problems Sister    • No Known Problems Brother    • No Known Problems Son    • No Known Problems Daughter    • No Known Problems Maternal Grandmother    • No Known Problems Maternal Grandfather    • No Known Problems Paternal Grandmother    • No Known Problems Paternal Grandfather    • No Known Problems Cousin    • Malig Hyperthermia Neg Hx         Review of Systems : Review of Systems   Constitutional: Negative for fever and malaise/fatigue.   HENT: Negative for ear pain and nosebleeds.    Eyes: Negative for blurred vision and double vision.   Cardiovascular: Positive for chest pain. Negative for dyspnea on exertion and palpitations.   Respiratory: Negative for cough and shortness of breath.    Skin: Negative for rash.   Musculoskeletal: Negative for joint pain.   Gastrointestinal: Negative for abdominal pain, nausea and vomiting.   Neurological: Positive for light-headedness. Negative for focal weakness and headaches.   Psychiatric/Behavioral: Negative for depression. The patient is not nervous/anxious.    All other systems reviewed and are negative.               Constitutional:       Physical Exam   /78 (BP Location: Right arm, Patient Position: Sitting, Cuff Size: Adult)   Pulse 82   Ht 170.2 cm (67\")   Wt 71.7 kg (158 lb)   SpO2 99%   BMI 24.75 kg/m²   Physical Exam  General:  Appears in no acute distress  Eyes: Sclera is anicteric,  conjunctiva is clear   HEENT:  No JVD. Thyroid not visibly enlarged. No mucosal pallor or cyanosis  Respiratory: Respirations regular and unlabored at rest.  Bilaterally good breath sounds, with good air entry in all fields. No crackles, rubs or wheezes auscultated  Cardiovascular: S1,S2 Regular rate and rhythm. No murmur, rub or gallop auscultated. No pretibial pitting edema  Gastrointestinal: Abdomen soft, flat, non tender. Bowel sounds present.   Musculoskeletal:  No abnormal " movements  Extremities: No digital clubbing or cyanosis  Skin: Color pink. Skin warm and dry to touch. No rashes  No xanthoma  Neuro: Alert and awake, no lateralizing deficits appreciated    Cardiographics  ECG: EKG tracing was  personally reviewed/interpreted by me from 4/16/2022 reveals sinus rhythm with a rate of 74 bpm      Imaging  Chest X-ray:   Imaging Results (Last 24 Hours)     ** No results found for the last 24 hours. **          Lab Review: I have reviewed the labs              @[email or web address]                  [facility] Colt Martines MD  5/21/2022, 08:48 EDT      EMR Dragon/Transcription:   Dictated utilizing Dragon dictation

## 2022-05-20 ENCOUNTER — PATIENT ROUNDING (BHMG ONLY) (OUTPATIENT)
Dept: CARDIOLOGY | Facility: CLINIC | Age: 62
End: 2022-05-20

## 2022-05-20 NOTE — PROGRESS NOTES
A My-Chart message has been sent to the patient [place] [facility] [place]

## 2022-05-24 ENCOUNTER — OFFICE (OUTPATIENT)
Dept: URBAN - METROPOLITAN AREA CLINIC 64 | Facility: CLINIC | Age: 62
End: 2022-05-24

## 2022-05-24 VITALS
DIASTOLIC BLOOD PRESSURE: 81 MMHG | HEART RATE: 106 BPM | WEIGHT: 155 LBS | HEIGHT: 68 IN | SYSTOLIC BLOOD PRESSURE: 122 MMHG

## 2022-05-24 DIAGNOSIS — K59.00 CONSTIPATION, UNSPECIFIED: ICD-10-CM

## 2022-05-24 DIAGNOSIS — R14.3 FLATULENCE: ICD-10-CM

## 2022-05-24 DIAGNOSIS — R10.9 UNSPECIFIED ABDOMINAL PAIN: ICD-10-CM

## 2022-05-24 PROCEDURE — 99204 OFFICE O/P NEW MOD 45 MIN: CPT | Performed by: NURSE PRACTITIONER

## 2022-06-06 RX ORDER — PANTOPRAZOLE SODIUM 40 MG/1
TABLET, DELAYED RELEASE ORAL
Qty: 90 TABLET | Refills: 3 | Status: SHIPPED | OUTPATIENT
Start: 2022-06-06

## 2022-06-30 ENCOUNTER — HOSPITAL ENCOUNTER (OUTPATIENT)
Dept: CT IMAGING | Facility: HOSPITAL | Age: 62
Discharge: HOME | End: 2022-06-30
Admitting: INTERNAL MEDICINE

## 2022-06-30 DIAGNOSIS — E78.5 DYSLIPIDEMIA: ICD-10-CM

## 2022-06-30 DIAGNOSIS — R07.2 PRECORDIAL PAIN: ICD-10-CM

## 2022-06-30 PROCEDURE — 75571 CT HRT W/O DYE W/CA TEST: CPT

## 2022-07-06 ENCOUNTER — TELEPHONE (OUTPATIENT)
Dept: CARDIOLOGY | Facility: CLINIC | Age: 62
End: 2022-07-06

## 2022-07-07 ENCOUNTER — OFFICE VISIT (OUTPATIENT)
Dept: CARDIOLOGY | Facility: CLINIC | Age: 62
End: 2022-07-07

## 2022-07-07 ENCOUNTER — HOSPITAL ENCOUNTER (OUTPATIENT)
Dept: CARDIOLOGY | Facility: HOSPITAL | Age: 62
Discharge: HOME | End: 2022-07-07
Admitting: INTERNAL MEDICINE

## 2022-07-07 VITALS
HEART RATE: 93 BPM | SYSTOLIC BLOOD PRESSURE: 122 MMHG | HEIGHT: 67 IN | WEIGHT: 158 LBS | BODY MASS INDEX: 24.8 KG/M2 | DIASTOLIC BLOOD PRESSURE: 80 MMHG

## 2022-07-07 DIAGNOSIS — E78.5 DYSLIPIDEMIA: ICD-10-CM

## 2022-07-07 DIAGNOSIS — R73.9 HYPERGLYCEMIA: ICD-10-CM

## 2022-07-07 DIAGNOSIS — E03.9 HYPOTHYROIDISM (ACQUIRED): ICD-10-CM

## 2022-07-07 DIAGNOSIS — R07.2 PRECORDIAL PAIN: ICD-10-CM

## 2022-07-07 DIAGNOSIS — R07.2 PRECORDIAL PAIN: Primary | ICD-10-CM

## 2022-07-07 LAB
BH CV STRESS BP STAGE 1: NORMAL
BH CV STRESS BP STAGE 2: NORMAL
BH CV STRESS DURATION MIN STAGE 1: 3
BH CV STRESS DURATION MIN STAGE 2: 3
BH CV STRESS DURATION MIN STAGE 3: 3
BH CV STRESS DURATION SEC STAGE 1: 0
BH CV STRESS DURATION SEC STAGE 2: 0
BH CV STRESS DURATION SEC STAGE 3: 0
BH CV STRESS GRADE STAGE 1: 10
BH CV STRESS GRADE STAGE 2: 12
BH CV STRESS GRADE STAGE 3: 14
BH CV STRESS HR STAGE 1: 138
BH CV STRESS HR STAGE 2: 160
BH CV STRESS HR STAGE 3: 162
BH CV STRESS METS STAGE 1: 5
BH CV STRESS METS STAGE 2: 7.5
BH CV STRESS METS STAGE 3: 10
BH CV STRESS PROTOCOL 1: NORMAL
BH CV STRESS RECOVERY BP: NORMAL MMHG
BH CV STRESS RECOVERY HR: 114 BPM
BH CV STRESS SPEED STAGE 1: 1.7
BH CV STRESS SPEED STAGE 2: 2.5
BH CV STRESS SPEED STAGE 3: 3.4
BH CV STRESS STAGE 1: 1
BH CV STRESS STAGE 2: 2
BH CV STRESS STAGE 3: 3
MAXIMAL PREDICTED HEART RATE: 160 BPM
STRESS BASELINE BP: NORMAL MMHG
STRESS BASELINE HR: 90 BPM
STRESS POST EXERCISE DUR MIN: 6 MIN
STRESS POST EXERCISE DUR SEC: 13 SEC
STRESS TARGET HR: 136 BPM

## 2022-07-07 PROCEDURE — 93018 CV STRESS TEST I&R ONLY: CPT | Performed by: INTERNAL MEDICINE

## 2022-07-07 PROCEDURE — 99213 OFFICE O/P EST LOW 20 MIN: CPT | Performed by: INTERNAL MEDICINE

## 2022-07-07 PROCEDURE — 93017 CV STRESS TEST TRACING ONLY: CPT

## 2022-07-07 NOTE — PROGRESS NOTES
Subjective:     Encounter Date:07/07/2022      Patient ID: Keila Caldwell is a 62 y.o. female.    Chief Complaint : Follow-up for stress test and CT calcium score.  And chest pain.    History of Present Illness        Ms. Keila Caldwell has PMH of     Hypothyroidism, thyroidectomy  Migraine headaches  GERD  Allergies/intolerance to NSAIDs  GERMAIN and foot surgery  Non-smoker, family history negative for premature CAD.     Here for follow-up for treadmill stress test and CT calcium score.  Patient was seen for evaluation of chest pain.  Patient has been noticing chest pain for last 2 months progressively worse in the last 2 weeks.  Has occasional palpitations and fluttering.     Patient's arterial blood pressure is 122/80, heart rate 93 bpm.     Review of records reveal labs from 4/16/2022 reveal normal troponin CBC, BMP with a glucose of 106 and calcium of 7.8.  Labs from 4/8/2022 reveal TSH of 0.500, normal CMP, cholesterol 256, triglycerides 90, HDL 71, .     Patient had CT calcium score which was low at 14.  With 11 and LAD and 3 and RCA.    Patient had treadmill stress test where she wore 6.13 minutes achieved 100% heart rate without EKG changes or chest pain.        Assessment:  :     Chest pain  Palpitations  Hyperlipidemia  Hypothyroidism  Hyperglycemia        Recommendations / Plan:            Reviewed treadmill stress test results and CT calcium score results with patient.  Counseled on diet exercise.  Advised follow-up with PMD for GERD.  Follow-up with PMD for hyperglycemia and hypocalcemia       Procedures CT CT calcium score and treadmill results.    The following portions of the patient's history were reviewed and updated as appropriate: allergies, current medications, past family history, past medical history, past social history, past surgical history and problem list.    Assessment:         MDM     Diagnosis Plan   1. Precordial pain     2. Dyslipidemia     3. Hyperglycemia     4.  Hypothyroidism (acquired)            Plan:               Past Medical History:  Past Medical History:   Diagnosis Date   • Arthritis    • Chronic nausea    • GERD (gastroesophageal reflux disease)    • History of UTI    • Hyperlipidemia 6 month ago   • Hypothyroidism    • Migraines     ON MED   • PONV (postoperative nausea and vomiting)      Past Surgical History:  Past Surgical History:   Procedure Laterality Date   • COLONOSCOPY  approx 2008    normal per pt    • COLONOSCOPY W/ POLYPECTOMY N/A 03/07/2019    One 3 mm polyp in the transverse colon. Path:Mild chronic inflammation.    • ENDOSCOPY N/A 03/07/2019    LA Grade A esophagitis   • ENDOSCOPY N/A 04/14/2021    Procedure: ESOPHAGOGASTRODUODENOSCOPY WITH BX;  Surgeon: Aravind Bowers MD;  Location: [facility] ENDOSCOPY;  Service: Gastroenterology;  Laterality: N/A;  RUQ PAIN   --IRREGULAR Z-LINE    • FOOT SURGERY Right     CYST   • HYSTERECTOMY     • TONSILLECTOMY     • TOTAL HIP ARTHROPLASTY Left 03/16/2018    Procedure: LT TOTAL HIP ARTHROPLASTY ANTERIOR WITH HANA TABLE;  Surgeon: Sergio Balderas MD;  Location: [facility] MAIN OR;  Service: Orthopedics      Allergies:  Allergies   Allergen Reactions   • Nsaids Other (See Comments)     KIDNEY DYSFUNCTION     Home Meds:  Current Meds:     Current Outpatient Medications:   •  acetaminophen (TYLENOL) 500 MG tablet, Take 500 mg by mouth Every 6 (Six) Hours As Needed for Mild Pain . Takes 605mg daily, Disp: , Rfl:   •  acetaminophen-codeine (TYLENOL #3) 300-30 MG per tablet, Take 1 tablet by mouth Every 6 (Six) Hours As Needed for Moderate Pain ., Disp: 12 tablet, Rfl: 0  •  Aimovig 140 MG/ML prefilled syringe, INJECT 140 MG INTO THE SKIN EVERY 30 (THIRTY) DAYS., Disp: , Rfl:   •  atorvastatin (LIPITOR) 10 MG tablet, Take 1 tablet by mouth Daily., Disp: 90 tablet, Rfl: 3  •  calcitriol (ROCALTROL) 0.5 MCG capsule, Take 0.5 mcg by mouth 2 (Two) Times a Day., Disp: , Rfl:   •  CALCIUM PO, Take 1,000 mg by mouth  4 (Four) Times a Day. CHEWABLE TABLETS, Disp: , Rfl:   •  CBD (cannabidiol) oral oil, Take  by mouth. 3000 mg at night, Disp: , Rfl:   •  cetirizine (zyrTEC) 10 MG tablet, Take 10 mg by mouth As Needed for Allergies., Disp: , Rfl:   •  ciprofloxacin (CIPRO) 500 MG tablet, Take 1 tablet by mouth 2 (Two) Times a Day., Disp: 14 tablet, Rfl: 0  •  Erenumab-aooe (Aimovig) 140 MG/ML prefilled syringe, INJECT 140 MG INTO THE SKIN EVERY 30 (THIRTY) DAYS., Disp: , Rfl:   •  ergocalciferol (ERGOCALCIFEROL) 1.25 MG (47886 UT) capsule, Take 1 capsule by mouth Every 30 (Thirty) Days., Disp: , Rfl:   •  estradiol (ESTRACE) 0.1 MG/GM vaginal cream, Insert 1 applicator into the vagina 2 (Two) Times a Week., Disp: , Rfl: 6  •  guaiFENesin 200 MG tablet, Take 2 tablets by mouth Every 6 (Six) Hours As Needed for Cough., Disp: 40 tablet, Rfl: 0  •  levothyroxine (SYNTHROID, LEVOTHROID) 88 MCG tablet, Take 88 mcg by mouth Daily., Disp: , Rfl:   •  liothyronine (CYTOMEL) 5 MCG tablet, Take 5 mcg by mouth Daily., Disp: , Rfl:   •  Magnesium 500 MG capsule, Take 1 capsule by mouth Daily., Disp: , Rfl:   •  Omega-3 Fatty Acids (Fish Oil Pearls) 300 MG capsule, Take  by mouth. 4 tablets daily, Disp: , Rfl:   •  onabotulinumtoxina (Botox) 100 units reconstituted solution injection, 1 (One) Time., Disp: , Rfl:   •  pantoprazole (PROTONIX) 40 MG EC tablet, TAKE 1 TABLET BY MOUTH EVERY MORNING., Disp: 90 tablet, Rfl: 3  •  promethazine-dextromethorphan (PROMETHAZINE-DM) 6.25-15 MG/5ML syrup, Take 5 mL by mouth At Night As Needed for Cough., Disp: 90 mL, Rfl: 0  •  rizatriptan (MAXALT) 10 MG tablet, Take 10 mg by mouth As Needed. TAKE 1 TABLET BY MOUTH ONCE AS NEEDED FOR MIGRAINE FOR UP TO 1 DOSE MAY REPEAT IN 2 HOURS IF NEEDED, Disp: , Rfl: 5  •  vitamin D (ERGOCALCIFEROL) 51735 units capsule capsule, Take 50,000 Units by mouth Every 30 (Thirty) Days., Disp: , Rfl: 1  Social History:   Social History     Tobacco Use   • Smoking status: Never  "Smoker   • Smokeless tobacco: Never Used   Substance Use Topics   • Alcohol use: Yes     Alcohol/week: 1.0 standard drink     Types: 1 Glasses of wine per week     Comment: once a month or so      Family History:  Family History   Problem Relation Age of Onset   • Hyperlipidemia Mother    • Stomach cancer Father    • Colon polyps Father    • No Known Problems Sister    • No Known Problems Brother    • No Known Problems Son    • No Known Problems Daughter    • No Known Problems Maternal Grandmother    • No Known Problems Maternal Grandfather    • No Known Problems Paternal Grandmother    • No Known Problems Paternal Grandfather    • No Known Problems Cousin    • Malig Hyperthermia Neg Hx               Review of Systems   Cardiovascular: Negative for chest pain, leg swelling and palpitations.   Respiratory: Negative for shortness of breath.    Neurological: Negative for dizziness and numbness.     All other systems are negative         Objective:     Physical Exam  /80 (BP Location: Left arm, Patient Position: Sitting, Cuff Size: Adult)   Pulse 93   Ht 170.2 cm (67\")   Wt 71.7 kg (158 lb)   BMI 24.75 kg/m²   General:  Appears in no acute distress  Eyes: Sclera is anicteric,  conjunctiva is clear   HEENT:  No JVD.  No carotid bruits  Respiratory: Respirations regular and unlabored at rest.  Clear to auscultation  Cardiovascular: S1,S2 Regular rate and rhythm. No murmur, rub or gallop auscultated.   Extremities: No digital clubbing or cyanosis, no edema  Skin: Color pink. Skin warm and dry to touch. No rashes  No xanthoma  Neuro: Alert and awake.    Lab Reviewed:         Garth Martines MD  7/7/2022 14:58 EDT      EMR Dragon/Transcription:   \"Dictated utilizing Dragon dictation\".        "

## 2022-07-27 ENCOUNTER — OFFICE (OUTPATIENT)
Dept: URBAN - METROPOLITAN AREA CLINIC 64 | Facility: CLINIC | Age: 62
End: 2022-07-27
Payer: COMMERCIAL

## 2022-07-27 VITALS
DIASTOLIC BLOOD PRESSURE: 80 MMHG | WEIGHT: 153 LBS | SYSTOLIC BLOOD PRESSURE: 114 MMHG | HEART RATE: 92 BPM | HEIGHT: 68 IN

## 2022-07-27 DIAGNOSIS — R10.9 UNSPECIFIED ABDOMINAL PAIN: ICD-10-CM

## 2022-07-27 DIAGNOSIS — K59.00 CONSTIPATION, UNSPECIFIED: ICD-10-CM

## 2022-07-27 DIAGNOSIS — R14.3 FLATULENCE: ICD-10-CM

## 2022-07-27 PROCEDURE — 99213 OFFICE O/P EST LOW 20 MIN: CPT | Performed by: NURSE PRACTITIONER

## 2022-09-29 ENCOUNTER — HOSPITAL ENCOUNTER (OUTPATIENT)
Facility: HOSPITAL | Age: 62
Discharge: HOME | End: 2022-09-29
Attending: EMERGENCY MEDICINE

## 2022-09-29 VITALS
RESPIRATION RATE: 18 BRPM | DIASTOLIC BLOOD PRESSURE: 88 MMHG | BODY MASS INDEX: 23.49 KG/M2 | HEIGHT: 68 IN | WEIGHT: 155 LBS | SYSTOLIC BLOOD PRESSURE: 135 MMHG | OXYGEN SATURATION: 100 % | HEART RATE: 93 BPM | TEMPERATURE: 98 F

## 2022-09-29 DIAGNOSIS — L03.317 CELLULITIS OF BUTTOCK: Primary | ICD-10-CM

## 2022-09-29 DIAGNOSIS — J06.9 VIRAL URI WITH COUGH: ICD-10-CM

## 2022-09-29 LAB
FLUAV SUBTYP SPEC NAA+PROBE: NOT DETECTED
FLUBV RNA ISLT QL NAA+PROBE: NOT DETECTED
SARS-COV-2 RNA RESP QL NAA+PROBE: NOT DETECTED
STREP A PCR: NOT DETECTED

## 2022-09-29 PROCEDURE — 87636 SARSCOV2 & INF A&B AMP PRB: CPT | Performed by: EMERGENCY MEDICINE

## 2022-09-29 PROCEDURE — EDLOS: Performed by: EMERGENCY MEDICINE

## 2022-09-29 PROCEDURE — 87651 STREP A DNA AMP PROBE: CPT | Performed by: EMERGENCY MEDICINE

## 2022-09-29 PROCEDURE — G0463 HOSPITAL OUTPT CLINIC VISIT: HCPCS | Performed by: EMERGENCY MEDICINE

## 2022-09-29 PROCEDURE — 99213 OFFICE O/P EST LOW 20 MIN: CPT | Performed by: EMERGENCY MEDICINE

## 2022-09-29 RX ORDER — SULFAMETHOXAZOLE AND TRIMETHOPRIM 800; 160 MG/1; MG/1
2 TABLET ORAL 2 TIMES DAILY
Qty: 28 TABLET | Refills: 0 | Status: SHIPPED | OUTPATIENT
Start: 2022-09-29

## 2022-12-20 ENCOUNTER — APPOINTMENT (OUTPATIENT)
Dept: WOMENS IMAGING | Facility: HOSPITAL | Age: 62
End: 2022-12-20
Payer: COMMERCIAL

## 2022-12-20 PROCEDURE — 77067 SCR MAMMO BI INCL CAD: CPT | Performed by: RADIOLOGY

## 2022-12-20 PROCEDURE — 77063 BREAST TOMOSYNTHESIS BI: CPT | Performed by: RADIOLOGY

## 2023-02-17 RX ORDER — ATORVASTATIN CALCIUM 10 MG/1
TABLET, FILM COATED ORAL
Qty: 90 TABLET | Refills: 0 | Status: SHIPPED | OUTPATIENT
Start: 2023-02-17

## 2023-05-01 RX ORDER — ATORVASTATIN CALCIUM 10 MG/1
TABLET, FILM COATED ORAL
Qty: 90 TABLET | Refills: 0 | Status: SHIPPED | OUTPATIENT
Start: 2023-05-01

## 2023-05-02 RX ORDER — PANTOPRAZOLE SODIUM 40 MG/1
TABLET, DELAYED RELEASE ORAL
Qty: 90 TABLET | Refills: 0 | Status: SHIPPED | OUTPATIENT
Start: 2023-05-02

## 2023-06-22 ENCOUNTER — OFFICE (OUTPATIENT)
Dept: URBAN - METROPOLITAN AREA CLINIC 64 | Facility: CLINIC | Age: 63
End: 2023-06-22

## 2023-06-22 VITALS
HEIGHT: 68 IN | SYSTOLIC BLOOD PRESSURE: 107 MMHG | HEART RATE: 87 BPM | DIASTOLIC BLOOD PRESSURE: 68 MMHG | WEIGHT: 152 LBS

## 2023-06-22 DIAGNOSIS — K21.9 GASTRO-ESOPHAGEAL REFLUX DISEASE WITHOUT ESOPHAGITIS: ICD-10-CM

## 2023-06-22 DIAGNOSIS — K59.00 CONSTIPATION, UNSPECIFIED: ICD-10-CM

## 2023-06-22 DIAGNOSIS — Z86.010 PERSONAL HISTORY OF COLONIC POLYPS: ICD-10-CM

## 2023-06-22 PROCEDURE — 99214 OFFICE O/P EST MOD 30 MIN: CPT | Performed by: NURSE PRACTITIONER

## 2023-06-22 RX ORDER — PANTOPRAZOLE 40 MG/1
40 TABLET, DELAYED RELEASE ORAL
Qty: 90 | Refills: 3 | Status: COMPLETED
End: 2024-06-14

## 2023-09-14 ENCOUNTER — OFFICE VISIT (OUTPATIENT)
Dept: CARDIOLOGY | Facility: CLINIC | Age: 63
End: 2023-09-14
Payer: COMMERCIAL

## 2023-09-14 VITALS
HEIGHT: 68 IN | OXYGEN SATURATION: 99 % | DIASTOLIC BLOOD PRESSURE: 86 MMHG | WEIGHT: 150 LBS | HEART RATE: 94 BPM | BODY MASS INDEX: 22.73 KG/M2 | SYSTOLIC BLOOD PRESSURE: 127 MMHG

## 2023-09-14 DIAGNOSIS — R00.2 PALPITATIONS: Primary | ICD-10-CM

## 2023-09-14 DIAGNOSIS — E78.5 DYSLIPIDEMIA: ICD-10-CM

## 2023-09-14 DIAGNOSIS — E03.9 HYPOTHYROIDISM (ACQUIRED): ICD-10-CM

## 2023-09-14 PROCEDURE — 99214 OFFICE O/P EST MOD 30 MIN: CPT | Performed by: INTERNAL MEDICINE

## 2023-09-14 RX ORDER — NARATRIPTAN 2.5 MG/1
2.5 TABLET ORAL
COMMUNITY
Start: 2023-08-28

## 2023-09-14 NOTE — PROGRESS NOTES
Subjective:     Encounter Date:09/14/2023      Patient ID: Keila Caldwell is a 63 y.o. female.    Chief Complaint and history of present illness:     Follow-up for stress test and CT calcium score.  And chest pain.     History of Present Illness          Ms. Keila Caldwell has PMH of     CAD by CT calcium score of 14(11 LAD and 3 RCA)  Hyperlipidemia  Hypothyroidism, thyroidectomy  Migraine headaches  GERD  Allergies/intolerance to NSAIDs  GERMAIN and foot surgery  Non-smoker, family history negative for premature CAD.    For an acute visit because of symptoms.  Patient was seen for chest pains and palpitations and fluttering.  Underwent treadmill which was negative CT calcium score was 14.  [place] had an episode palpitations where her heart was racing and she felt weak in her legs.  Went to urgent care center they sent her to ER..  EKG 9/2/2023 reviewed/intubated by me reveals sinus tach at the rate of 102 bpm chest x-ray reveals no cardiopulmonary abnormality.  Labs reveal normal CBC CMP with a creatinine of 1.16 and EGFR 52 D-dimer negative at 0.36 HS troponin elevated at 14.6.  Repeat was 13.3.     Here for follow-up for treadmill stress test and CT calcium score.  Patient was seen for evaluation of chest pain.  Patient has been noticing chest pain for last 2 months progressively worse in the last 2 weeks.  Has occasional palpitations and fluttering.     Patient's arterial blood pressure is 127/86, heart rate 94, O2 sat of 99% on room air.     Review of records reveal labs from 4/16/2022 reveal normal troponin CBC, BMP with a glucose of 106 and calcium of 7.8.  Labs from 4/8/2022 reveal TSH of 0.500, normal CMP, cholesterol 256, triglycerides 90, HDL 71, .     Patient had CT calcium score which was low at 14.  With 11 and LAD and 3 and RCA.     Patient had treadmill stress test where she wore 6.13 minutes achieved 100% heart rate without EKG changes or chest pain.         Assessment:  :       Palpitations  Hyperlipidemia  Hypothyroidism  Hyperglycemia        Recommendations / Plan:            Reviewed data and summarized.  Reviewed results with patient.  Need to catch the culprit rhythm causing her symptoms.  Discussed options about Kardia mobile versus 21-day event/Holter monitor.  Patient wants to do both.  We will give pill antibiotics with metoprolol prescription as needed.  Follow-up with PMD for hyperglycemia and hypocalcemia        Procedures CT CT calcium score and treadmill results.      Procedures  EKG done 9/2/2023 reviewed/interpreted by me reveals sinus tachycardia with rate of 102 bpm.    Copied text in this portion of the note has been reviewed and is accurate as of 9/14/2023  The following portions of the patient's history were reviewed and updated as appropriate: allergies, current medications, past family history, past medical history, past social history, past surgical history and problem list.    Assessment:         MDM      No diagnosis found.       Plan:               Past Medical History:  Past Medical History:   Diagnosis Date    Arthritis     Chronic nausea     GERD (gastroesophageal reflux disease)     History of UTI     Hyperlipidemia 6 month ago    Hypothyroidism     Migraines     ON MED    PONV (postoperative nausea and vomiting)      Past Surgical History:  Past Surgical History:   Procedure Laterality Date    COLONOSCOPY  approx 2008    normal per pt     COLONOSCOPY W/ POLYPECTOMY N/A 03/07/2019    One 3 mm polyp in the transverse colon. Path:Mild chronic inflammation.     ENDOSCOPY N/A 03/07/2019    LA Grade A esophagitis    ENDOSCOPY N/A 04/14/2021    Procedure: ESOPHAGOGASTRODUODENOSCOPY WITH BX;  Surgeon: Aravind Bowers MD;  Location: [facility] ENDOSCOPY;  Service: Gastroenterology;  Laterality: N/A;  RUQ PAIN   --IRREGULAR Z-LINE     FOOT SURGERY Right     CYST    HYSTERECTOMY      TONSILLECTOMY      TOTAL HIP ARTHROPLASTY Left 03/16/2018     Procedure: LT TOTAL HIP ARTHROPLASTY ANTERIOR WITH HANA TABLE;  Surgeon: Sergio Balderas MD;  Location: [facility] MAIN OR;  Service: Orthopedics      Allergies:  Allergies   Allergen Reactions    Aspirin Unknown - High Severity    Nsaids Other (See Comments)     KIDNEY DYSFUNCTION     Home Meds:  Current Meds:     Current Outpatient Medications:     acetaminophen (TYLENOL) 500 MG tablet, Take 1 tablet by mouth Every 6 (Six) Hours As Needed for Mild Pain. Takes 605mg daily, Disp: , Rfl:     atorvastatin (LIPITOR) 10 MG tablet, TAKE 1 TABLET BY MOUTH EVERY DAY, Disp: 90 tablet, Rfl: 0    calcitriol (ROCALTROL) 0.5 MCG capsule, Take 1 capsule by mouth 2 (Two) Times a Day., Disp: , Rfl:     CALCIUM PO, Take 1,000 mg by mouth 4 (Four) Times a Day. CHEWABLE TABLETS, Disp: , Rfl:     CBD (cannabidiol) oral oil, Take  by mouth. 3000 mg at night, Disp: , Rfl:     cetirizine (zyrTEC) 10 MG tablet, Take 1 tablet by mouth As Needed for Allergies., Disp: , Rfl:     cyclobenzaprine (FLEXERIL) 10 MG tablet, Take 1 tablet by mouth 3 (Three) Times a Day As Needed for Muscle Spasms., Disp: 21 tablet, Rfl: 0    estradiol (ESTRACE) 0.1 MG/GM vaginal cream, Insert 1 applicator into the vagina 2 (Two) Times a Week., Disp: , Rfl: 6    levothyroxine (SYNTHROID, LEVOTHROID) 88 MCG tablet, Take 1 tablet by mouth Daily., Disp: , Rfl:     liothyronine (CYTOMEL) 5 MCG tablet, Take 1 tablet by mouth Daily., Disp: , Rfl:     Magnesium 500 MG capsule, Take 1 capsule by mouth Daily., Disp: , Rfl:     naratriptan (AMERGE) 2.5 MG tablet, Take 1 tablet by mouth., Disp: , Rfl:     onabotulinumtoxina (Botox) 100 units reconstituted solution injection, 1 (One) Time., Disp: , Rfl:     pantoprazole (PROTONIX) 40 MG EC tablet, TAKE 1 TABLET BY MOUTH EVERY DAY IN THE MORNING, Disp: 90 tablet, Rfl: 0    vitamin D (ERGOCALCIFEROL) 05254 units capsule capsule, Take 1 capsule by mouth Every 30 (Thirty) Days., Disp: , Rfl: 1  Social History:   Social History  "    Tobacco Use    Smoking status: Never    Smokeless tobacco: Never   Substance Use Topics    Alcohol use: Yes     Alcohol/week: 1.0 standard drink     Types: 1 Glasses of wine per week     Comment: once a month or so      Family History:  Family History   Problem Relation Age of Onset    Hyperlipidemia Mother     Stomach cancer Father     Colon polyps Father     No Known Problems Sister     No Known Problems Brother     No Known Problems Son     No Known Problems Daughter     No Known Problems Maternal Grandmother     No Known Problems Maternal Grandfather     No Known Problems Paternal Grandmother     No Known Problems Paternal Grandfather     No Known Problems Cousin     Malig Hyperthermia Neg Hx               Review of Systems   Cardiovascular:  Negative for chest pain, leg swelling and palpitations.   Respiratory:  Negative for shortness of breath.    Neurological:  Negative for dizziness and numbness.   All other systems are negative         Objective:     Physical Exam  /86 (BP Location: Left arm, Patient Position: Sitting, Cuff Size: Adult)   Pulse 94   Ht 172.7 cm (68\")   Wt 68 kg (150 lb)   SpO2 99%   BMI 22.81 kg/m²   General:  Appears in no acute distress  Eyes: Sclera is anicteric,  conjunctiva is clear   HEENT:  No JVD.  No carotid bruits  Respiratory: Respirations regular and unlabored at rest.  Clear to auscultation  Cardiovascular: S1,S2 Regular rate and rhythm. No murmur, rub or gallop auscultated.   Extremities: No digital clubbing or cyanosis, no edema  Skin: Color pink. Skin warm and dry to touch. No rashes  No xanthoma  Neuro: Alert and awake.    Lab Reviewed:         Garth Martines MD  9/14/2023 13:56 EDT      EMR Dragon/Transcription:   \"Dictated utilizing Dragon dictation\".        "

## 2023-09-19 ENCOUNTER — TELEPHONE (OUTPATIENT)
Dept: CARDIOLOGY | Facility: CLINIC | Age: 63
End: 2023-09-19
Payer: COMMERCIAL

## 2023-09-19 NOTE — TELEPHONE ENCOUNTER
CALLED PATIENT BACK TO ADVISE THAT IT IS OKAY TO BRING THE HOLTER BACK ON OCT6; HOWEVER, WE JUST WILL NOT HAVE ANY DATA OR REPORTS UNTIL THE MONITOR IS RETURNED. PATIENT VOICED HER UNDERSTANDING.

## 2023-10-19 ENCOUNTER — TELEPHONE (OUTPATIENT)
Dept: CARDIOLOGY | Facility: CLINIC | Age: 63
End: 2023-10-19
Payer: COMMERCIAL

## 2023-10-19 DIAGNOSIS — R00.2 PALPITATIONS: Primary | ICD-10-CM

## 2023-10-20 ENCOUNTER — LAB (OUTPATIENT)
Dept: LAB | Facility: HOSPITAL | Age: 63
End: 2023-10-20
Payer: COMMERCIAL

## 2023-10-20 LAB
ANION GAP SERPL CALCULATED.3IONS-SCNC: 10 MMOL/L (ref 5–15)
BUN SERPL-MCNC: 20 MG/DL (ref 8–23)
BUN/CREAT SERPL: 20 (ref 7–25)
CALCIUM SPEC-SCNC: 8.7 MG/DL (ref 8.6–10.5)
CHLORIDE SERPL-SCNC: 102 MMOL/L (ref 98–107)
CO2 SERPL-SCNC: 27 MMOL/L (ref 22–29)
CREAT SERPL-MCNC: 1 MG/DL (ref 0.57–1)
EGFRCR SERPLBLD CKD-EPI 2021: 64.2 ML/MIN/1.73
GLUCOSE SERPL-MCNC: 76 MG/DL (ref 65–99)
MAGNESIUM SERPL-MCNC: 2.3 MG/DL (ref 1.6–2.4)
POTASSIUM SERPL-SCNC: 3.8 MMOL/L (ref 3.5–5.2)
SODIUM SERPL-SCNC: 139 MMOL/L (ref 136–145)

## 2023-10-20 PROCEDURE — 83735 ASSAY OF MAGNESIUM: CPT | Performed by: INTERNAL MEDICINE

## 2023-10-20 PROCEDURE — 36415 COLL VENOUS BLD VENIPUNCTURE: CPT | Performed by: INTERNAL MEDICINE

## 2023-10-20 PROCEDURE — 80048 BASIC METABOLIC PNL TOTAL CA: CPT | Performed by: INTERNAL MEDICINE

## 2023-10-23 ENCOUNTER — TELEPHONE (OUTPATIENT)
Dept: CARDIOLOGY | Facility: CLINIC | Age: 63
End: 2023-10-23
Payer: COMMERCIAL

## 2023-10-24 ENCOUNTER — TELEPHONE (OUTPATIENT)
Dept: CARDIOLOGY | Facility: CLINIC | Age: 63
End: 2023-10-24
Payer: COMMERCIAL

## 2023-10-24 NOTE — TELEPHONE ENCOUNTER
Pt is requesting a call back at [phone]. Pt states she was called and left a voicemail to raise the dose on her metoprolol. Pt states she is confused.

## 2023-11-14 ENCOUNTER — TRANSCRIBE ORDERS (OUTPATIENT)
Dept: ADMINISTRATIVE | Facility: HOSPITAL | Age: 63
End: 2023-11-14
Payer: COMMERCIAL

## 2023-11-14 ENCOUNTER — OFFICE (OUTPATIENT)
Dept: URBAN - METROPOLITAN AREA CLINIC 64 | Facility: CLINIC | Age: 63
End: 2023-11-14
Payer: COMMERCIAL

## 2023-11-14 VITALS
HEIGHT: 68 IN | SYSTOLIC BLOOD PRESSURE: 77 MMHG | DIASTOLIC BLOOD PRESSURE: 50 MMHG | WEIGHT: 150 LBS | HEART RATE: 68 BPM

## 2023-11-14 DIAGNOSIS — R14.0 ABDOMINAL DISTENSION (GASEOUS): ICD-10-CM

## 2023-11-14 DIAGNOSIS — R10.11 ABDOMINAL PAIN, RIGHT UPPER QUADRANT: Primary | ICD-10-CM

## 2023-11-14 DIAGNOSIS — R14.0 BLOATING: ICD-10-CM

## 2023-11-14 DIAGNOSIS — R10.11 RIGHT UPPER QUADRANT PAIN: ICD-10-CM

## 2023-11-14 PROCEDURE — 99213 OFFICE O/P EST LOW 20 MIN: CPT | Performed by: NURSE PRACTITIONER

## 2023-12-04 ENCOUNTER — HOSPITAL ENCOUNTER (OUTPATIENT)
Dept: NUCLEAR MEDICINE | Facility: HOSPITAL | Age: 63
Discharge: HOME | End: 2023-12-04
Payer: COMMERCIAL

## 2023-12-04 ENCOUNTER — HOSPITAL ENCOUNTER (OUTPATIENT)
Dept: ULTRASOUND IMAGING | Facility: HOSPITAL | Age: 63
Discharge: HOME | End: 2023-12-04
Admitting: NURSE PRACTITIONER
Payer: COMMERCIAL

## 2023-12-04 DIAGNOSIS — R10.11 ABDOMINAL PAIN, RIGHT UPPER QUADRANT: ICD-10-CM

## 2023-12-04 DIAGNOSIS — R14.0 BLOATING: ICD-10-CM

## 2023-12-04 PROCEDURE — A9537 TC99M MEBROFENIN: HCPCS | Performed by: NURSE PRACTITIONER

## 2023-12-04 PROCEDURE — 0 TECHNETIUM TC 99M MEBROFENIN KIT: Performed by: NURSE PRACTITIONER

## 2023-12-04 PROCEDURE — 76705 ECHO EXAM OF ABDOMEN: CPT

## 2023-12-04 PROCEDURE — 78227 HEPATOBIL SYST IMAGE W/DRUG: CPT

## 2023-12-04 RX ORDER — KIT FOR THE PREPARATION OF TECHNETIUM TC 99M MEBROFENIN 45 MG/10ML
1 INJECTION, POWDER, LYOPHILIZED, FOR SOLUTION INTRAVENOUS
Status: COMPLETED | OUTPATIENT
Start: 2023-12-04 | End: 2023-12-04

## 2023-12-04 RX ADMIN — MEBROFENIN 1 DOSE: 45 INJECTION, POWDER, LYOPHILIZED, FOR SOLUTION INTRAVENOUS at 07:57

## 2023-12-21 ENCOUNTER — APPOINTMENT (OUTPATIENT)
Dept: WOMENS IMAGING | Facility: HOSPITAL | Age: 63
End: 2023-12-21
Payer: COMMERCIAL

## 2023-12-21 PROCEDURE — 77067 SCR MAMMO BI INCL CAD: CPT | Performed by: RADIOLOGY

## 2023-12-21 PROCEDURE — 77063 BREAST TOMOSYNTHESIS BI: CPT | Performed by: RADIOLOGY

## 2024-01-10 ENCOUNTER — OFFICE (OUTPATIENT)
Dept: URBAN - METROPOLITAN AREA CLINIC 64 | Facility: CLINIC | Age: 64
End: 2024-01-10

## 2024-01-10 VITALS
SYSTOLIC BLOOD PRESSURE: 101 MMHG | WEIGHT: 150 LBS | DIASTOLIC BLOOD PRESSURE: 65 MMHG | HEIGHT: 68 IN | HEART RATE: 84 BPM

## 2024-01-10 DIAGNOSIS — R10.11 RIGHT UPPER QUADRANT PAIN: ICD-10-CM

## 2024-01-10 DIAGNOSIS — R14.0 ABDOMINAL DISTENSION (GASEOUS): ICD-10-CM

## 2024-01-10 DIAGNOSIS — K59.00 CONSTIPATION, UNSPECIFIED: ICD-10-CM

## 2024-01-10 DIAGNOSIS — Z86.010 PERSONAL HISTORY OF COLONIC POLYPS: ICD-10-CM

## 2024-01-10 PROCEDURE — 99213 OFFICE O/P EST LOW 20 MIN: CPT | Performed by: NURSE PRACTITIONER

## 2024-03-14 ENCOUNTER — OFFICE (OUTPATIENT)
Dept: URBAN - METROPOLITAN AREA PATHOLOGY 19 | Facility: PATHOLOGY | Age: 64
End: 2024-03-14
Payer: COMMERCIAL

## 2024-03-14 ENCOUNTER — ON CAMPUS - OUTPATIENT (OUTPATIENT)
Dept: URBAN - METROPOLITAN AREA HOSPITAL 2 | Facility: HOSPITAL | Age: 64
End: 2024-03-14
Payer: COMMERCIAL

## 2024-03-14 VITALS
HEART RATE: 91 BPM | SYSTOLIC BLOOD PRESSURE: 94 MMHG | SYSTOLIC BLOOD PRESSURE: 115 MMHG | DIASTOLIC BLOOD PRESSURE: 82 MMHG | WEIGHT: 141 LBS | DIASTOLIC BLOOD PRESSURE: 80 MMHG | DIASTOLIC BLOOD PRESSURE: 64 MMHG | OXYGEN SATURATION: 99 % | SYSTOLIC BLOOD PRESSURE: 141 MMHG | DIASTOLIC BLOOD PRESSURE: 52 MMHG | SYSTOLIC BLOOD PRESSURE: 109 MMHG | HEIGHT: 68 IN | OXYGEN SATURATION: 96 % | HEART RATE: 96 BPM | RESPIRATION RATE: 14 BRPM | DIASTOLIC BLOOD PRESSURE: 65 MMHG | SYSTOLIC BLOOD PRESSURE: 91 MMHG | SYSTOLIC BLOOD PRESSURE: 113 MMHG | SYSTOLIC BLOOD PRESSURE: 114 MMHG | HEART RATE: 89 BPM | RESPIRATION RATE: 16 BRPM | HEART RATE: 98 BPM | DIASTOLIC BLOOD PRESSURE: 79 MMHG | RESPIRATION RATE: 15 BRPM | HEART RATE: 80 BPM | OXYGEN SATURATION: 100 % | DIASTOLIC BLOOD PRESSURE: 75 MMHG | OXYGEN SATURATION: 97 % | RESPIRATION RATE: 19 BRPM | RESPIRATION RATE: 18 BRPM | DIASTOLIC BLOOD PRESSURE: 85 MMHG | SYSTOLIC BLOOD PRESSURE: 116 MMHG

## 2024-03-14 DIAGNOSIS — D12.2 BENIGN NEOPLASM OF ASCENDING COLON: ICD-10-CM

## 2024-03-14 DIAGNOSIS — K62.1 RECTAL POLYP: ICD-10-CM

## 2024-03-14 DIAGNOSIS — D12.3 BENIGN NEOPLASM OF TRANSVERSE COLON: ICD-10-CM

## 2024-03-14 DIAGNOSIS — Z86.010 PERSONAL HISTORY OF COLONIC POLYPS: ICD-10-CM

## 2024-03-14 DIAGNOSIS — K63.3 ULCER OF INTESTINE: ICD-10-CM

## 2024-03-14 DIAGNOSIS — K52.9 NONINFECTIVE GASTROENTERITIS AND COLITIS, UNSPECIFIED: ICD-10-CM

## 2024-03-14 DIAGNOSIS — K57.30 DIVERTICULOSIS OF LARGE INTESTINE WITHOUT PERFORATION OR ABS: ICD-10-CM

## 2024-03-14 DIAGNOSIS — K64.1 SECOND DEGREE HEMORRHOIDS: ICD-10-CM

## 2024-03-14 DIAGNOSIS — Z09 ENCOUNTER FOR FOLLOW-UP EXAMINATION AFTER COMPLETED TREATMEN: ICD-10-CM

## 2024-03-14 PROBLEM — K64.0 FIRST DEGREE HEMORRHOIDS: Status: ACTIVE | Noted: 2024-03-14

## 2024-03-14 PROBLEM — K63.5 POLYP OF COLON: Status: ACTIVE | Noted: 2024-03-14

## 2024-03-14 PROBLEM — K63.89 OTHER SPECIFIED DISEASES OF INTESTINE: Status: ACTIVE | Noted: 2024-03-14

## 2024-03-14 LAB
GI HISTOLOGY: A. TERMINAL ILEUM: (no result)
GI HISTOLOGY: B. ASCENDING COLON: (no result)
GI HISTOLOGY: C. TRANSVERSE COLON: (no result)
GI HISTOLOGY: D. RECTUM: (no result)
GI HISTOLOGY: PDF REPORT: (no result)

## 2024-03-14 PROCEDURE — 45380 COLONOSCOPY AND BIOPSY: CPT | Mod: 59,33 | Performed by: INTERNAL MEDICINE

## 2024-03-14 PROCEDURE — 45385 COLONOSCOPY W/LESION REMOVAL: CPT | Mod: 33 | Performed by: INTERNAL MEDICINE

## 2024-03-14 PROCEDURE — 45380 COLONOSCOPY AND BIOPSY: CPT | Mod: 33,59 | Performed by: INTERNAL MEDICINE

## 2024-03-14 PROCEDURE — 88305 TISSUE EXAM BY PATHOLOGIST: CPT | Performed by: PATHOLOGY

## 2024-03-14 NOTE — TELEPHONE ENCOUNTER
Rx Refill Note  Requested Prescriptions     Pending Prescriptions Disp Refills    metoprolol tartrate (LOPRESSOR) 25 MG tablet [Pharmacy Med Name: METOPROLOL TARTRATE 25 MG TAB] 20 tablet 1     Sig: TAKE 1 TABLET BY MOUTH AS NEEDED (PALPITATIONS).      Last office visit with prescribing clinician: 9/14/2023   Last telemedicine visit with prescribing clinician: Visit date not found   Next office visit with prescribing clinician: 9/9/2024                         Would you like a call back once the refill request has been completed: [] Yes [] No    If the office needs to give you a call back, can they leave a voicemail: [] Yes [] No    Greer Crockett MA  03/14/24, 10:39 EDT

## 2024-06-14 ENCOUNTER — OFFICE (OUTPATIENT)
Dept: URBAN - METROPOLITAN AREA CLINIC 64 | Facility: CLINIC | Age: 64
End: 2024-06-14

## 2024-06-14 VITALS
SYSTOLIC BLOOD PRESSURE: 111 MMHG | WEIGHT: 142 LBS | HEIGHT: 68 IN | HEART RATE: 73 BPM | DIASTOLIC BLOOD PRESSURE: 70 MMHG

## 2024-06-14 DIAGNOSIS — R14.0 ABDOMINAL DISTENSION (GASEOUS): ICD-10-CM

## 2024-06-14 DIAGNOSIS — K21.9 GASTRO-ESOPHAGEAL REFLUX DISEASE WITHOUT ESOPHAGITIS: ICD-10-CM

## 2024-06-14 DIAGNOSIS — Z86.010 PERSONAL HISTORY OF COLONIC POLYPS: ICD-10-CM

## 2024-06-14 DIAGNOSIS — K59.00 CONSTIPATION, UNSPECIFIED: ICD-10-CM

## 2024-06-14 PROCEDURE — 99213 OFFICE O/P EST LOW 20 MIN: CPT | Performed by: NURSE PRACTITIONER

## 2024-09-09 ENCOUNTER — OFFICE VISIT (OUTPATIENT)
Dept: CARDIOLOGY | Facility: CLINIC | Age: 64
End: 2024-09-09
Payer: COMMERCIAL

## 2024-09-09 VITALS
DIASTOLIC BLOOD PRESSURE: 86 MMHG | WEIGHT: 140 LBS | HEIGHT: 68 IN | HEART RATE: 80 BPM | SYSTOLIC BLOOD PRESSURE: 128 MMHG | OXYGEN SATURATION: 100 % | BODY MASS INDEX: 21.22 KG/M2

## 2024-09-09 DIAGNOSIS — I25.10 CORONARY ARTERY CALCIFICATION SEEN ON CT SCAN: ICD-10-CM

## 2024-09-09 DIAGNOSIS — E78.5 DYSLIPIDEMIA: ICD-10-CM

## 2024-09-09 DIAGNOSIS — R07.2 PRECORDIAL PAIN: Primary | ICD-10-CM

## 2024-09-09 PROCEDURE — 93000 ELECTROCARDIOGRAM COMPLETE: CPT | Performed by: INTERNAL MEDICINE

## 2024-09-09 PROCEDURE — 99214 OFFICE O/P EST MOD 30 MIN: CPT | Performed by: INTERNAL MEDICINE

## 2024-09-09 RX ORDER — ASPIRIN 81 MG/1
81 TABLET ORAL DAILY
Qty: 90 TABLET | Refills: 3 | Status: SHIPPED | OUTPATIENT
Start: 2024-09-09

## 2024-09-09 RX ORDER — LANSOPRAZOLE 30 MG/1
1 CAPSULE, DELAYED RELEASE ORAL DAILY
COMMUNITY
Start: 2024-08-03

## 2024-09-09 NOTE — PROGRESS NOTES
Subjective:     Encounter Date:09/09/2024      Patient ID: Keila Caldwell is a 64 y.o. female.    Chief Complaint and history of present illness:     Follow-up for CAD by CT calcium score, dyslipidemia, hyperglycemia     History of Present Illness          Ms. Keila Caldwell has PMH of     CAD by CT calcium score of 14(11 LAD and 3 RCA)  History of palpitations  Hyperlipidemia  Hyperhomocysteinemia-18.2 on 4/4/2028  Hyperglycemia  Hypothyroidism, thyroidectomy  Migraine headaches  GERD  Allergies/intolerance to NSAIDs  GERMAIN and foot surgery  Non-smoker, family history negative for premature CAD.     Here for follow-up.  Patient is complaining of intermittent chest pain with no aggravating or relieving factors.  Patient exercises on a regular basis and does not have chest pain with exercise.  Underwent treadmill which was negative, CT calcium score was 14.  [place] had an episode palpitations where her heart was racing and she felt weak in her legs.  Went to urgent care center they sent her to ER..  EKG 9/2/2023 reviewed/intubated by me reveals sinus tach at the rate of 102 bpm chest x-ray reveals no cardiopulmonary abnormality.  Labs reveal normal CBC CMP with a creatinine of 1.16 and EGFR 52 D-dimer negative at 0.36 HS troponin elevated at 14.6.  Repeat was 13.3.        Patient's arterial blood pressure is 128/86, heart rate 80, O2 sat of 100% on room air.     Review of records reveal labs from 4/16/2022 reveal normal troponin CBC, BMP with a glucose of 106 and calcium of 7.8.  Labs from 4/8/2022 reveal TSH of 0.500, normal CMP, cholesterol 256, triglycerides 90, HDL 71, .     Patient had CT calcium score 6/30/2022 which was low at 14.  With 11 and LAD and 3 and RCA.     Patient had treadmill stress test 7/7/2022 where she walked 6.13 minutes achieved 100% heart rate without EKG changes or chest pain.    Labs:  8/2/2024 lipid profile with cholesterol 170, triglycerides 95,  HDL 58, LDL 93.  Normal CMP.  TSH low at 0.150        Assessment:  :        Chest pain  Hyperlipidemia  Hypothyroidism  Hyperglycemia        Recommendations / Plan:            Reviewed EKG results with patient.  Will schedule stress test to evaluate chest pain.  Patient could have atypical presentation due to female gender.  Patient was told not to take NSAIDs when she developed ALEAH with NSAIDs Aleve in the past.  She is not allergic to aspirin.  Will start her on baby aspirin.  Continue aspirin, atorvastatin, metoprolol as tolerated  Follow-up with PMD for labs.  Follow-up with PMD for hyperglycemia and hypocalcemia        Procedures CT CT calcium score and treadmill results.            ECG 12 Lead    Date/Time: 9/9/2024 1:05 PM  Performed by: Garth Martines MD    Authorized by: Garth Martines MD  Comparison: compared with previous ECG from 9/2/2023  Comparison to previous ECG: EKG done today reviewed/interpreted by me reveals sinus rhythm with a rate of 77 bpm.  Compared to EKG from 9/2/2023 heart rate has improved.          Copied text in this portion of the note has been reviewed and is accurate as of 9/9/2024  The following portions of the patient's history were reviewed and updated as appropriate: allergies, current medications, past family history, past medical history, past social history, past surgical history and problem list.    Assessment:         MDM       Diagnosis Plan   1. Precordial pain  ECG 12 Lead    Treadmill Stress Test      2. Coronary artery calcification seen on CT scan  ECG 12 Lead    Treadmill Stress Test      3. Dyslipidemia  ECG 12 Lead    Treadmill Stress Test             Plan:               Past Medical History:  Past Medical History:   Diagnosis Date    Arthritis     Chronic nausea     GERD (gastroesophageal reflux disease)     History of UTI     Hyperlipidemia 6 month ago    Hypothyroidism     Migraines     ON MED    PONV (postoperative nausea and vomiting)       Past Surgical History:  Past Surgical History:   Procedure Laterality Date    ABLATION OF DYSRHYTHMIC FOCUS  8/23 & 3/24    neck C1-C7    COLONOSCOPY  approx 2008    normal per pt     COLONOSCOPY W/ POLYPECTOMY N/A 03/07/2019    One 3 mm polyp in the transverse colon. Path:Mild chronic inflammation.     ENDOSCOPY N/A 03/07/2019    LA Grade A esophagitis    ENDOSCOPY N/A 04/14/2021    Procedure: ESOPHAGOGASTRODUODENOSCOPY WITH BX;  Surgeon: Aravind Bowers MD;  Location: [facility] ENDOSCOPY;  Service: Gastroenterology;  Laterality: N/A;  RUQ PAIN   --IRREGULAR Z-LINE     FOOT SURGERY Right     CYST    HYSTERECTOMY      TONSILLECTOMY      TOTAL HIP ARTHROPLASTY Left 03/16/2018    Procedure: LT TOTAL HIP ARTHROPLASTY ANTERIOR WITH HANA TABLE;  Surgeon: Sergio Balderas MD;  Location: [facility] MAIN OR;  Service: Orthopedics      Allergies:  Allergies   Allergen Reactions    Aspirin Unknown - High Severity    Nsaids Other (See Comments)     KIDNEY DYSFUNCTION     Home Meds:  Current Meds:     Current Outpatient Medications:     acetaminophen (TYLENOL) 500 MG tablet, Take 1 tablet by mouth Every 6 (Six) Hours As Needed for Mild Pain. Takes 605mg daily, Disp: , Rfl:     atorvastatin (LIPITOR) 10 MG tablet, TAKE 1 TABLET BY MOUTH EVERY DAY, Disp: 90 tablet, Rfl: 0    calcitriol (ROCALTROL) 0.5 MCG capsule, Take 1 capsule by mouth 2 (Two) Times a Day., Disp: , Rfl:     CALCIUM PO, Take 1,000 mg by mouth 4 (Four) Times a Day. CHEWABLE TABLETS, Disp: , Rfl:     CBD (cannabidiol) oral oil, Take  by mouth. 3000 mg at night, Disp: , Rfl:     cetirizine (zyrTEC) 10 MG tablet, Take 1 tablet by mouth As Needed for Allergies., Disp: , Rfl:     cyclobenzaprine (FLEXERIL) 10 MG tablet, Take 1 tablet by mouth 3 (Three) Times a Day As Needed for Muscle Spasms., Disp: 21 tablet, Rfl: 0    estradiol (ESTRACE) 0.1 MG/GM vaginal cream, Insert 1 g into the vagina 2 (Two) Times a Week., Disp: , Rfl: 6    lansoprazole (PREVACID) 30  MG capsule, Take 1 capsule by mouth Daily., Disp: , Rfl:     levothyroxine (SYNTHROID, LEVOTHROID) 88 MCG tablet, Take 1 tablet by mouth Daily., Disp: , Rfl:     liothyronine (CYTOMEL) 5 MCG tablet, Take 1 tablet by mouth Daily., Disp: , Rfl:     Magnesium 500 MG capsule, Take 1 capsule by mouth Daily., Disp: , Rfl:     metoprolol tartrate (LOPRESSOR) 25 MG tablet, Take 1 tablet by mouth As Needed (Palpitations)., Disp: 90 tablet, Rfl: 1    naratriptan (AMERGE) 2.5 MG tablet, Take 1 tablet by mouth., Disp: , Rfl:     onabotulinumtoxina (Botox) 100 units reconstituted solution injection, 1 (One) Time., Disp: , Rfl:     vitamin D (ERGOCALCIFEROL) 41511 units capsule capsule, Take 1 capsule by mouth Every 30 (Thirty) Days., Disp: , Rfl: 1    aspirin 81 MG EC tablet, Take 1 tablet by mouth Daily., Disp: 90 tablet, Rfl: 3  Social History:   Social History     Tobacco Use    Smoking status: Never    Smokeless tobacco: Never   Substance Use Topics    Alcohol use: Yes     Alcohol/week: 1.0 standard drink of alcohol     Types: 1 Glasses of wine per week     Comment: once a month or so      Family History:  Family History   Problem Relation Age of Onset    Hyperlipidemia Mother     Stomach cancer Father     Colon polyps Father     No Known Problems Sister     No Known Problems Brother     No Known Problems Son     No Known Problems Daughter     No Known Problems Maternal Grandmother     No Known Problems Maternal Grandfather     No Known Problems Paternal Grandmother     No Known Problems Paternal Grandfather     No Known Problems Cousin     Malig Hyperthermia Neg Hx               Review of Systems   Constitutional: Negative for malaise/fatigue.   Cardiovascular:  Positive for chest pain. Negative for leg swelling and palpitations.   Respiratory:  Negative for shortness of breath.    Skin:  Negative for rash.   Neurological:  Negative for dizziness, light-headedness and numbness.     All other systems are negative        "  Objective:     Physical Exam  /86   Pulse 80   Ht 172.7 cm (68\")   Wt 63.5 kg (140 lb)   SpO2 100%   BMI 21.29 kg/m²   General:  Appears in no acute distress  Eyes: Sclera is anicteric,  conjunctiva is clear   HEENT:  No JVD.  No carotid bruits  Respiratory: Respirations regular and unlabored at rest.  Clear to auscultation  Cardiovascular: S1,S2 Regular rate and rhythm. No murmur, rub or gallop auscultated.   Extremities: No digital clubbing or cyanosis, no edema  Skin: Color pink. Skin warm and dry to touch. No rashes  No xanthoma  Neuro: Alert and awake.    Lab Reviewed:         Garth Martines MD  9/9/2024 13:18 EDT      EMR Dragon/Transcription:   \"Dictated utilizing Dragon dictation\".        "

## 2024-09-10 DIAGNOSIS — E20.9 HYPOPARATHYROIDISM, UNSPECIFIED HYPOPARATHYROIDISM TYPE: ICD-10-CM

## 2024-09-10 DIAGNOSIS — Z00.00 ANNUAL PHYSICAL EXAM: Primary | ICD-10-CM

## 2024-09-11 LAB
ALBUMIN SERPL-MCNC: 4.5 G/DL (ref 3.5–5.2)
ALBUMIN/GLOB SERPL: 2.1 G/DL
ALP SERPL-CCNC: 75 U/L (ref 39–117)
ALT SERPL-CCNC: 12 U/L (ref 1–33)
APPEARANCE UR: CLEAR
AST SERPL-CCNC: 22 U/L (ref 1–32)
BACTERIA #/AREA URNS HPF: ABNORMAL /HPF
BASOPHILS # BLD AUTO: 0.05 10*3/MM3 (ref 0–0.2)
BASOPHILS NFR BLD AUTO: 0.9 % (ref 0–1.5)
BILIRUB SERPL-MCNC: 0.5 MG/DL (ref 0–1.2)
BILIRUB UR QL STRIP: NEGATIVE
BUN SERPL-MCNC: 22 MG/DL (ref 8–23)
BUN/CREAT SERPL: 21 (ref 7–25)
CALCIUM SERPL-MCNC: 8.7 MG/DL (ref 8.6–10.5)
CASTS URNS MICRO: ABNORMAL
CHLORIDE SERPL-SCNC: 99 MMOL/L (ref 98–107)
CHOLEST SERPL-MCNC: 188 MG/DL (ref 0–200)
CO2 SERPL-SCNC: 28.4 MMOL/L (ref 22–29)
COLOR UR: YELLOW
CREAT SERPL-MCNC: 1.05 MG/DL (ref 0.57–1)
EGFRCR SERPLBLD CKD-EPI 2021: 60.2 ML/MIN/1.73
EOSINOPHIL # BLD AUTO: 0.08 10*3/MM3 (ref 0–0.4)
EOSINOPHIL NFR BLD AUTO: 1.4 % (ref 0.3–6.2)
EPI CELLS #/AREA URNS HPF: ABNORMAL /HPF
ERYTHROCYTE [DISTWIDTH] IN BLOOD BY AUTOMATED COUNT: 12.8 % (ref 12.3–15.4)
GLOBULIN SER CALC-MCNC: 2.1 GM/DL
GLUCOSE SERPL-MCNC: 85 MG/DL (ref 65–99)
GLUCOSE UR QL STRIP: NEGATIVE
HCT VFR BLD AUTO: 43.9 % (ref 34–46.6)
HDLC SERPL-MCNC: 70 MG/DL (ref 40–60)
HGB BLD-MCNC: 14.5 G/DL (ref 12–15.9)
HGB UR QL STRIP: NEGATIVE
IMM GRANULOCYTES # BLD AUTO: 0.02 10*3/MM3 (ref 0–0.05)
IMM GRANULOCYTES NFR BLD AUTO: 0.4 % (ref 0–0.5)
KETONES UR QL STRIP: NEGATIVE
LDLC SERPL CALC-MCNC: 101 MG/DL (ref 0–100)
LDLC/HDLC SERPL: 1.42 {RATIO}
LEUKOCYTE ESTERASE UR QL STRIP: ABNORMAL
LYMPHOCYTES # BLD AUTO: 1.75 10*3/MM3 (ref 0.7–3.1)
LYMPHOCYTES NFR BLD AUTO: 31.1 % (ref 19.6–45.3)
MCH RBC QN AUTO: 30.1 PG (ref 26.6–33)
MCHC RBC AUTO-ENTMCNC: 33 G/DL (ref 31.5–35.7)
MCV RBC AUTO: 91.1 FL (ref 79–97)
MONOCYTES # BLD AUTO: 0.5 10*3/MM3 (ref 0.1–0.9)
MONOCYTES NFR BLD AUTO: 8.9 % (ref 5–12)
NEUTROPHILS # BLD AUTO: 3.22 10*3/MM3 (ref 1.7–7)
NEUTROPHILS NFR BLD AUTO: 57.3 % (ref 42.7–76)
NITRITE UR QL STRIP: NEGATIVE
NRBC BLD AUTO-RTO: 0 /100 WBC (ref 0–0.2)
PH UR STRIP: 7 [PH] (ref 5–8)
PLATELET # BLD AUTO: 291 10*3/MM3 (ref 140–450)
POTASSIUM SERPL-SCNC: 5 MMOL/L (ref 3.5–5.2)
PROT SERPL-MCNC: 6.6 G/DL (ref 6–8.5)
PROT UR QL STRIP: NEGATIVE
RBC # BLD AUTO: 4.82 10*6/MM3 (ref 3.77–5.28)
RBC #/AREA URNS HPF: ABNORMAL /HPF
SODIUM SERPL-SCNC: 137 MMOL/L (ref 136–145)
SP GR UR STRIP: ABNORMAL (ref 1–1.03)
T4 FREE SERPL-MCNC: 1.7 NG/DL (ref 0.93–1.7)
TRIGL SERPL-MCNC: 94 MG/DL (ref 0–150)
TSH SERPL DL<=0.005 MIU/L-ACNC: 0.29 UIU/ML (ref 0.27–4.2)
UROBILINOGEN UR STRIP-MCNC: ABNORMAL MG/DL
VLDLC SERPL CALC-MCNC: 17 MG/DL (ref 5–40)
WBC # BLD AUTO: 5.62 10*3/MM3 (ref 3.4–10.8)
WBC #/AREA URNS HPF: ABNORMAL /HPF

## 2024-09-16 ENCOUNTER — OFFICE VISIT (OUTPATIENT)
Dept: FAMILY MEDICINE CLINIC | Facility: CLINIC | Age: 64
End: 2024-09-16
Payer: COMMERCIAL

## 2024-09-16 VITALS
WEIGHT: 139.8 LBS | RESPIRATION RATE: 18 BRPM | HEART RATE: 78 BPM | SYSTOLIC BLOOD PRESSURE: 116 MMHG | HEIGHT: 68 IN | TEMPERATURE: 98.5 F | OXYGEN SATURATION: 99 % | DIASTOLIC BLOOD PRESSURE: 72 MMHG | BODY MASS INDEX: 21.19 KG/M2

## 2024-09-16 DIAGNOSIS — Z23 NEED FOR INFLUENZA VACCINATION: ICD-10-CM

## 2024-09-16 DIAGNOSIS — E78.01 FAMILIAL HYPERCHOLESTEROLEMIA: ICD-10-CM

## 2024-09-16 DIAGNOSIS — Z00.00 ANNUAL PHYSICAL EXAM: Primary | ICD-10-CM

## 2024-09-16 PROBLEM — M17.12 OSTEOARTHRITIS OF LEFT KNEE: Status: ACTIVE | Noted: 2022-09-06

## 2024-09-16 PROBLEM — M17.11 OSTEOARTHRITIS OF RIGHT KNEE: Status: ACTIVE | Noted: 2022-08-31

## 2024-09-16 PROBLEM — R00.8 OTHER ABNORMALITIES OF HEART BEAT: Status: ACTIVE | Noted: 2024-09-16

## 2024-09-16 PROBLEM — K57.30 DIVERTICULOSIS OF LARGE INTESTINE WITHOUT PERFORATION OR ABSCESS WITHOUT BLEEDING: Status: ACTIVE | Noted: 2024-03-14

## 2024-09-16 PROBLEM — G43.909 MIGRAINE: Status: ACTIVE | Noted: 2024-09-16

## 2024-09-16 PROBLEM — G89.29 CHRONIC NECK PAIN: Status: ACTIVE | Noted: 2023-04-18

## 2024-09-16 PROBLEM — N18.2 CHRONIC KIDNEY DISEASE, STAGE II (MILD): Status: ACTIVE | Noted: 2022-02-10

## 2024-09-16 PROBLEM — M18.9 OSTEOARTHRITIS OF CARPOMETACARPAL (CMC) JOINT OF THUMB: Status: ACTIVE | Noted: 2023-07-20

## 2024-09-16 PROBLEM — M54.2 CHRONIC NECK PAIN: Status: ACTIVE | Noted: 2023-04-18

## 2024-09-16 PROBLEM — K21.00 GASTRO-ESOPHAGEAL REFLUX DISEASE WITH ESOPHAGITIS: Status: ACTIVE | Noted: 2024-09-16

## 2024-09-16 PROCEDURE — 90656 IIV3 VACC NO PRSV 0.5 ML IM: CPT | Performed by: NURSE PRACTITIONER

## 2024-09-16 PROCEDURE — 90471 IMMUNIZATION ADMIN: CPT | Performed by: NURSE PRACTITIONER

## 2024-09-16 PROCEDURE — 99396 PREV VISIT EST AGE 40-64: CPT | Performed by: NURSE PRACTITIONER

## 2024-09-16 PROCEDURE — 99213 OFFICE O/P EST LOW 20 MIN: CPT | Performed by: NURSE PRACTITIONER

## 2024-09-16 RX ORDER — ATORVASTATIN CALCIUM 20 MG/1
20 TABLET, FILM COATED ORAL DAILY
Qty: 90 TABLET | Refills: 1 | Status: SHIPPED | OUTPATIENT
Start: 2024-09-16

## 2024-10-02 ENCOUNTER — HOSPITAL ENCOUNTER (OUTPATIENT)
Dept: CARDIOLOGY | Facility: HOSPITAL | Age: 64
Discharge: HOME | End: 2024-10-02
Payer: COMMERCIAL

## 2024-10-02 DIAGNOSIS — R07.2 PRECORDIAL PAIN: ICD-10-CM

## 2024-10-02 DIAGNOSIS — I25.10 CORONARY ARTERY CALCIFICATION SEEN ON CT SCAN: ICD-10-CM

## 2024-10-02 DIAGNOSIS — E78.5 DYSLIPIDEMIA: ICD-10-CM

## 2024-10-02 PROCEDURE — 93017 CV STRESS TEST TRACING ONLY: CPT

## 2024-10-03 ENCOUNTER — TELEPHONE (OUTPATIENT)
Dept: CARDIOLOGY | Facility: CLINIC | Age: 64
End: 2024-10-03
Payer: COMMERCIAL

## 2024-10-03 LAB
BH CV STRESS BP STAGE 1: NORMAL
BH CV STRESS BP STAGE 2: NORMAL
BH CV STRESS DURATION MIN STAGE 1: 3
BH CV STRESS DURATION MIN STAGE 2: 3
BH CV STRESS DURATION MIN STAGE 3: 3
BH CV STRESS DURATION SEC STAGE 1: 0
BH CV STRESS DURATION SEC STAGE 2: 0
BH CV STRESS DURATION SEC STAGE 3: 0
BH CV STRESS GRADE STAGE 1: 10
BH CV STRESS GRADE STAGE 2: 12
BH CV STRESS GRADE STAGE 3: 14
BH CV STRESS HR STAGE 1: 139
BH CV STRESS HR STAGE 2: 160
BH CV STRESS HR STAGE 3: 162
BH CV STRESS METS STAGE 1: 5
BH CV STRESS METS STAGE 2: 7.5
BH CV STRESS METS STAGE 3: 10
BH CV STRESS PROTOCOL 1: NORMAL
BH CV STRESS RECOVERY BP: NORMAL MMHG
BH CV STRESS RECOVERY HR: 118 BPM
BH CV STRESS SPEED STAGE 1: 1.7
BH CV STRESS SPEED STAGE 2: 2.5
BH CV STRESS SPEED STAGE 3: 3.4
BH CV STRESS STAGE 1: 1
BH CV STRESS STAGE 2: 2
BH CV STRESS STAGE 3: 3
MAXIMAL PREDICTED HEART RATE: 158 BPM
STRESS BASELINE BP: NORMAL MMHG
STRESS BASELINE HR: 101 BPM
STRESS POST EXERCISE DUR MIN: 6 MIN
STRESS POST EXERCISE DUR SEC: 28 SEC
STRESS TARGET HR: 134 BPM

## 2024-12-26 ENCOUNTER — APPOINTMENT (OUTPATIENT)
Dept: WOMENS IMAGING | Facility: HOSPITAL | Age: 64
End: 2024-12-26
Payer: COMMERCIAL

## 2024-12-26 PROCEDURE — 77067 SCR MAMMO BI INCL CAD: CPT | Performed by: RADIOLOGY

## 2024-12-26 PROCEDURE — 77063 BREAST TOMOSYNTHESIS BI: CPT | Performed by: RADIOLOGY

## 2025-02-20 DIAGNOSIS — E78.01 FAMILIAL HYPERCHOLESTEROLEMIA: ICD-10-CM

## 2025-02-20 RX ORDER — ATORVASTATIN CALCIUM 20 MG/1
20 TABLET, FILM COATED ORAL DAILY
Qty: 90 TABLET | Refills: 1 | Status: SHIPPED | OUTPATIENT
Start: 2025-02-20

## 2025-05-05 RX ORDER — METOPROLOL TARTRATE 25 MG/1
TABLET, FILM COATED ORAL
Qty: 90 TABLET | Refills: 0 | Status: SHIPPED | OUTPATIENT
Start: 2025-05-05

## 2025-05-05 NOTE — TELEPHONE ENCOUNTER
Rx Refill Note  Requested Prescriptions     Pending Prescriptions Disp Refills    metoprolol tartrate (LOPRESSOR) 25 MG tablet [Pharmacy Med Name: METOPROL TAR TAB 25MG] 90 tablet 0     Sig: TAKE 1 TABLET AS NEEDED    (PALPITATIONS)      Last office visit with prescribing clinician: 9/9/2024     Next office visit with prescribing clinician: 9/9/2025                             Stephanie Pond MA  05/05/25, 12:49 EDT

## 2025-07-02 ENCOUNTER — TELEPHONE (OUTPATIENT)
Age: 65
End: 2025-07-02

## 2025-07-02 ENCOUNTER — TELEPHONE (OUTPATIENT)
Dept: CARDIOLOGY | Facility: CLINIC | Age: 65
End: 2025-07-02
Payer: COMMERCIAL

## 2025-07-02 NOTE — TELEPHONE ENCOUNTER
SPOKE TO PATIENT. SHE WANTS TO SEE DR. PEDRO PABLO MATHEW AT [facility] [place].

## 2025-07-02 NOTE — TELEPHONE ENCOUNTER
Caller: Keila Caldwell    Relationship: Self    Best call back number: [phone]     Who is your current provider: DR CARDONA    Is your current provider offboarding? NO    Who would you like your new provider to be: DR DALE PREFERABLE, DR MATHEW IF UNABLE    What are your reasons for transferring care: PREFERENCE, SECOND OPINION    Additional notes: PT STATES SHE IS WANTING TO TRANSITION CARE TO [facility] [place] - PT STATES HER  SEES DR DALE AND WOULD PREFER TO SEE HER IF POSSIBLE BUT WHEN ADVISED ON NOT ACCEPTING NEW PATIENTS, SHE IS REQUESTING A COMPARABLE PROVIDER, HUB SUGGESTED DR MATHEW AND PT AGREEABLE, SHE HAS H[place] WITH DR MATHEW AND [place]ARDONA [place]     PT WANTS TO SCHEDULE A TRANSFER OF CARE APPT FOR SECOND OPINION ON HER HEART CONDITION AND MEDICATION MANAGEMENT - PT HAS BEEN ON METOPROLOL FOR 1.5YEARS AND WOULD LIKE TO HAVE HER CARE BE WITHIN THE SAME OFFICE AS HER  - SHE IS WANTING A DEEPER UNDERSTANDING OF WHAT IS HAPPENING WITH HER HEART AND WHAT IS NEEDED -

## 2025-07-02 NOTE — TELEPHONE ENCOUNTER
Caller: Keila Caldwell    Relationship: Self    Best call back number: [phone]     Who is your current provider: DR CARDONA    Is your current provider offboarding? NO    Who would you like your new provider to be: DR DALE PREFERABLE, DR MATHEW IF UNABLE    What are your reasons for transferring care: PREFERENCE, SECOND OPINION    Additional notes: PT STATES SHE IS WANTING TO TRANSITION CARE TO [facility] - PT STATES HER  SEES DR DALE AND WOULD PREFER TO SEE HER IF POSSIBLE BUT WHEN ADVISED ON NOT ACCEPTING NEW PATIENTS, SHE IS REQUESTING A COMPARABLE PROVIDER, HUB SUGGESTED DR MATHEW AND PT AGREEABLE, SHE HAS H[place] WITH DR MATHEW AND [place] CARDONA [place]     PT WANTS TO SCHEDULE A TRANSFER OF CARE APPT FOR SECOND OPINION ON HER HEART CONDITION AND MEDICATION MANAGEMENT - PT HAS BEEN ON METOPROLOL FOR 1.5YEARS AND WOULD LIKE TO HAVE HER CARE BE WITHIN THE SAME OFFICE AS HER  - SHE IS WANTING A DEEPER UNDERSTANDING OF WHAT IS HAPPENING WITH HER HEART AND WHAT IS NEEDED -

## 2025-07-07 NOTE — TELEPHONE ENCOUNTER
Caller: Keila Caldwell    Relationship: Self    Best call back number: [phone]      What was the call regarding: PATIENT CALLED BACK AND STATED THAT SHE IS WANTING TO GET AN UPDATE ON THE LUCIANA REQUEST. PATIENT GOT CALL FROM DIANNE AND WANTS TO KNOW IF DR MATHEW WILL TAKE THE PATIENT PLEASE CALL AND ADVISE,

## 2025-07-08 NOTE — TELEPHONE ENCOUNTER
Caller: Keila Caldwell    Relationship: Self    Best call back number: [phone]    What is the best time to reach you: ANYTIME    What was the call regarding: PT IS CALLING TO SCHEDULE AN APPT WITH DR. MATHEW OR HER APRN AND WANTED TO SEE IF DR. MATHEW WOULD SEE HER AS A PT.

## 2025-07-10 NOTE — TELEPHONE ENCOUNTER
Caller: Keila Caldwell    Relationship to patient: Self    Best call back number: [phone]    Patient is needing: PATIENT CALLING TO CHECK ON THIS. ITS BEEN OVER A WEEK, PLEASE CALL TO ADVISE.

## 2025-07-15 ENCOUNTER — TELEPHONE (OUTPATIENT)
Dept: FAMILY MEDICINE CLINIC | Facility: CLINIC | Age: 65
End: 2025-07-15

## 2025-07-22 ENCOUNTER — TELEPHONE (OUTPATIENT)
Dept: FAMILY MEDICINE CLINIC | Facility: CLINIC | Age: 65
End: 2025-07-22
Payer: COMMERCIAL

## 2025-07-22 NOTE — TELEPHONE ENCOUNTER
Name: Keila Caldwell      Relationship: Self      Best Callback Number: [phone]*      HUB PROVIDED THE RELAY MESSAGE FROM THE OFFICE      PATIENT: HAS FURTHER QUESTIONS AND WOULD LIKE A CALL BACK AT THE FOLLOWING PHONE [phone]    ADDITIONAL INFORMATION: CALL BACK PATIENT ANYTIME TODAY AFTER 12PM. THE PATIENT REQUEST A CALL BACK TO RESCHEDULE PHYSICAL WITH DR. BURLESON.

## 2025-08-06 RX ORDER — METOPROLOL TARTRATE 25 MG/1
TABLET, FILM COATED ORAL
Qty: 90 TABLET | Refills: 0 | Status: SHIPPED | OUTPATIENT
Start: 2025-08-06

## 2025-08-15 ENCOUNTER — OFFICE VISIT (OUTPATIENT)
Age: 65
End: 2025-08-15
Payer: COMMERCIAL

## 2025-08-15 VITALS
OXYGEN SATURATION: 99 % | DIASTOLIC BLOOD PRESSURE: 78 MMHG | BODY MASS INDEX: 22.05 KG/M2 | HEIGHT: 67 IN | WEIGHT: 140.5 LBS | SYSTOLIC BLOOD PRESSURE: 122 MMHG | HEART RATE: 82 BPM

## 2025-08-15 DIAGNOSIS — E78.01 FAMILIAL HYPERCHOLESTEROLEMIA: ICD-10-CM

## 2025-08-15 DIAGNOSIS — R00.2 PALPITATIONS: Primary | ICD-10-CM

## 2025-08-15 RX ORDER — DIAZEPAM 10 MG/1
TABLET ORAL
COMMUNITY

## 2025-08-15 RX ORDER — METOPROLOL TARTRATE 25 MG/1
25 TABLET, FILM COATED ORAL EVERY EVENING
Start: 2025-08-15 | End: 2025-08-15

## 2025-08-19 DIAGNOSIS — E78.01 FAMILIAL HYPERCHOLESTEROLEMIA: ICD-10-CM

## 2025-08-20 DIAGNOSIS — E78.01 FAMILIAL HYPERCHOLESTEROLEMIA: ICD-10-CM

## 2025-08-20 RX ORDER — ATORVASTATIN CALCIUM 20 MG/1
20 TABLET, FILM COATED ORAL DAILY
Qty: 90 TABLET | Refills: 0 | Status: SHIPPED | OUTPATIENT
Start: 2025-08-20

## 2025-08-20 RX ORDER — ATORVASTATIN CALCIUM 20 MG/1
20 TABLET, FILM COATED ORAL DAILY
Qty: 90 TABLET | Refills: 1 | OUTPATIENT
Start: 2025-08-20

## (undated) DEVICE — THE SINGLE USE ETRAP – POLYP TRAP IS USED FOR SUCTION RETRIEVAL OF ENDOSCOPICALLY REMOVED POLYPS.: Brand: ETRAP

## (undated) DEVICE — FRCP BX RADJAW4 NDL 2.8 240CM LG OG BX40

## (undated) DEVICE — BITEBLOCK OMNI BLOC

## (undated) DEVICE — PK ANT HIP 40

## (undated) DEVICE — ANTIBACTERIAL UNDYED BRAIDED (POLYGLACTIN 910), SYNTHETIC ABSORBABLE SUTURE: Brand: COATED VICRYL

## (undated) DEVICE — BIPOLAR SEALER 23-112-1 AQM 6.0: Brand: AQUAMANTYS™

## (undated) DEVICE — APPL CHLORAPREP W/TINT 26ML ORNG

## (undated) DEVICE — SHEET, DRAPE, SPLIT, STERILE: Brand: MEDLINE

## (undated) DEVICE — 1010 S-DRAPE TOWEL DRAPE 10/BX: Brand: STERI-DRAPE™

## (undated) DEVICE — SENSR O2 OXIMAX FNGR A/ 18IN NONSTR

## (undated) DEVICE — SKIN PREP TRAY W/CHG: Brand: MEDLINE INDUSTRIES, INC.

## (undated) DEVICE — DRSNG WND GEL FIBR OPTICELL AG PLS W/SLV LF 4X5IN  STRL

## (undated) DEVICE — MSK ENDO PORT O2 POM ELITE CURAPLEX A/

## (undated) DEVICE — SOL ISO/ALC RUB 70PCT 4OZ

## (undated) DEVICE — 3M™ STERI-STRIP™ REINFORCED ADHESIVE SKIN CLOSURES, R1547, 1/2 IN X 4 IN (12 MM X 100 MM), 6 STRIPS/ENVELOPE: Brand: 3M™ STERI-STRIP™

## (undated) DEVICE — THE TORRENT IRRIGATION SCOPE CONNECTOR IS USED WITH THE TORRENT IRRIGATION TUBING TO PROVIDE IRRIGATION FLUIDS SUCH AS STERILE WATER DURING GASTROINTESTINAL ENDOSCOPIC PROCEDURES WHEN USED IN CONJUNCTION WITH AN IRRIGATION PUMP (OR ELECTROSURGICAL UNIT).: Brand: TORRENT

## (undated) DEVICE — SYR CONTRL LUERLOK 10CC

## (undated) DEVICE — TUBING, SUCTION, 1/4" X 10', STRAIGHT: Brand: MEDLINE

## (undated) DEVICE — Device: Brand: DEFENDO AIR/WATER/SUCTION AND BIOPSY VALVE

## (undated) DEVICE — SUT MNCRYL 3/0 PS2 18IN MCP497G

## (undated) DEVICE — SINGLE-USE POLYPECTOMY SNARE: Brand: CAPTIVATOR II

## (undated) DEVICE — GLV SURG TRIUMPH CLASSIC PF LTX 8 STRL

## (undated) DEVICE — ADAPT CLN BIOGUARD AIR/H2O DISP

## (undated) DEVICE — MAT FLR ABSORBENT LG 4FT 10 2.5FT

## (undated) DEVICE — LN SMPL CO2 SHTRM SD STREAM W/M LUER

## (undated) DEVICE — GLV SURG BIOGEL LTX PF 8

## (undated) DEVICE — SUT VIC 0 CT1 36IN J946H

## (undated) DEVICE — ENCORE® LATEX ORTHO SIZE 8, STERILE LATEX POWDER-FREE SURGICAL GLOVE: Brand: ENCORE

## (undated) DEVICE — KT ORCA ORCAPOD DISP STRL

## (undated) DEVICE — CANN NASL CO2 TRULINK W/O2 A/

## (undated) DEVICE — DRSNG BRDR MEPILEX P/OP SIL 4X8IN